# Patient Record
Sex: MALE | Race: WHITE | Employment: OTHER | URBAN - METROPOLITAN AREA
[De-identification: names, ages, dates, MRNs, and addresses within clinical notes are randomized per-mention and may not be internally consistent; named-entity substitution may affect disease eponyms.]

---

## 2017-01-16 ENCOUNTER — TELEPHONE (OUTPATIENT)
Dept: AUDIOLOGY | Facility: CLINIC | Age: 62
End: 2017-01-16

## 2017-01-16 NOTE — TELEPHONE ENCOUNTER
Reason for Call:  Other     Detailed comments: Per Srinivasan she is to collect some hearing aid batteries today from the clinic to please call to advice.    Phone Number Patient can be reached at: Home number on file [phone] (home)    Best Time: anytime    Can we leave a detailed message on this number? YES    Call taken on 1/16/2017 at 9:43 AM by Karely Michael

## 2017-01-18 DIAGNOSIS — H90.3 SENSORINEURAL HEARING LOSS, ASYMMETRICAL: Primary | ICD-10-CM

## 2017-01-18 PROCEDURE — 99207 ZZC NO CHARGE LOS: CPT | Performed by: AUDIOLOGIST

## 2017-01-18 PROCEDURE — V5266 BATTERY FOR HEARING DEVICE: HCPCS | Performed by: AUDIOLOGIST

## 2017-01-18 NOTE — PROGRESS NOTES
Patient calls to request his 3 month supply of batteries be mailed to his home address. Mailed 24 size 13 batteries to the address on file.     CHARGES:    x24 Batteries ($1.25). Total: $30 Bill to patient's insurance.    Tomasz Marin CCC-A  Licensed Audiologist #[id number]  1/18/2017

## 2017-04-13 ENCOUNTER — OFFICE VISIT (OUTPATIENT)
Dept: AUDIOLOGY | Facility: CLINIC | Age: 62
End: 2017-04-13
Payer: COMMERCIAL

## 2017-04-13 DIAGNOSIS — H90.3 SENSORINEURAL HEARING LOSS, ASYMMETRICAL: Primary | ICD-10-CM

## 2017-04-13 PROCEDURE — V5299 HEARING SERVICE: HCPCS | Performed by: AUDIOLOGIST

## 2017-04-13 NOTE — MR AVS SNAPSHOT
"              After Visit Summary   2017    Paulette Dorsey    MRN: [id number]           Patient Information     Date Of Birth          1955        Visit Information        Provider Department      2017 9:30 AM Tomasz Ahmadi [facility]D [facility]        Today's Diagnoses     Sensorineural hearing loss, asymmetrical    -  1       Follow-ups after your visit        Who to contact     If you have questions or need follow up information about today's clinic visit or your schedule please contact [facility] directly at [phone].  Normal or non-critical lab and imaging results will be communicated to you by AdMobi[employer]art, letter or phone within 4 business days after the clinic has received the results. If you do not hear from us within 7 days, please contact the clinic through [employer]Mobiushart or phone. If you have a critical or abnormal lab result, we will notify you by phone as soon as possible.  Submit refill requests through Fairlay or call your pharmacy and they will forward the refill request to us. Please allow 3 business days for your refill to be completed.          Additional Information About Your Visit        MyChart Information     Fairlay lets you send messages to your doctor, view your test results, renew your prescriptions, schedule appointments and more. To sign up, go to [email or web address] . Click on \"Log in\" on the left side of the screen, which will take you to the Welcome page. Then click on \"Sign up Now\" on the right side of the page.     You will be asked to enter the access code listed below, as well as some personal information. Please follow the directions to create your username and password.     Your access code is: [email or web address]  Expires: 2017  9:50 AM     Your access code will  in 90 days. If you need help or a new code, please call your [facility] or [phone].        Care EveryWhere ID     This is your Care EveryWhere ID. This could be " used by other organizations to access your [place] medical records  [id number]         Blood Pressure from Last 3 Encounters:   04/24/14 129/87   01/09/14 125/77   11/28/12 116/65    Weight from Last 3 Encounters:   06/30/16 220 lb (99.8 kg)   04/24/14 224 lb 4.8 oz (101.7 kg)   01/09/14 225 lb 6.4 oz (102.2 kg)              We Performed the Following     HEARING SERVICES, [facility]        Primary Care Provider Office Phone # Fax #    [facility] [phone] [address] [facility] [address]        Thank you!     Thank you for choosing [facility]  for your care. Our goal is always to provide you with excellent care. Hearing back from our patients is one way we can continue to improve our services. Please take a few minutes to complete the written survey that you may receive in the mail after your visit with us. Thank you!             Your Updated Medication List - Protect others around you: Learn how to safely use, store and throw away your medicines at www.disposemymeds.org.          This list is accurate as of: 4/13/17  9:50 AM.  Always use your most recent med list.                   Brand Name Dispense Instructions for use    aspirin 81 MG tablet      1 TABLET DAILY       CYCLOBENZAPRINE HCL PO          fish oil-omega-3 fatty acids 1000 MG capsule      2 capsules daily       OMEPRAZOLE PO      Take  by mouth.       simvastatin 80 MG tablet    ZOCOR     1 TABLET EVERY EVENING

## 2017-04-18 ENCOUNTER — ALLIED HEALTH/NURSE VISIT (OUTPATIENT)
Dept: NURSING | Facility: CLINIC | Age: 62
End: 2017-04-18

## 2017-04-18 DIAGNOSIS — Z53.9 ERRONEOUS ENCOUNTER--DISREGARD: Primary | ICD-10-CM

## 2017-04-18 NOTE — MR AVS SNAPSHOT
"              After Visit Summary   4/18/2017    Paulette Dorsey    MRN: [id number]           Patient Information     Date Of Birth          1955        Visit Information        Provider Department      4/18/2017 1:30 PM [facility]        Today's Diagnoses     ERRONEOUS ENCOUNTER--DISREGARD    -  1       Follow-ups after your visit        Your next 10 appointments already scheduled     Apr 19, 2017  9:00 AM CDT   [facility]    [address] [place] [id number]   [phone]            Apr 25, 2017 10:15 AM CDT   [facility]    [address] [place] [address]   [phone]              Who to contact     If you have questions or need follow up information about today's clinic visit or your schedule please contact [facility] directly at [phone].  Normal or non-critical lab and imaging results will be communicated to you by Pant[employer] Grouphart, letter or phone within 4 business days after the clinic has received the results. If you do not hear from us within 7 days, please contact the clinic through [employer]ou[employer] or phone. If you have a critical or abnormal lab result, we will notify you by phone as soon as possible.  Submit refill requests through Zendrive or call your pharmacy and they will forward the refill request to us. Please allow 3 business days for your refill to be completed.          Additional Information About Your Visit        P[employer]her [employer] Grouphart Information     Zendrive lets you send messages to your doctor, view your test results, renew your prescriptions, schedule appointments and more. To sign up, go to [email or web address] . Click on \"Log in\" on the left side of the screen, which will take you to the Welcome page. Then click on \"Sign up Now\" on the right side of the page.     You " will be asked to enter the access code listed below, as well as some personal information. Please follow the directions to create your username and password.     Your access code is: [email or web address]  Expires: 2017  9:50 AM     Your access code will  in 90 days. If you need help or a new code, please call your [place] clinic or [phone].        Care EveryWhere ID     This is your Care EveryWhere ID. This could be used by other organizations to access your [place] medical records  [id number]         Blood Pressure from Last 3 Encounters:   14 129/87   14 125/77   12 116/65    Weight from Last 3 Encounters:   16 220 lb (99.8 kg)   14 224 lb 4.8 oz (101.7 kg)   14 225 lb 6.4 oz (102.2 kg)              Today, you had the following     No orders found for display       Primary Care Provider Office Phone # Fax #    [facility] [phone] [address] [facility] [address]        Thank you!     Thank you for choosing [facility]  for your care. Our goal is always to provide you with excellent care. Hearing back from our patients is one way we can continue to improve our services. Please take a few minutes to complete the written survey that you may receive in the mail after your visit with us. Thank you!             Your Updated Medication List - Protect others around you: Learn how to safely use, store and throw away your medicines at www.disposemymeds.org.          This list is accurate as of: 17  3:51 PM.  Always use your most recent med list.                   Brand Name Dispense Instructions for use    aspirin 81 MG tablet      1 TABLET DAILY       CYCLOBENZAPRINE HCL PO          fish oil-omega-3 fatty acids 1000 MG capsule      2 capsules daily       OMEPRAZOLE PO      Take  by mouth.       simvastatin 80 MG tablet    ZOCOR     1 TABLET EVERY EVENING

## 2017-04-19 ENCOUNTER — OFFICE VISIT (OUTPATIENT)
Dept: AUDIOLOGY | Facility: CLINIC | Age: 62
End: 2017-04-19
Payer: COMMERCIAL

## 2017-04-19 DIAGNOSIS — H90.3 SENSORINEURAL HEARING LOSS, ASYMMETRICAL: Primary | ICD-10-CM

## 2017-04-19 PROCEDURE — V5299 HEARING SERVICE: HCPCS | Performed by: AUDIOLOGIST

## 2017-04-19 NOTE — MR AVS SNAPSHOT
"              After Visit Summary   4/19/2017    Paulette Dorsey    MRN: [id number]           Patient Information     Date Of Birth          1955        Visit Information        Provider Department      4/19/2017 9:00 AM Tomasz Ahmadi AuD [facility]        Today's Diagnoses     Sensorineural hearing loss, asymmetrical    -  1       Follow-ups after your visit        Your next 10 appointments already scheduled     Apr 25, 2017 10:15 AM CDT   Return Visit with Charlie Lilly MD   [facility]    [address] [place] [address]   [phone]              Who to contact     If you have questions or need follow up information about today's clinic visit or your schedule please contact [facility] directly at [phone].  Normal or non-critical lab and imaging results will be communicated to you by MyChart, letter or phone within 4 business days after the clinic has received the results. If you do not hear from us within 7 days, please contact the clinic through MyChart or phone. If you have a critical or abnormal lab result, we will notify you by phone as soon as possible.  Submit refill requests through [employer] Systems or call your pharmacy and they will forward the refill request to us. Please allow 3 business days for your refill to be completed.          Additional Information About Your Visit        MyChart Information     E[employer] Systems lets you send messages to your doctor, view your test results, renew your prescriptions, schedule appointments and more. To sign up, go to [email or web address] . Click on \"Log in\" on the left side of the screen, which will take you to the Welcome page. Then click on \"Sign up Now\" on the right side of the page.     You will be asked to enter the access code listed below, as well as some personal information. Please follow the directions to create your username and password.     Your access code " is: [email or web address]  Expires: 2017  9:50 AM     Your access code will  in 90 days. If you need help or a new code, please call your [place] clinic or [phone].        Care EveryWhere ID     This is your Care EveryWhere ID. This could be used by other organizations to access your [place] medical records  [id number]         Blood Pressure from Last 3 Encounters:   14 129/87   14 125/77   12 116/65    Weight from Last 3 Encounters:   16 220 lb (99.8 kg)   14 224 lb 4.8 oz (101.7 kg)   14 225 lb 6.4 oz (102.2 kg)              We Performed the Following     HEARING AID CHECK/NO CHARGE        Primary Care Provider Office Phone # Fax #    [facility] [phone] [address] [facility] [address]        Thank you!     Thank you for choosing [facility]  for your care. Our goal is always to provide you with excellent care. Hearing back from our patients is one way we can continue to improve our services. Please take a few minutes to complete the written survey that you may receive in the mail after your visit with us. Thank you!             Your Updated Medication List - Protect others around you: Learn how to safely use, store and throw away your medicines at www.disposemymeds.org.          This list is accurate as of: 17  9:45 AM.  Always use your most recent med list.                   Brand Name Dispense Instructions for use    aspirin 81 MG tablet      1 TABLET DAILY       CYCLOBENZAPRINE HCL PO          fish oil-omega-3 fatty acids 1000 MG capsule      2 capsules daily       OMEPRAZOLE PO      Take  by mouth.       simvastatin 80 MG tablet    ZOCOR     1 TABLET EVERY EVENING

## 2017-04-19 NOTE — PROGRESS NOTES
HEARING AID RECHECK    Patient Name:  Paulette Dorsey    Patient Age:   61 year old    :  1955    Background:   Patient reports the internal feedback (waterfall noise) for the right ear hearing aid has returned and would like to send the hearing aid to the  for out of warranty repair with new 12 month warranty option. Patient was accompanied to today's appointment by his wife.     Procedures:   Programed a [employer] V50-13 (BTE) for patient to use while his right ear Widex BTE hearing aid is in for repair.      Plan:   Patient will be called to schedule a re-pairing appointment once the Widex hearing aid is back from repair.     NO CHARGE VISIT    Tomasz Marin CCC-A  Licensed Audiologist  2017

## 2017-04-25 ENCOUNTER — OFFICE VISIT (OUTPATIENT)
Dept: OTOLARYNGOLOGY | Facility: CLINIC | Age: 62
End: 2017-04-25
Payer: COMMERCIAL

## 2017-04-25 VITALS — BODY MASS INDEX: 30.8 KG/M2 | HEIGHT: 72 IN | RESPIRATION RATE: 16 BRPM | WEIGHT: 227.4 LBS

## 2017-04-25 DIAGNOSIS — H61.23 BILATERAL IMPACTED CERUMEN: Primary | ICD-10-CM

## 2017-04-25 PROCEDURE — 69210 REMOVE IMPACTED EAR WAX UNI: CPT | Performed by: OTOLARYNGOLOGY

## 2017-04-25 PROCEDURE — 99207 ZZC NO CHARGE LOS: CPT | Performed by: OTOLARYNGOLOGY

## 2017-04-25 ASSESSMENT — PAIN SCALES - GENERAL: PAINLEVEL: NO PAIN (0)

## 2017-04-25 NOTE — NURSING NOTE
Chief Complaint   Patient presents with     Cerumen Impaction     Ear Cleaning       Initial Resp 16  Ht 1.829 m (6')  Wt 103.1 kg (227 lb 6.4 oz)  BMI 30.84 kg/m2 Estimated body mass index is 30.84 kg/(m^2) as calculated from the following:    Height as of this encounter: 1.829 m (6').    Weight as of this encounter: 103.1 kg (227 lb 6.4 oz).  Medication Reconciliation: complete     Lilly Lopez MA

## 2017-04-25 NOTE — PROGRESS NOTES
Chief Complaint - ear wax removal    History of Present Illness - Paulette Dorsey is a 61 year old male who presents to me today for ear wax removal in both ears. They have noted symptoms for approximately days.  The patient is concerned about possible ear wax causing a plugged ear and impacting his hearing aid use. They have had their ears cleaned out before most recently by Dr. Roe 6/2016. The patient denies otorrhea, otalgia.   No history of ear surgery or chronic ear disease.     Past Medical History -   Patient Active Problem List   Diagnosis     CARDIOVASCULAR SCREENING; LDL GOAL LESS THAN 100     Choroidal nevus of left eye     SNHL (sensory-neural hearing loss), asymmetrical     Bilateral popliteal artery aneurysm (H)       Current Medications -   Current Outpatient Prescriptions:      CYCLOBENZAPRINE HCL PO, , Disp: , Rfl:      SIMVASTATIN 80 MG OR TABS, 1 TABLET EVERY EVENING, Disp: , Rfl:      OMEPRAZOLE PO, Take  by mouth., Disp: , Rfl:      ASPIRIN 81 MG OR TABS, 1 TABLET DAILY, Disp: , Rfl:      FISH OIL 1000 MG OR CAPS, 2 capsules daily, Disp: , Rfl:     Allergies -   Allergies   Allergen Reactions     Oxycodone      Tylenol/Acetaminophen is OK.  Sweating, pins and needles.     Percocet [Oxycodone-Acetaminophen]        Social History -   Social History     Social History     Marital status: Single     Spouse name: N/A     Number of children: N/A     Years of education: N/A     Social History Main Topics     Smoking status: Former Smoker     Types: Cigars     Quit date: 1/1/2005     Smokeless tobacco: Never Used      Comment: occasional cigar     Alcohol use 0.0 oz/week     0 Standard drinks or equivalent per week      Comment: little bit on weekends     Drug use: No     Sexual activity: Not Asked     Other Topics Concern     Parent/Sibling W/ Cabg, Mi Or Angioplasty Before 65f 55m? No     Social History Narrative       Family History -   Family History   Problem Relation Age of Onset     Breast  Cancer Mother      Respiratory Mother      emphesema     GASTROINTESTINAL DISEASE Mother      diverticulitis     Alcohol/Drug Mother      Arthritis Mother      OSTEOPOROSIS Mother      Gynecology Mother      Hypertension Father      Alcohol/Drug Father      Arthritis Father      Lipids Father      Cardiovascular Father      AAA     CANCER Brother      sarcoidosis     GASTROINTESTINAL DISEASE Brother      diverticulitis     Thyroid Disease Sister      C.A.D. No family hx of      DIABETES No family hx of      CEREBROVASCULAR DISEASE No family hx of      Prostate Cancer No family hx of      Allergies No family hx of      Alzheimer Disease No family hx of      Anesthesia Reaction No family hx of      Obesity No family hx of      Neurologic Disorder No family hx of      Congenital Anomalies No family hx of      Connective Tissue Disorder No family hx of      Blood Disease No family hx of      Asthma Other      M. Aunt (Eneida)     Cancer - colorectal Other      Cousin  from colon ca     Thyroid Disease Other      M. cousin had thyroid issues     Psychotic Disorder Other      M. Cousin bipolar and suicide     Depression Other      M. cousin       Review of Systems - As per HPI and PMHx, otherwise 7 system review of the head and neck negative.    Physical Exam  Resp 16  Ht 1.829 m (6')  Wt 103.1 kg (227 lb 6.4 oz)  BMI 30.84 kg/m2  General - The patient is in no distress.  Alert and oriented to person and place, answers questions and cooperates with examination appropriately.   Voice and Breathing - The patient was breathing comfortably without the use of accessory muscles. There was no wheezing, stridor, or stertor.  The patients voice was clear and strong.  Ears - The auricles are normal in appearance, no erythema. Both ear canals were impacted with cerumen. See below for the procedure. Once the cerumen was removed the tympanic membranes are normal in appearance, bony landmarks are intact.  No retraction,  perforation, or masses.  No fluid or purulence was seen in the external canal or the middle ear.   Eyes - Extraocular movements intact. Sclera were not icteric or injected.  Neurological - Cranial nerves 2 through 12 were grossly intact. House-Brackmann grade 1 out of 6 bilaterally.     Cerumen Removal    Physical Exam and Procedure  Ears - On examination of the ears, I found that both ears were impacted with cerumen.  Therefore, I positioned the patient in the examination chair in a semi-supine position. I used the binocular surgical microscope to perform cerumen removal.  On the right side, I began by using a cerumen loop to gently lift the edges of the cerumen mass away from the walls of the external canal.  Once I did this, I was able to pull away fragments of wax and debris.  I removed all the wax and debri. The tympanic membrane was intact, no sign of perforation or middle ear effusion.    I turned my attention to the left side once again using the binocular surgical microscope to perform cerumen removal.  I began by using a cerumen loop to gently lift the edges of the cerumen mass away from the walls of the external canal.  Once I did this, I was able to pull away fragments of wax and debris. I removed all wax and debri. The tympanic membrane was intact, no sign of perforation or middle ear effusion.      Assessment and Plan - Paulette Dorsey is a 61 year old male who presents to me today with cerumen impaction, and this was removed. We spent the remainder of today's visit on education including not putting any instrument in the ear. The patient can use over-the-counter items such as Debrox or Ceruminex.     The patient will follow up in 9-12 months or as needed.    Charlie Lilly MD  Otolaryngology  [facility]

## 2017-04-25 NOTE — PATIENT INSTRUCTIONS
General Scheduling Information  To schedule your CT/MRI scan, please contact [place] [facility] at [phone]   [address] [place] [address]    To schedule your Surgery, please contact our Specialty Schedulers at [phone]    ENT Clinic Locations Clinic Hours Telephone Number     [facility]  [address] [place] [address]   Tuesday:       8:00am -- 4:00pm    Wednesday:  8:00am - 4:00pm   To schedule an appointment with   Dr. Lilly,   please contact our   Specialty Scheduling Department at:     [phone]       [facility]  [address] [place] [address]   Friday:          8:00am - 4:00pm         Urgent Care Locations Clinic Hours Telephone Numbers     [facility]  [address] [place] [address]     Monday-Friday:     11:00pm - 9:00pm    Saturday-Sunday:  9:00am - 5:00pm   [phone]     [facility]  [address] [place] [address]     Monday-Friday:      5:00pm - 9:00pm     Saturday-Sunday:  9:00am - 5:00pm   [phone]

## 2017-04-25 NOTE — MR AVS SNAPSHOT
After Visit Summary   4/25/2017    Paulette Dorsey    MRN: [id number]           Patient Information     Date Of Birth          1955        Visit Information        Provider Department      4/25/2017 10:15 AM Charlie Lilly MD [facility]        Today's Diagnoses     Bilateral impacted cerumen    -  1      Care Instructions    General Scheduling Information  To schedule your CT/MRI scan, please contact [place] [facility] at [phone] [address] [place] [address]    To schedule your Surgery, please contact our Specialty Schedulers at [phone]    ENT Clinic Locations Clinic Hours Telephone Number     [facility]  [address]  [place] [address]   Tuesday:       8:00am -- 4:00pm    Wednesday:  8:00am - 4:00pm   To schedule an appointment with   Dr. Lilly,   please contact our   Specialty Scheduling Department at:     [phone]       [facility]  [address] [place] [address]   Friday:          8:00am - 4:00pm         Urgent Care Locations Clinic Hours Telephone Numbers     [facility]  [address]  [place] [address]     Monday-Friday:     11:00pm - 9:00pm    Saturday-Sunday:  9:00am - 5:00pm   [phone]     [facility]  [address] [place] [address]     Monday-Friday:      5:00pm - 9:00pm     Saturday-Sunday:  9:00am - 5:00pm   [phone]             Follow-ups after your visit        Who to contact     If you have questions or need follow up information about today's clinic visit or your schedule please contact [facility] directly at [phone].  Normal or non-critical lab and imaging results will be communicated to you by MyChart, letter or phone within 4 business days after the clinic has received the results. If you do not hear from us within 7 days, please contact the clinic through MyChart or phone. If you have a critical or abnormal lab result, we will notify you by  "phone as soon as possible.  Submit refill requests through [employer] or call your pharmacy and they will forward the refill request to us. Please allow 3 business days for your refill to be completed.          Additional Information About Your Visit        VectorLearningharPopulr Information     [employer] lets you send messages to your doctor, view your test results, renew your prescriptions, schedule appointments and more. To sign up, go to [email or web address] . Click on \"Log in\" on the left side of the screen, which will take you to the Welcome page. Then click on \"Sign up Now\" on the right side of the page.     You will be asked to enter the access code listed below, as well as some personal information. Please follow the directions to create your username and password.     Your access code is: [email or web address]  Expires: 2017  9:50 AM     Your access code will  in 90 days. If you need help or a new code, please call your [facility] clinic or [phone].        Care EveryWhere ID     This is your Care EveryWhere ID. This could be used by other organizations to access your [facility] medical records  [id number]        Your Vitals Were     Respirations Height BMI (Body Mass Index)             16 1.829 m (6') 30.84 kg/m2          Blood Pressure from Last 3 Encounters:   14 129/87   14 125/77   12 116/65    Weight from Last 3 Encounters:   17 103.1 kg (227 lb 6.4 oz)   16 99.8 kg (220 lb)   14 101.7 kg (224 lb 4.8 oz)              We Performed the Following     Remove Lisset        Primary Care Provider Office Phone # Fax #    [facility] [phone] [address] [facility] [address]        Thank you!     Thank you for choosing [facility]  for your care. Our goal is always to provide you with excellent care. Hearing back from our patients is one way we can continue to improve our services. Please take a few minutes to " complete the written survey that you may receive in the mail after your visit with us. Thank you!             Your Updated Medication List - Protect others around you: Learn how to safely use, store and throw away your medicines at www.disposemymeds.org.          This list is accurate as of: 4/25/17  1:01 PM.  Always use your most recent med list.                   Brand Name Dispense Instructions for use    aspirin 81 MG tablet      1 TABLET DAILY       CYCLOBENZAPRINE HCL PO          fish oil-omega-3 fatty acids 1000 MG capsule      2 capsules daily       OMEPRAZOLE PO      Take  by mouth.       simvastatin 80 MG tablet    ZOCOR     1 TABLET EVERY EVENING

## 2017-04-26 ENCOUNTER — TELEPHONE (OUTPATIENT)
Dept: AUDIOLOGY | Facility: CLINIC | Age: 62
End: 2017-04-26

## 2017-04-26 NOTE — TELEPHONE ENCOUNTER
Informed the patient that his hearing aid was back from repair and ready to be picked up at the 2nd floor .     -Tomasz GARG

## 2017-05-16 ENCOUNTER — TELEPHONE (OUTPATIENT)
Dept: AUDIOLOGY | Facility: CLINIC | Age: 62
End: 2017-05-16

## 2017-05-16 DIAGNOSIS — H90.3 SENSORINEURAL HEARING LOSS, ASYMMETRICAL: Primary | ICD-10-CM

## 2017-05-16 PROCEDURE — V5266 BATTERY FOR HEARING DEVICE: HCPCS | Performed by: AUDIOLOGIST

## 2017-05-16 NOTE — TELEPHONE ENCOUNTER
I informed Valentine that the requested batteries were ready to be picked up at the 2nd floor .     -Tomasz Marin CCC-A

## 2017-05-16 NOTE — PROGRESS NOTES
Patient's spouse calls to request his 3 month supply of batteries be set at the 2nd floor  for them to . Placed 24 size 13 batteries t the 2nd floor .     CHARGES:    x24 Batteries ($1.25). Total: $30 Bill to patient's insurance.    Tomasz Marin CCC-A  Licensed Audiologist #[id number]  5/16/2017

## 2017-05-16 NOTE — TELEPHONE ENCOUNTER
Reason for Call:  Other call back    Detailed comments: patient would like to request hearing aid batteries for both the right and left hearing aid, the patient's spouse would  Like to  the batteries today at the clinics , please call once ready    Phone Number Patient can be reached at: Home number on file [phone] (home)    Best Time: today    Can we leave a detailed message on this number? YES    Call taken on 5/16/2017 at 8:03 AM by Maria Del Carmen Jones

## 2017-06-15 ENCOUNTER — TELEPHONE (OUTPATIENT)
Dept: AUDIOLOGY | Facility: CLINIC | Age: 62
End: 2017-06-15

## 2017-06-15 NOTE — TELEPHONE ENCOUNTER
I informed them that we could have a loaner set up but it would not be until next Tuesday as I will be in alternat clinics tomorrow and Monday.     -Tomasz JARAA

## 2017-06-15 NOTE — TELEPHONE ENCOUNTER
Reason for Call:  Other     Detailed comments: Per Srinivasan patient's hearing aid is broken and she wants to know if patient can get a loaner when it's brought in for repair.    Phone Number Patient can be reached at: Home number on file [phone] (home)    Best Time: anytime    Can we leave a detailed message on this number? YES    Call taken on 6/15/2017 at 10:46 AM by Karely Michael

## 2017-06-20 ENCOUNTER — TELEPHONE (OUTPATIENT)
Dept: AUDIOLOGY | Facility: CLINIC | Age: 62
End: 2017-06-20

## 2017-06-20 NOTE — TELEPHONE ENCOUNTER
Reason for Call:  Other     Detailed comments: Valentine would to know if patient could  the loaner hearing aid today before 11:30A.  Please call to advise    Phone Number Patient can be reached at: Home number on file [phone] (home)    Best Time: anytime    Can we leave a detailed message on this number? YES    Call taken on 6/20/2017 at 9:38 AM by Karely Michael

## 2017-06-20 NOTE — TELEPHONE ENCOUNTER
I informed Valentine that the loaner hearing aid was ready to be swapped out with the hearing aid of Paulette's that needs to go in for repair.     -Tomasz Marin CCC-A

## 2017-06-27 ENCOUNTER — OFFICE VISIT (OUTPATIENT)
Dept: AUDIOLOGY | Facility: CLINIC | Age: 62
End: 2017-06-27
Payer: COMMERCIAL

## 2017-06-27 DIAGNOSIS — H90.3 SENSORINEURAL HEARING LOSS, ASYMMETRICAL: Primary | ICD-10-CM

## 2017-06-27 PROCEDURE — V5299 HEARING SERVICE: HCPCS | Performed by: AUDIOLOGIST

## 2017-06-27 NOTE — MR AVS SNAPSHOT
"              After Visit Summary   2017    Paulette Dorsey    MRN: [id number]           Patient Information     Date Of Birth          1955        Visit Information        Provider Department      2017 9:45 AM Tomasz Ahmadi [facility]D [facility]        Today's Diagnoses     Sensorineural hearing loss, asymmetrical    -  1       Follow-ups after your visit        Who to contact     If you have questions or need follow up information about today's clinic visit or your schedule please contact [facility] directly at [phone].  Normal or non-critical lab and imaging results will be communicated to you by GestureTekhart, letter or phone within 4 business days after the clinic has received the results. If you do not hear from us within 7 days, please contact the clinic through [employer] or phone. If you have a critical or abnormal lab result, we will notify you by phone as soon as possible.  Submit refill requests through [employer] or call your pharmacy and they will forward the refill request to us. Please allow 3 business days for your refill to be completed.          Additional Information About Your Visit        MyChart Information     [employer] lets you send messages to your doctor, view your test results, renew your prescriptions, schedule appointments and more. To sign up, go to [email or web address] . Click on \"Log in\" on the left side of the screen, which will take you to the Welcome page. Then click on \"Sign up Now\" on the right side of the page.     You will be asked to enter the access code listed below, as well as some personal information. Please follow the directions to create your username and password.     Your access code is: [email or web address]  Expires: 2017  9:50 AM     Your access code will  in 90 days. If you need help or a new code, please call your [facility] or [phone].        Care EveryWhere ID     This is your Care EveryWhere ID. This could be " used by other organizations to access your [facility] medical records  [id number]         Blood Pressure from Last 3 Encounters:   04/24/14 129/87   01/09/14 125/77   11/28/12 116/65    Weight from Last 3 Encounters:   04/25/17 227 lb 6.4 oz (103.1 kg)   06/30/16 220 lb (99.8 kg)   04/24/14 224 lb 4.8 oz (101.7 kg)              We Performed the Following     HEARING AID CHECK/NO CHARGE        Primary Care Provider Office Phone #    [facility] [phone]       No address on file        Equal Access to Services     DIPAK [facility] : Hadii smith Kaur, judie avina, sammie singleton, rudy osborne . So [facility] [phone].    ATENCIÓN: Si habla español, tiene a betancourt disposición servicios gratuitos de asistencia lingüística. Llame al [phone].    We comply with applicable federal civil rights laws and [place] laws. We do not discriminate on the basis of race, color, national origin, age, disability sex, sexual orientation or gender identity.            Thank you!     Thank you for choosing [facility]  for your care. Our goal is always to provide you with excellent care. Hearing back from our patients is one way we can continue to improve our services. Please take a few minutes to complete the written survey that you may receive in the mail after your visit with us. Thank you!             Your Updated Medication List - Protect others around you: Learn how to safely use, store and throw away your medicines at www.disposemymeds.org.          This list is accurate as of: 6/27/17 10:01 AM.  Always use your most recent med list.                   Brand Name Dispense Instructions for use Diagnosis    aspirin 81 MG tablet      1 TABLET DAILY        CYCLOBENZAPRINE HCL PO           fish oil-omega-3 fatty acids 1000 MG capsule      2 capsules daily        OMEPRAZOLE PO      Take  by mouth.        simvastatin 80 MG tablet    ZOCOR     1 TABLET EVERY  EVENING

## 2017-06-27 NOTE — PROGRESS NOTES
HEARING AID RECHECK    Patient Name:  Paulette Dorsey    Patient Age:   61 year old    :  1955    Background:   Paulette was here with his wife to have the loner left ear hearing aid increase din volume.     Procedures:   Today we ran the feedback manager for the left ear and increased the gain in the low frequencies 3 dB for all input levels and increased gain for the mid range and high frequencies 5 dB at all input levels. Patient reports this now sounds more balanced with his right ear hearing aid.      Plan:   Patient will be contacted once his right ear hearing aid is back from repair.     NO CHARGE VISIT    Tomasz Marin CCC-A  Licensed Audiologist  2017

## 2017-06-29 ENCOUNTER — TELEPHONE (OUTPATIENT)
Dept: AUDIOLOGY | Facility: CLINIC | Age: 62
End: 2017-06-29

## 2017-06-29 NOTE — TELEPHONE ENCOUNTER
Patient was contacted to inform him that his hearing aid is back from repair and ready to be picked up.     -Tomasz JARAA

## 2017-07-07 DIAGNOSIS — H90.3 SENSORINEURAL HEARING LOSS, ASYMMETRICAL: Primary | ICD-10-CM

## 2017-07-07 PROCEDURE — V5261 HEARING AID, DIGIT, BIN, BTE: HCPCS | Mod: RB | Performed by: AUDIOLOGIST

## 2017-07-07 NOTE — PROGRESS NOTES
Patient has been contacted to  his out of warranty repair on his Widex BTE hearing aid. Patient will swap out the loaner for the repaired hearing aid.    CHARGES:   .002 Factory repair with 12 month warranty. Bill to insurance.     Tomasz Marin CCC-A  Licensed Audiologist #[id number]  7/7/2017

## 2017-09-15 ENCOUNTER — TELEPHONE (OUTPATIENT)
Dept: AUDIOLOGY | Facility: CLINIC | Age: 62
End: 2017-09-15
Payer: COMMERCIAL

## 2017-09-15 DIAGNOSIS — H90.3 SENSORINEURAL HEARING LOSS, ASYMMETRICAL: Primary | ICD-10-CM

## 2017-09-15 PROCEDURE — V5266 BATTERY FOR HEARING DEVICE: HCPCS | Performed by: AUDIOLOGIST

## 2017-09-15 NOTE — TELEPHONE ENCOUNTER
Reason for Call:  Other call back    Detailed comments:  Srinivasan calling. He needs hearing aid batteries. Please call when ready.  Also, a cleaning tool.    Phone Number Patient can be reached at:  Call the number above.           Best Time:  Any     Can we leave a detailed message on this number? YES    Call taken on 9/15/2017 at 11:38 AM by Eugenia Yee

## 2018-01-04 ENCOUNTER — TELEPHONE (OUTPATIENT)
Dept: AUDIOLOGY | Facility: CLINIC | Age: 63
End: 2018-01-04
Payer: COMMERCIAL

## 2018-01-04 DIAGNOSIS — H90.3 SENSORINEURAL HEARING LOSS, ASYMMETRICAL: Primary | ICD-10-CM

## 2018-01-04 PROCEDURE — V5266 BATTERY FOR HEARING DEVICE: HCPCS | Performed by: AUDIOLOGIST

## 2018-01-04 NOTE — TELEPHONE ENCOUNTER
Reason for Call:  Other call back    Detailed comments:  Patient calling. He needs hearing aid batteries. Please call and let know when ready to .     Phone Number Patient can be reached at: Home number on file [phone] (home)    Best Time:  Any     Can we leave a detailed message on this number? YES    Call taken on 1/4/2018 at 8:04 AM by Eugenia Yee

## 2018-01-04 NOTE — TELEPHONE ENCOUNTER
Patient calls to request his 3 month supply of batteries be placed at the 2nd floor  for . Placed 24 size 13 batteries at the .     CHARGES:   .002 x24 Batteries ($1). Total: $24 Bill to patient's insurance.    Tomasz Marin CCC-A  Licensed Audiologist #[id number]  1/4/2018

## 2018-01-23 ENCOUNTER — OFFICE VISIT (OUTPATIENT)
Dept: AUDIOLOGY | Facility: CLINIC | Age: 63
End: 2018-01-23
Payer: COMMERCIAL

## 2018-01-23 DIAGNOSIS — H90.3 SENSORINEURAL HEARING LOSS, ASYMMETRICAL: Primary | ICD-10-CM

## 2018-01-23 PROCEDURE — V5299 HEARING SERVICE: HCPCS | Performed by: AUDIOLOGIST

## 2018-01-23 PROCEDURE — 92591 HC HEARING AID EXAM BINAURAL: CPT | Performed by: AUDIOLOGIST

## 2018-01-23 NOTE — MR AVS SNAPSHOT
After Visit Summary   1/23/2018    Paulette Dorsey    MRN: [id number]           Patient Information     Date Of Birth          1955        Visit Information        Provider Department      1/23/2018 2:00 PM Tomasz Ahmadi AuD [facility]        Today's Diagnoses     Sensorineural hearing loss, asymmetrical    -  1       Follow-ups after your visit        Your next 10 appointments already scheduled     Jan 24, 2018 10:30 AM CST   New Visit with Charlie Lilly MD   [facility]    [address] [place] [id number]   [phone]            Jan 24, 2018 11:00 AM CST   New Visit with Gary Bain   [facility]    [address] [place] [address]   [phone]            Jan 30, 2018  2:00 PM CST   New Visit with Osmin Alicia MD   [facility]    [address] [place] [address]   [phone]              Who to contact     If you have questions or need follow up information about today's clinic visit or your schedule please contact [facility] directly at [phone].  Normal or non-critical lab and imaging results will be communicated to you by MyChart, letter or phone within 4 business days after the clinic has received the results. If you do not hear from us within 7 days, please contact the clinic through MyChart or phone. If you have a critical or abnormal lab result, we will notify you by phone as soon as possible.  Submit refill requests through [employer] or call your pharmacy and they will forward the refill request to us. Please allow 3 business days for your refill to be completed.          Additional Information About Your Visit        [employer]igen Information     [employer] lets you send messages to your doctor, view your test results, renew your prescriptions, schedule appointments and more.  "To sign up, go to [email or web address] . Click on \"Log in\" on the left side of the screen, which will take you to the Welcome page. Then click on \"Sign up Now\" on the right side of the page.     You will be asked to enter the access code listed below, as well as some personal information. Please follow the directions to create your username and password.     Your access code is: [email or web address]  Expires: 2018  3:00 PM     Your access code will  in 90 days. If you need help or a new code, please call your [facility] clinic or [phone].        Care EveryWhere ID     This is your Care EveryWhere ID. This could be used by other organizations to access your [facility] medical records  [id number]         Blood Pressure from Last 3 Encounters:   14 129/87   14 125/77   12 116/65    Weight from Last 3 Encounters:   17 227 lb 6.4 oz (103.1 kg)   16 220 lb (99.8 kg)   14 224 lb 4.8 oz (101.7 kg)              We Performed the Following     HEARING AID EXAM BINAURAL     HEARING SERVICES, [facility]        Primary Care Provider Office Phone # Fax #    [facility] [phone] [address] [facility] [address]        Equal Access to Services     DIEGO CASILLAS : Hadii aad ku hadasho Soomaali, waaxda luqadaha, qaybta kaalmada adeegyada, waxay idiin hayaan debra osborne . So [facility] [phone].    ATENCIÓN: Si habla español, tiene a betancourt disposición servicios gratuitos de asistencia lingüística. Llame al [phone].    We comply with applicable federal civil rights laws and [place] laws. We do not discriminate on the basis of race, color, national origin, age, disability, sex, sexual orientation, or gender identity.            Thank you!     Thank you for choosing [facility]  for your care. Our goal is always to provide you with excellent care. Hearing back from our patients is one way we can continue to improve our services. " Please take a few minutes to complete the written survey that you may receive in the mail after your visit with us. Thank you!             Your Updated Medication List - Protect others around you: Learn how to safely use, store and throw away your medicines at www.disposemymeds.org.          This list is accurate as of: 1/23/18  3:00 PM.  Always use your most recent med list.                   Brand Name Dispense Instructions for use Diagnosis    aspirin 81 MG tablet      1 TABLET DAILY        CYCLOBENZAPRINE HCL PO           fish oil-omega-3 fatty acids 1000 MG capsule      2 capsules daily        OMEPRAZOLE PO      Take  by mouth.        simvastatin 80 MG tablet    ZOCOR     1 TABLET EVERY EVENING

## 2018-01-23 NOTE — PROGRESS NOTES
AUDIOLOGY REPORT    SUBJECTIVE: Paulette Dorsey is a 62 year old male was seen in the Audiology Clinic at  [facility] on 1/23/18 to discuss concerns with hearing and functional communication difficulties. The patient was accompanied by his wife. Paulette has been seen previously on 2/5/15, and results revealed an asymmetric sensorineural hearing loss. The patient was medically evaluated an determined to be cleared for binaural hearing aids by Dr. Hummel. Paulette notes difficulty with communication in a variety of listening situations and would like to explore the possibility of better hearing through more current technology.    OBJECTIVE:  Patient is a hearing aid candidate. Patient would like to move forward with a hearing aid evaluation today. Therefore, the patient was presented with different options for amplification to help aid in communication. Discussed styles, levels of technology and monaural vs. binaural fitting.     The hearing aid(s) mutually chosen were:  Binaural: Resound Linx2 7 ALLYSSA Model   COLOR: #16 (Gloss Anthracite)  BATTERY SIZE: 13  EARMOLD/TIPS: micromold with canal lock  CANAL/ LENGTH: 2 M (right) and 2 P (left)    Otoscopy revealed partial obstruction with cerumen bilaterally. Patient will be seen  By ENT tomorrow for cerumen removal and to receive updated medical clearance for hearing aid use.  At that time a hearing test will be conducted, impressions taken, and hearing aids will be ordered.  In addition, his current earmold tubes were changed bilaterally.    ASSESSMENT:   Asymmetric sensorineural hearing loss.    Reviewed purchase information and warranty information with patient. The 45 day trial period was explained to patient. The patient was given a copy of the [place] Department of Health consumer brochure on purchasing hearing instruments. Patient risk factors have been provided to the patient in writing prior to the sale of the hearing aid per FDA  regulation. The risk factors are also available in the User Instructional Booklet to be presented on the day of the hearing aid fitting. Hearing aid(s) ordered. Hearing aid evaluation completed.       PLAN: Paulette will be scheduled to return in 2-3 weeks for a hearing aid fitting and programming. Purchase agreement will be completed on that date. Please contact this clinic with any questions or concerns.    Wood Rodriguez MA, CCC-A  Licensed Audiologist #[id number]    Tomasz [facility]  Licensed Audiologist #[id number]  1/23/2018

## 2018-01-24 ENCOUNTER — OFFICE VISIT (OUTPATIENT)
Dept: AUDIOLOGY | Facility: CLINIC | Age: 63
End: 2018-01-24
Payer: COMMERCIAL

## 2018-01-24 ENCOUNTER — OFFICE VISIT (OUTPATIENT)
Dept: OTOLARYNGOLOGY | Facility: CLINIC | Age: 63
End: 2018-01-24
Payer: COMMERCIAL

## 2018-01-24 VITALS — BODY MASS INDEX: 30.94 KG/M2 | HEIGHT: 72 IN | WEIGHT: 228.4 LBS | TEMPERATURE: 97.5 F

## 2018-01-24 DIAGNOSIS — H61.23 BILATERAL IMPACTED CERUMEN: Primary | ICD-10-CM

## 2018-01-24 DIAGNOSIS — H90.3 SENSORINEURAL HEARING LOSS, ASYMMETRICAL: Primary | ICD-10-CM

## 2018-01-24 PROCEDURE — 92567 TYMPANOMETRY: CPT | Performed by: AUDIOLOGIST

## 2018-01-24 PROCEDURE — 99207 ZZC NO CHARGE LOS: CPT | Performed by: OTOLARYNGOLOGY

## 2018-01-24 PROCEDURE — 69210 REMOVE IMPACTED EAR WAX UNI: CPT | Performed by: OTOLARYNGOLOGY

## 2018-01-24 PROCEDURE — 92557 COMPREHENSIVE HEARING TEST: CPT | Performed by: AUDIOLOGIST

## 2018-01-24 PROCEDURE — V5275 EAR IMPRESSION: HCPCS | Performed by: AUDIOLOGIST

## 2018-01-24 NOTE — PROGRESS NOTES
AUDIOLOGY REPORT    SUBJECTIVE:  Paulette Dorsey is a 62 year old male who was seen in the Audiology Clinic at the [facility] following cerumen removal and referral by Dr. Lilly.  The patient has been seen previously in this clinic on 2/5/15 for assessment and results indicated bilateral asymmetric sensorineural hearing loss. The patient reports a suspected decline in hearing sensitivity and is not hearing as well from his hearing aids as he once did. The patient denies any new otologic symptoms.  The patient notes difficulty with communication in a variety of listening situations.  He was accompanied today by his wife.    OBJECTIVE:    Otoscopic exam indicates ears are clear of cerumen bilaterally     Pure Tone Thresholds assessed using conventional audiometry with good  reliability from 250-8000 Hz bilaterally using circumaural headphones     RIGHT:  mild and moderate-severe sensorineural hearing loss    LEFT:    mild and severe sensorineural hearing loss    Tympanogram:    RIGHT: normal eardrum mobility    LEFT:   normal eardrum mobility  Speech Reception Threshold:    RIGHT: 35 dB HL    LEFT:   45 dB HL  Word Recognition Score:     RIGHT: 88% at 75 dB HL using NU-6 recorded word list.    LEFT:   84% at 85 dB HL using NU-6 recorded word list.    Following the hearing evaluation, bilateral ear impressions were taken without incident.    ASSESSMENT:   Asymmetric sensorineural hearing loss.  Compared to patient's previous audiogram dated 2/5/15, hearing has declined slightly. Today s results were discussed with the patient in detail.     PLAN:  Patient is a good candidate for amplification at this time.  New Resound hearing aids will be ordered and fit following medical clearance from Dr. Lilly.   It is recommended that the patient set up anappointment for a hearing aid fitting in 2-3 weeks with Dr. Ahmadi.  Please call this clinic with questions regarding these results or recommendations.        Wood Rodriguez,  MA, [facility]  Licensed Audiologist, #[id number]    Gary Bain, CCC-A  Licensed Audiologist, #[id number]

## 2018-01-24 NOTE — MR AVS SNAPSHOT
"              After Visit Summary   1/24/2018    Paulette Dorsey    MRN: [id number]           Patient Information     Date Of Birth          1955        Visit Information        Provider Department      1/24/2018 11:00 AM Bee Scruggs AuD [facility]        Today's Diagnoses     Sensorineural hearing loss, asymmetrical    -  1       Follow-ups after your visit        Your next 10 appointments already scheduled     Jan 30, 2018  2:00 PM CST   New Visit with Osmin Alicia MD   [facility] ([facility])    [address] [address] [phone]            Feb 20, 2018  2:30 PM CST   Hearing Aid Fitting with Gary Tamez   [facility] ([facility])    [address] [id number] [phone]              Who to contact     If you have questions or need follow up information about today's clinic visit or your schedule please contact [facility] directly at [phone].  Normal or non-critical lab and imaging results will be communicated to you by Interactive Performance Solutionshart, letter or phone within 4 business days after the clinic has received the results. If you do not hear from us within 7 days, please contact the clinic through [employer]hart or phone. If you have a critical or abnormal lab result, we will notify you by phone as soon as possible.  Submit refill requests through Boostable or call your pharmacy and they will forward the refill request to us. Please allow 3 business days for your refill to be completed.          Additional Information About Your Visit        Boostable Information     Boostable lets you send messages to your doctor, view your test results, renew your prescriptions, schedule appointments and more. To sign up, go to [email or web address] . Click on \"Log in\" on the left side of the screen, which will take you to the Welcome page. Then click on \"Sign up Now\" on the right side of " the page.     You will be asked to enter the access code listed below, as well as some personal information. Please follow the directions to create your username and password.     Your access code is: [email or web address]  Expires: 2018  3:00 PM     Your access code will  in 90 days. If you need help or a new code, please call your [place] clinic or [phone].        Care EveryWhere ID     This is your Care EveryWhere ID. This could be used by other organizations to access your [place] medical records  [id number]         Blood Pressure from Last 3 Encounters:   14 129/87   14 125/77   12 116/65    Weight from Last 3 Encounters:   18 228 lb 6.4 oz (103.6 kg)   17 227 lb 6.4 oz (103.1 kg)   16 220 lb (99.8 kg)              We Performed the Following     AUDIOGRAM/TYMPANOGRAM - INTERFACE     COMPREHENSIVE HEARING TEST     EAR IMPRESSION, EACH     TYMPANOMETRY        Primary Care Provider Office Phone # Fax #    [facility] [phone]       [address] [address]        Equal Access to Services     DIEGO CASILLAS AH: Hadii aad ku hadasho Soomaali, waaxda luqadaha, qaybta kaalmada adeegyada, rudy tomlinsonn debra carrillo. So [facility] [phone].    ATENCIÓN: Si habla español, tiene a betancourt disposición servicios gratuitos de asistencia lingüística. Llame al [phone].    We comply with applicable federal civil rights laws and [place] laws. We do not discriminate on the basis of race, color, national origin, age, disability, sex, sexual orientation, or gender identity.            Thank you!     Thank you for choosing [facility]  for your care. Our goal is always to provide you with excellent care. Hearing back from our patients is one way we can continue to improve our services. Please take a few minutes to complete the written survey that you may receive in the mail after your visit with us. Thank you!              Your Updated Medication List - Protect others around you: Learn how to safely use, store and throw away your medicines at www.disposemymeds.org.          This list is accurate as of 1/24/18 11:47 AM.  Always use your most recent med list.                   Brand Name Dispense Instructions for use Diagnosis    aspirin 81 MG tablet      1 TABLET DAILY        CYCLOBENZAPRINE HCL PO           fish oil-omega-3 fatty acids 1000 MG capsule      2 capsules daily        OMEPRAZOLE PO      Take  by mouth.        simvastatin 80 MG tablet    ZOCOR     1 TABLET EVERY EVENING        VITAMIN D (CHOLECALCIFEROL) PO      Take by mouth daily

## 2018-01-24 NOTE — PROGRESS NOTES
Cerumen Removal - audiology noted bilateral cerumen in this patient with hearing aids.    Physical Exam and Procedure  Ears - On examination of the ears, I found that both ears were impacted with cerumen.  Therefore, I positioned the patient in the examination chair in a semi-supine position. I used the binocular surgical microscope to perform cerumen removal.  On the right side, I began by using a cerumen loop to gently lift the edges of the cerumen mass away from the walls of the external canal.  Once I did this, I was able to pull away fragments of wax and debris. I removed all the wax and debri.  The tympanic membrane was intact, no sign of perforation or middle ear effusion.    I turned my attention to the left side once again using the binocular surgical microscope to perform cerumen removal.  I began by using a cerumen loop to gently lift the edges of the cerumen mass away from the walls of the external canal.  Once I did this, I was able to  pull away fragments of wax and debris. I removed all the wax and debri. The tympanic membrane was intact, no sign of perforation or middle ear effusion.    A/P - bilateral cerumen impaction. Ears cleaned. F/u with audiology to discuss hearing aids.

## 2018-01-24 NOTE — MR AVS SNAPSHOT
"              After Visit Summary   1/24/2018    Paulette Dorsey    MRN: [id number]           Patient Information     Date Of Birth          1955        Visit Information        Provider Department      1/24/2018 10:30 AM Charlie Lilly MD [facility]        Today's Diagnoses     Bilateral impacted cerumen    -  1       Follow-ups after your visit        Your next 10 appointments already scheduled     Jan 30, 2018  2:00 PM CST   New Visit with Osmin Alicia MD   [facility] ([facility]    [address] [address] [phone]              Who to contact     If you have questions or need follow up information about today's clinic visit or your schedule please contact [facility] directly at [phone].  Normal or non-critical lab and imaging results will be communicated to you by MyChart, letter or phone within 4 business days after the clinic has received the results. If you do not hear from us within 7 days, please contact the clinic through MyChart or phone. If you have a critical or abnormal lab result, we will notify you by phone as soon as possible.  Submit refill requests through Tendril or call your pharmacy and they will forward the refill request to us. Please allow 3 business days for your refill to be completed.          Additional Information About Your Visit        MyChart Information     Tendril lets you send messages to your doctor, view your test results, renew your prescriptions, schedule appointments and more. To sign up, go to [email or web address] . Click on \"Log in\" on the left side of the screen, which will take you to the Welcome page. Then click on \"Sign up Now\" on the right side of the page.     You will be asked to enter the access code listed below, as well as some personal information. Please follow the directions to create your username and password.     Your access code is: [email or web address]  Expires: " 2018  3:00 PM     Your access code will  in 90 days. If you need help or a new code, please call your [facility] or [phone].        Care EveryWhere ID     This is your Care EveryWhere ID. This could be used by other organizations to access your [place] medical records  [id number]        Your Vitals Were     Temperature Height BMI (Body Mass Index)             97.5  F (36.4  C) (Tympanic) 1.829 m (6') 30.98 kg/m2          Blood Pressure from Last 3 Encounters:   14 129/87   14 125/77   12 116/65    Weight from Last 3 Encounters:   18 103.6 kg (228 lb 6.4 oz)   17 103.1 kg (227 lb 6.4 oz)   16 99.8 kg (220 lb)              We Performed the Following     Remove Lisset        Primary Care Provider Office Phone # Fax #    [facility] [phone] [address] [address]        Equal Access to Services     [facility] : Hadii smith corrales hadyvette Soaye, waaxda luqadaha, qaybta kaalmada areli, rudy osborne . So [facility] [phone].    ATENCIÓN: Si habla español, tiene a betancourt disposición servicios gratuitos de asistencia lingüística. [facility] [phone].    We comply with applicable federal civil rights laws and [place] laws. We do not discriminate on the basis of race, color, national origin, age, disability, sex, sexual orientation, or gender identity.            Thank you!     Thank you for choosing [facility]  for your care. Our goal is always to provide you with excellent care. Hearing back from our patients is one way we can continue to improve our services. Please take a few minutes to complete the written survey that you may receive in the mail after your visit with us. Thank you!             Your Updated Medication List - Protect others around you: Learn how to safely use, store and throw away your medicines at www.disposemymeds.org.          This list is accurate  as of 1/24/18 11:09 AM.  Always use your most recent med list.                   Brand Name Dispense Instructions for use Diagnosis    aspirin 81 MG tablet      1 TABLET DAILY        CYCLOBENZAPRINE HCL PO           fish oil-omega-3 fatty acids 1000 MG capsule      2 capsules daily        OMEPRAZOLE PO      Take  by mouth.        simvastatin 80 MG tablet    ZOCOR     1 TABLET EVERY EVENING        VITAMIN D (CHOLECALCIFEROL) PO      Take by mouth daily

## 2018-01-24 NOTE — LETTER
1/24/2018         RE: Paulette Dorsey  [address]   [place] [facility] [address]        Dear Colleague,    Thank you for referring your patient, Paulette Dorsey, to the [facility]. Please see a copy of my visit note below.    Cerumen Removal - audiology noted bilateral cerumen in this patient with hearing aids.    Physical Exam and Procedure  Ears - On examination of the ears, I found that both ears were impacted with cerumen.  Therefore, I positioned the patient in the examination chair in a semi-supine position. I used the binocular surgical microscope to perform cerumen removal.  On the right side, I began by using a cerumen loop to gently lift the edges of the cerumen mass away from the walls of the external canal.  Once I did this, I was able to pull away fragments of wax and debris. I removed all the wax and debri.  The tympanic membrane was intact, no sign of perforation or middle ear effusion.    I turned my attention to the left side once again using the binocular surgical microscope to perform cerumen removal.  I began by using a cerumen loop to gently lift the edges of the cerumen mass away from the walls of the external canal.  Once I did this, I was able to  pull away fragments of wax and debris. I removed all the wax and debri. The tympanic membrane was intact, no sign of perforation or middle ear effusion.    A/P - bilateral cerumen impaction. Ears cleaned. F/u with audiology to discuss hearing aids.         Again, thank you for allowing me to participate in the care of your patient.        Sincerely,        Charlie Lilly MD

## 2018-02-19 ENCOUNTER — OFFICE VISIT (OUTPATIENT)
Dept: OPHTHALMOLOGY | Facility: CLINIC | Age: 63
End: 2018-02-19
Payer: COMMERCIAL

## 2018-02-19 ENCOUNTER — APPOINTMENT (OUTPATIENT)
Dept: OPTOMETRY | Facility: CLINIC | Age: 63
End: 2018-02-19
Payer: COMMERCIAL

## 2018-02-19 DIAGNOSIS — Z01.01 ENCOUNTER FOR EXAMINATION OF EYES AND VISION WITH ABNORMAL FINDINGS: Primary | ICD-10-CM

## 2018-02-19 DIAGNOSIS — H52.4 PRESBYOPIA: ICD-10-CM

## 2018-02-19 DIAGNOSIS — D31.32 CHOROIDAL NEVUS OF LEFT EYE: ICD-10-CM

## 2018-02-19 PROCEDURE — 92342 FIT SPECTACLES MULTIFOCAL: CPT | Performed by: OPHTHALMOLOGY

## 2018-02-19 PROCEDURE — 92014 COMPRE OPH EXAM EST PT 1/>: CPT | Performed by: OPHTHALMOLOGY

## 2018-02-19 PROCEDURE — 92015 DETERMINE REFRACTIVE STATE: CPT | Performed by: OPHTHALMOLOGY

## 2018-02-19 ASSESSMENT — REFRACTION_MANIFEST
OD_ADD: +2.50
OD_AXIS: 090
OS_ADD: +2.50
OS_CYLINDER: SPHERE
OD_SPHERE: -3.00
OS_SPHERE: -2.75
OD_CYLINDER: +0.25

## 2018-02-19 ASSESSMENT — REFRACTION_WEARINGRX
OD_CYLINDER: +1.00
OD_ADD: +2.50
SPECS_TYPE: PAL
OS_ADD: +2.50
OD_SPHERE: -4.25
OS_CYLINDER: +0.50
OS_AXIS: 064
OD_AXIS: 090
OS_SPHERE: -3.50

## 2018-02-19 ASSESSMENT — VISUAL ACUITY
CORRECTION_TYPE: GLASSES
OD_CC: 20/30
METHOD: SNELLEN - LINEAR
OD_CC+: -2
OS_CC: 20/25

## 2018-02-19 ASSESSMENT — SLIT LAMP EXAM - LIDS
COMMENTS: 1+ DERMATOCHALASIS - UPPER LID
COMMENTS: 1+ DERMATOCHALASIS - UPPER LID

## 2018-02-19 ASSESSMENT — EXTERNAL EXAM - RIGHT EYE: OD_EXAM: NORMAL

## 2018-02-19 ASSESSMENT — TONOMETRY
IOP_METHOD: APPLANATION
OD_IOP_MMHG: 20
OS_IOP_MMHG: 19

## 2018-02-19 ASSESSMENT — CONF VISUAL FIELD
METHOD: COUNTING FINGERS
OS_NORMAL: 1
OD_NORMAL: 1

## 2018-02-19 ASSESSMENT — CUP TO DISC RATIO
OS_RATIO: 0.3
OD_RATIO: 0.3

## 2018-02-19 ASSESSMENT — EXTERNAL EXAM - LEFT EYE: OS_EXAM: NORMAL

## 2018-02-19 NOTE — PROGRESS NOTES
Current Eye Medications:  no     Subjective: Complete eye exam. Vision is little blurred in distance for last 2 years. Starts seeing double in left eye when on computer for about 1 hour. Patient having to tip head back and look through progressive to make things clear in distance with old glasses. Never was able to see out of new glasses that had lined bifocal. Vertigo for last 1.5 years, was seen in ER last Thursday for it. Zero pain, or discomfort both eyes.     Objective:  See Ophthalmology Exam.       Assessment:  Stable eye exam.      ICD-10-CM    1. Encounter for examination of eyes and vision with abnormal findings Z01.01 REFRACTIVE STATUS   2. Presbyopia H52.4 REFRACTIVE STATUS   3. 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   Call in October 2019 for an appointment in February 2020 for Complete Exam    Dr. Alicia [phone]

## 2018-02-19 NOTE — LETTER
2/19/2018         RE: Paulette Dorsey  [address]   [place] [facility] [place] [facility] [address]        Dear Colleague,    Thank you for referring your patient, Paulette Doresy, to the [facility]. Please see a copy of my visit note below.     Current Eye Medications:  no     Subjective: Complete eye exam. Vision is little blurred in distance for last 2 years. Starts seeing double in left eye when on computer for about 1 hour. Patient having to tip head back and look through progressive to make things clear in distance with old glasses. Never was able to see out of new glasses that had lined bifocal. Vertigo for last 1.5 years, was seen in ER last Thursday for it. Zero pain, or discomfort both eyes.     Objective:  See Ophthalmology Exam.       Assessment:  Stable eye exam.      ICD-10-CM    1. Encounter for examination of eyes and vision with abnormal findings Z01.01 REFRACTIVE STATUS   2. Presbyopia H52.4 REFRACTIVE STATUS   3. 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   Call in October 2019 for an appointment in February 2020 for Complete Exam    Dr. Alicia [phone]           Again, thank you for allowing me to participate in the care of your patient.        Sincerely,        Osmin Alicia MD

## 2018-02-19 NOTE — PATIENT INSTRUCTIONS
Glasses Rx given - optional   Possible posterior vitreous detachment (sudden onset large floater and/or flashing lights) discussed.   Call in October 2019 for an appointment in February 2020 for Complete Exam    Dr. Alicia [phone]

## 2018-02-19 NOTE — MR AVS SNAPSHOT
After Visit Summary   2/19/2018    Paulette Dorsey    MRN: [id number]           Patient Information     Date Of Birth          1955        Visit Information        Provider Department      2/19/2018 10:00 AM Osmin Alicia MD [facility]        Today's Diagnoses     Choroidal nevus of left eye    -  1    Encounter for examination of eyes and vision with abnormal findings        Presbyopia          Care Instructions    Glasses Rx given - optional   Possible posterior vitreous detachment (sudden onset large floater and/or flashing lights) discussed.   Call in October 2018 for an appointment in February 2019 for Complete Exam    Dr. Alicia [phone]          Follow-ups after your visit        Your next 10 appointments already scheduled     Feb 20, 2018  2:30 PM CST   Hearing Aid Fitting with Gary Tamez   [facility] ([facility])    [address] [place] [address] [phone]              Who to contact     If you have questions or need follow up information about today's clinic visit or your schedule please contact [facility] directly at [phone].  Normal or non-critical lab and imaging results will be communicated to you by MyChart, letter or phone within 4 business days after the clinic has received the results. If you do not hear from us within 7 days, please contact the clinic through F[employer]Author[employer]hart or phone. If you have a critical or abnormal lab result, we will notify you by phone as soon as possible.  Submit refill requests through [email or web address] or call your pharmacy and they will forward the refill request to us. Please allow 3 business days for your refill to be completed.          Additional Information About Your Visit        MyChart Information     G[employer] lets you send messages to your doctor, view your test results, renew your prescriptions, schedule appointments and more. To sign up, go to  [email or web address] . Click on \"Log in\" on the left side of the screen, which will take you to the Welcome page. Then click on \"Sign up Now\" on the right side of the page.     You will be asked to enter the access code listed below, as well as some personal information. Please follow the directions to create your username and password.     Your access code is: [email or web address]  Expires: 2018  3:00 PM     Your access code will  in 90 days. If you need help or a new code, please call your [place] clinic or [phone].        Care EveryWhere ID     This is your Care EveryWhere ID. This could be used by other organizations to access your [place] medical records  [id number]         Blood Pressure from Last 3 Encounters:   14 129/87   14 125/77   12 116/65    Weight from Last 3 Encounters:   18 103.6 kg (228 lb 6.4 oz)   17 103.1 kg (227 lb 6.4 oz)   16 99.8 kg (220 lb)              Today, you had the following     No orders found for display       Primary Care Provider Office Phone # Fax #    [facility] [phone] [address] [address]        Equal Access to Services     DIEGO CASILLAS AH: Hadii smith roseo Soaye, waaxda luqadaha, qaybta kaalmada areli, rudy osborne . So [facility] [place] [facility] [phone].    ATENCIÓN: Si habla español, tiene a betancourt disposición servicios gratuitos de asistencia lingüística. Alexi al [phone].    We comply with applicable federal civil rights laws and [place] laws. We do not discriminate on the basis of race, color, national origin, age, disability, sex, sexual orientation, or gender identity.            Thank you!     Thank you for choosing [facility]  for your care. Our goal is always to provide you with excellent care. Hearing back from our patients is one way we can continue to improve our services. Please take a few minutes to complete the " written survey that you may receive in the mail after your visit with us. Thank you!             Your Updated Medication List - Protect others around you: Learn how to safely use, store and throw away your medicines at www.disposemymeds.org.          This list is accurate as of 2/19/18 10:45 AM.  Always use your most recent med list.                   Brand Name Dispense Instructions for use Diagnosis    aspirin 81 MG tablet      1 TABLET DAILY        CYCLOBENZAPRINE HCL PO           fish oil-omega-3 fatty acids 1000 MG capsule      2 capsules daily        OMEPRAZOLE PO      Take  by mouth.        simvastatin 80 MG tablet    ZOCOR     1 TABLET EVERY EVENING        VITAMIN D (CHOLECALCIFEROL) PO      Take by mouth daily

## 2018-02-20 ENCOUNTER — OFFICE VISIT (OUTPATIENT)
Dept: AUDIOLOGY | Facility: CLINIC | Age: 63
End: 2018-02-20
Payer: COMMERCIAL

## 2018-02-20 DIAGNOSIS — H90.3 SENSORINEURAL HEARING LOSS, ASYMMETRICAL: Primary | ICD-10-CM

## 2018-02-20 PROCEDURE — V5020 CONFORMITY EVALUATION: HCPCS | Performed by: AUDIOLOGIST

## 2018-02-20 PROCEDURE — V5160 DISPENSING FEE BINAURAL: HCPCS | Performed by: AUDIOLOGIST

## 2018-02-20 PROCEDURE — V5264 EAR MOLD/INSERT: HCPCS | Performed by: AUDIOLOGIST

## 2018-02-20 PROCEDURE — 92593 HC HEARING AID CHECK, BINAURAL: CPT | Performed by: AUDIOLOGIST

## 2018-02-20 PROCEDURE — V5261 HEARING AID, DIGIT, BIN, BTE: HCPCS | Performed by: AUDIOLOGIST

## 2018-02-20 PROCEDURE — V5011 HEARING AID FITTING/CHECKING: HCPCS | Performed by: AUDIOLOGIST

## 2018-02-20 NOTE — MR AVS SNAPSHOT
"              After Visit Summary   2/20/2018    Paulette Dorsey    MRN: [id number]           Patient Information     Date Of Birth          1955        Visit Information        Provider Department      2/20/2018 2:30 PM Tomasz Ahmadi AuD [facility]        Today's Diagnoses     Sensorineural hearing loss, asymmetrical    -  1       Follow-ups after your visit        Your next 10 appointments already scheduled     Mar 09, 2018 11:00 AM CST   Hearing Aid Fitting with Gary Tamez   [facility] ([facility])    [address] [place] [id number]   [phone]              Who to contact     If you have questions or need follow up information about today's clinic visit or your schedule please contact [facility] directly at [phone].  Normal or non-critical lab and imaging results will be communicated to you by MyChart, letter or phone within 4 business days after the clinic has received the results. If you do not hear from us within 7 days, please contact the clinic through MyChart or phone. If you have a critical or abnormal lab result, we will notify you by phone as soon as possible.  Submit refill requests through [employer] or call your pharmacy and they will forward the refill request to us. Please allow 3 business days for your refill to be completed.          Additional Information About Your Visit        MyChart Information     Doorman lets you send messages to your doctor, view your test results, renew your prescriptions, schedule appointments and more. To sign up, go to [email or web address] . Click on \"Log in\" on the left side of the screen, which will take you to the Welcome page. Then click on \"Sign up Now\" on the right side of the page.     You will be asked to enter the access code listed below, as well as some personal information. Please follow the directions to create your username and password.     Your " access code is: [email or web address]  Expires: 2018  3:00 PM     Your access code will  in 90 days. If you need help or a new code, please call your [place] clinic or [phone].        Care EveryWhere ID     This is your Care EveryWhere ID. This could be used by other organizations to access your [place] medical records  [id number]         Blood Pressure from Last 3 Encounters:   14 129/87   14 125/77   12 116/65    Weight from Last 3 Encounters:   18 228 lb 6.4 oz (103.6 kg)   17 227 lb 6.4 oz (103.1 kg)   16 220 lb (99.8 kg)              We Performed the Following     DISPENSING FEE, BINAURAL HEARING AID     EAR MOLD/INSERT, NONDISPOSABLE, ANY TYPE     HEARING AID BTE DIGITAL, BINAURAL     HEARING AID CHECK, BINAURAL     HEARING AID CONFORMITY EVALUATION     HEARING AID FIT/ORIENTATION/CHECK        Primary Care Provider Office Phone # Fax #    [facility] [phone] [address] [facility] [address]        Equal Access to Services     [facility]: Hadii aad ku hadasho Soomaali, waaxda luqadaha, qaybta kaalmada adeegyada, waxay maudein hayfayn debra mcmullen lajorge luisn ah. So [facility] [phone].    ATENCIÓN: Si habla español, tiene a betancourt disposición servicios gratuitos de asistencia lingüística. [facility] [phone].    We comply with applicable federal civil rights laws and [place] laws. We do not discriminate on the basis of race, color, national origin, age, disability, sex, sexual orientation, or gender identity.            Thank you!     Thank you for choosing [facility]  for your care. Our goal is always to provide you with excellent care. Hearing back from our patients is one way we can continue to improve our services. Please take a few minutes to complete the written survey that you may receive in the mail after your visit with us. Thank you!             Your Updated Medication List - Protect others around you:  Learn how to safely use, store and throw away your medicines at www.disposemymeds.org.          This list is accurate as of 2/20/18  3:39 PM.  Always use your most recent med list.                   Brand Name Dispense Instructions for use Diagnosis    aspirin 81 MG tablet      1 TABLET DAILY        CYCLOBENZAPRINE HCL PO           fish oil-omega-3 fatty acids 1000 MG capsule      2 capsules daily        OMEPRAZOLE PO      Take  by mouth.        simvastatin 80 MG tablet    ZOCOR     1 TABLET EVERY EVENING        VITAMIN D (CHOLECALCIFEROL) PO      Take by mouth daily

## 2018-02-20 NOTE — PROGRESS NOTES
AUDIOLOGY REPORT    SUBJECTIVE: Paulette Dorsey is a 62 year old male who was seen in the Audiology Clinic at the [facility] for a fitting of binaural hearing aids. Previous results have revealed a bilateral asymmetric sensorineural hearing loss sensorineural hearing loss. The patient was given medical clearance to pursue amplification by  Filemon Hummel MD. Patient was accompanied to today's appointment by his wife.     OBJECTIVE: Prior to fitting, a hearing aid check was performed to ensure device functionality.The hearing aid conformity evaluation was completed.The hearing aids were placed and they provided a good fit. Real-ear-probe-microphone measurements were completed on the Vpon system and were an acceptable match to NAL-NL2 target with soft sounds audible, moderate sounds comfortable, and loud sounds below discomfort. UCLs are verified through maximum power output measures and demonstrate appropriate limiting of loud inputs. Paulette was oriented to proper hearing aid use, care, cleaning (no water, dry brush), batteries (size 13, insertion/removal, toxicity, low-battery signal), aid insertion/removal, user booklet, warranty information, storage cases, and other hearing aid details. The patient confirmed understanding of hearing aid use and care, and showed proper insertion of hearing aid and batteries while in the office today.Paulette reported good volume and sound quality today.   Hearing aids were programmed as follows:  Program 1: All-Around  Program 2: Restaurant   Program 3: Music  Program 4: Party    ASSESSMENT: Binaural hearing aid(s) were fit today. Verification measures were performed.   EAR(S) FIT: Binaural  MA HEARING AID MODEL NAME:  ReSound Linx 2 7  MA HEARING AID MODEL NUMBER: [id number]  HEARING AID STYLE: RITE  EARMOLDS/TIP/ LINK: Micromolds with canal locks. Size 3 MP  for the right ear and size 3 HP  for the left ear.   SERIAL NUMBERS: Right:  [id number]; Left: [id number]  WARRANTY END DATE: 3/1/2020  LOSS& DAMAGE END DATE: 3/1/2019  Paulette signed the Hearing Aid Purchase Agreement and was given a copy, as well as details on his hearing aids. Patient is unsure he will like these ReSound hearing aids as they are so different from his Widex hearing aids. He would like the Widex Unique hearing aids ordered as well to have on hand at the next appointment to try if he does not like the ReSound hearing aids.     PLAN:Paulette will return for follow-up in 2-3 weeks for a hearing aid review and possible refitting appointment. Please call this clinic with questions regarding today s appointment.    CHARGES:    Hearing Aid Check: Binaural, 02106, $81.00  Dispensing Fee: Binaural, , $500.00  Fit/Orientation: Binaural, , $322.00  Hearing Aid Conformity Evaluation: , Qty:2 $174  Hearing Aid Digital: Binaural, BTE,  $4523  Earmolds: Binaural  $160  Total: $5760 Bill to patient's insurance.     Tomasz Marin CCC-A  Licensed Audiologist #[id number]  2/20/2018

## 2018-03-09 ENCOUNTER — OFFICE VISIT (OUTPATIENT)
Dept: AUDIOLOGY | Facility: CLINIC | Age: 63
End: 2018-03-09
Payer: COMMERCIAL

## 2018-03-09 DIAGNOSIS — H90.3 SENSORINEURAL HEARING LOSS, ASYMMETRICAL: Primary | ICD-10-CM

## 2018-03-09 PROCEDURE — 99207 ZZC NO CHARGE LOS: CPT | Performed by: AUDIOLOGIST

## 2018-03-09 PROCEDURE — V5020 CONFORMITY EVALUATION: HCPCS | Mod: LT | Performed by: AUDIOLOGIST

## 2018-03-09 PROCEDURE — V5011 HEARING AID FITTING/CHECKING: HCPCS | Mod: RT | Performed by: AUDIOLOGIST

## 2018-03-09 PROCEDURE — V5020 CONFORMITY EVALUATION: HCPCS | Mod: RT | Performed by: AUDIOLOGIST

## 2018-03-09 PROCEDURE — V5261 HEARING AID, DIGIT, BIN, BTE: HCPCS | Mod: NU | Performed by: AUDIOLOGIST

## 2018-03-09 PROCEDURE — V5264 EAR MOLD/INSERT: HCPCS | Mod: RT | Performed by: AUDIOLOGIST

## 2018-03-09 PROCEDURE — 92593 HC HEARING AID CHECK, BINAURAL: CPT | Performed by: AUDIOLOGIST

## 2018-03-09 PROCEDURE — V5160 DISPENSING FEE BINAURAL: HCPCS | Performed by: AUDIOLOGIST

## 2018-03-09 PROCEDURE — V5011 HEARING AID FITTING/CHECKING: HCPCS | Mod: LT | Performed by: AUDIOLOGIST

## 2018-03-09 NOTE — PROGRESS NOTES
AUDIOLOGY REPORT    SUBJECTIVE: Paulette Dorsey is a 62 year old male who was seen in the Audiology Clinic at the [facility] for a fitting of Widex hearing aids. Previous results have revealed a bilateral Asymmetric sensorineural hearing loss. The patient did not like the ReSound Linx 2 7 hearing aids at all. Today we are fitting the Widex Unique 220 RICs. Patient was accompanied to today's appointment by his wife.       OBJECTIVE: Prior to fitting, a hearing aid check was performed to ensure device functionality.The hearing aid conformity evaluation was completed.The hearing aids were placed and they provided a good fit. Real-ear-probe-microphone measurements were completed on the [employer] system and were an acceptable match to NAL-NL2 target with soft sounds audible, moderate sounds comfortable, and loud sounds below discomfort. UCLs are verified through maximum power output measures and demonstrate appropriate limiting of loud inputs. Paulette was oriented to proper hearing aid use, care, cleaning (no water, dry brush), batteries (size 312, insertion/removal, toxicity, low-battery signal), aid insertion/removal, user booklet, warranty information, storage cases, and other hearing aid details. The patient confirmed understanding of hearing aid use and care, and showed proper insertion of hearing aid and batteries while in the office today.Paulette reported good volume and sound quality today.   Hearing aids were programmed as follows:  Program 1: Old Settings   Program 2: Universal (Widex)  Program 3: 3 Hybrid     ASSESSMENT: Binaural hearing aid(s) were fit today. Verification measures were performed.   EAR(S) FIT: Binaural  MA HEARING AID MODEL NAME:  Widex Unique 220  MA HEARING AID MODEL NUMBER: [id number]  HEARING AID STYLE: RITE  EARMOLDS/TIP/ LINK: [employer] earmold with size 3 HP receivers.   SERIAL NUMBERS: Right: [id number]; Left: [id number]  WARRANTY END DATE: 4/2/2020  LOSS & DAMAGE END DATE:  4/20/2019  Paulette signed the Hearing Aid Purchase Agreement and was given a copy, as well as details on his hearing aids.    PLAN:Paulette will return for follow-up in 2-3 weeks for a hearing aid review appointment. Please call this clinic with questions regarding today s appointment.    CHARGES:    Hearing Aid Check: Binaural, 63059, $81.00  Dispensing Fee: Binaural, , $500.00  Fit/Orientation: Binaural, , $322.00  Hearing Aid Conformity Evaluation: , Qty:2 $174  Hearing Aid Digital: Binaural, BTE,  $2523  Earmolds: Qty: 2  $160   Total: $3760 Bill to patient's insurance.     Tomasz Marin CCC-A  Licensed Audiologist #[id number]  3/9/2018

## 2018-03-09 NOTE — MR AVS SNAPSHOT
"              After Visit Summary   3/9/2018    Paulette Dorsey    MRN: [id number]           Patient Information     Date Of Birth          1955        Visit Information        Provider Department      3/9/2018 11:00 AM Tomasz Ahmadi AuD [facility]        Today's Diagnoses     Sensorineural hearing loss, asymmetrical    -  1       Follow-ups after your visit        Your next 10 appointments already scheduled     Mar 23, 2018 10:30 AM CDT   Return Visit with Gary Tamez   [facility] ([facility])    [address] [place] [address]   [phone]              Who to contact     If you have questions or need follow up information about today's clinic visit or your schedule please contact [facility] directly at [phone].  Normal or non-critical lab and imaging results will be communicated to you by MyChart, letter or phone within 4 business days after the clinic has received the results. If you do not hear from us within 7 days, please contact the clinic through MyChart or phone. If you have a critical or abnormal lab result, we will notify you by phone as soon as possible.  Submit refill requests through [employer] or call your pharmacy and they will forward the refill request to us. Please allow 3 business days for your refill to be completed.          Additional Information About Your Visit        MyChart Information     [employer] lets you send messages to your doctor, view your test results, renew your prescriptions, schedule appointments and more. To sign up, go to [email or web address] . Click on \"Log in\" on the left side of the screen, which will take you to the Welcome page. Then click on \"Sign up Now\" on the right side of the page.     You will be asked to enter the access code listed below, as well as some personal information. Please follow the directions to create your username and password.     Your access code " is: [email or web address]  Expires: 2018  3:00 PM     Your access code will  in 90 days. If you need help or a new code, please call your [place] clinic or [phone].        Care EveryWhere ID     This is your Care EveryWhere ID. This could be used by other organizations to access your [place] medical records  [id number]         Blood Pressure from Last 3 Encounters:   14 129/87   14 125/77   12 116/65    Weight from Last 3 Encounters:   18 228 lb 6.4 oz (103.6 kg)   17 227 lb 6.4 oz (103.1 kg)   16 220 lb (99.8 kg)              We Performed the Following     DISPENSING FEE, BINAURAL HEARING AID     EAR MOLD/INSERT, NONDISPOSABLE, ANY TYPE     HEARING AID BTE DIGITAL, BINAURAL     HEARING AID CHECK, BINAURAL     HEARING AID CONFORMITY EVALUATION     HEARING AID FIT/ORIENTATION/CHECK        Primary Care Provider Office Phone # Fax #    [facility] [phone]       [address] [facility] [address]        Equal Access to Services     [facility] AH: Hadii aad ku hadasho Soomaali, waaxda luqadaha, qaybta kaalmada adeegyada, waxay idiin hayaan adeeg benedict laItzaan ah. So [facility] [phone].    ATENCIÓN: Si habla español, tiene a betancourt disposición servicios gratuitos de asistencia lingüística. Alexi al [phone].    We comply with applicable federal civil rights laws and [place] laws. We do not discriminate on the basis of race, color, national origin, age, disability, sex, sexual orientation, or gender identity.            Thank you!     Thank you for choosing [facility]  for your care. Our goal is always to provide you with excellent care. Hearing back from our patients is one way we can continue to improve our services. Please take a few minutes to complete the written survey that you may receive in the mail after your visit with us. Thank you!             Your Updated Medication List - Protect others around you: Learn how to  safely use, store and throw away your medicines at www.disposemymeds.org.          This list is accurate as of 3/9/18 11:42 AM.  Always use your most recent med list.                   Brand Name Dispense Instructions for use Diagnosis    aspirin 81 MG tablet      1 TABLET DAILY        CYCLOBENZAPRINE HCL PO           fish oil-omega-3 fatty acids 1000 MG capsule      2 capsules daily        OMEPRAZOLE PO      Take  by mouth.        simvastatin 80 MG tablet    ZOCOR     1 TABLET EVERY EVENING        VITAMIN D (CHOLECALCIFEROL) PO      Take by mouth daily

## 2018-03-23 ENCOUNTER — OFFICE VISIT (OUTPATIENT)
Dept: AUDIOLOGY | Facility: CLINIC | Age: 63
End: 2018-03-23
Payer: COMMERCIAL

## 2018-03-23 DIAGNOSIS — H90.3 SENSORINEURAL HEARING LOSS, ASYMMETRICAL: Primary | ICD-10-CM

## 2018-03-23 PROCEDURE — V5299 HEARING SERVICE: HCPCS | Performed by: AUDIOLOGIST

## 2018-03-23 NOTE — PROGRESS NOTES
"AUDIOLOGY REPORT    SUBJECTIVE:Paulette Dorsey is a 62 year old male who was seen in the Audiology Clinic at the [facility] on 3/23/2018  for a follow-up check regarding the fitting of new hearing aids. Previous results have revealed asymmetric sensorineural hearing loss.  The patient has been seen previously in this clinic and was fit with binaural hearing aids on 3/9/2018.  Paulette reports increased wear time with the heaing aids, other sounds too boomy (when walking or chewing) and the right ear cuts out during his vocalizations. Patient feels the left ear needs to be a little louder.     OBJECTIVE:     Based on patient report, the following changes were made;We are sending the hearing aids back to have repairs to the receivers made as there are no vent holes and this may be contributing to the issues the patient is reporting.     Reviewed 45 day trial period, care, cleaning (no water, dry brush), batteries (size 312) insertion/removal, toxicity, low-battery signal), aid insertion/removal, volume adjustment (if applicable), user booklet, warranty information, storage cases, and other hearing aid details.        ASSESSMENT: A follow-up appointment for hearing aid fitting was completed today. Changes to hearing aid was completed as outlined above.     PLAN:Paulette will be called once the receivers are back from repair for an \"add on\" appointment to refit the hearing aids. Please call this clinic with any questions regarding today s appointment.    Tomasz Marin CCC-A  Licensed Audiologist #[id number]  3/23/2018      "

## 2018-03-23 NOTE — MR AVS SNAPSHOT
"              After Visit Summary   3/23/2018    Paulette Dorsey    MRN: [id number]           Patient Information     Date Of Birth          1955        Visit Information        Provider Department      3/23/2018 10:30 AM Tomasz Ahmadi [facility]D [facility]        Today's Diagnoses     Sensorineural hearing loss, asymmetrical    -  1       Follow-ups after your visit        Who to contact     If you have questions or need follow up information about today's clinic visit or your schedule please contact [facility] directly at [phone].  Normal or non-critical lab and imaging results will be communicated to you by Collectahart, letter or phone within 4 business days after the clinic has received the results. If you do not hear from us within 7 days, please contact the clinic through Collectahart or phone. If you have a critical or abnormal lab result, we will notify you by phone as soon as possible.  Submit refill requests through [employer] or call your pharmacy and they will forward the refill request to us. Please allow 3 business days for your refill to be completed.          Additional Information About Your Visit        MyChart Information     O[employer] lets you send messages to your doctor, view your test results, renew your prescriptions, schedule appointments and more. To sign up, go to [email or web address] . Click on \"Log in\" on the left side of the screen, which will take you to the Welcome page. Then click on \"Sign up Now\" on the right side of the page.     You will be asked to enter the access code listed below, as well as some personal information. Please follow the directions to create your username and password.     Your access code is: [email or web address]  Expires: 2018  4:00 PM     Your access code will  in 90 days. If you need help or a new code, please call your [facility] or [phone].        Care EveryWhere ID     This is your Care EveryWhere ID. This could be " used by other organizations to access your [place] medical records  [id number]         Blood Pressure from Last 3 Encounters:   04/24/14 129/87   01/09/14 125/77   11/28/12 116/65    Weight from Last 3 Encounters:   01/24/18 228 lb 6.4 oz (103.6 kg)   04/25/17 227 lb 6.4 oz (103.1 kg)   06/30/16 220 lb (99.8 kg)              We Performed the Following     HEARING AID CHECK/NO CHARGE        Primary Care Provider Office Phone # Fax #    [facility] [phone] [address] [facility] [address]        Equal Access to Services     DIEGO CASILLAS : Hadii smith roseo Soaye, waaxda luqadaha, qaybta kaalmada adedenishayada, rudy carrillo. So [facility] [place] [facility] [phone].    ATENCIÓN: Si habla español, tiene a betancourt disposición servicios gratuitos de asistencia lingüística. Llame al [phone].    We comply with applicable federal civil rights laws and [place] laws. We do not discriminate on the basis of race, color, national origin, age, disability, sex, sexual orientation, or gender identity.            Thank you!     Thank you for choosing [facility]  for your care. Our goal is always to provide you with excellent care. Hearing back from our patients is one way we can continue to improve our services. Please take a few minutes to complete the written survey that you may receive in the mail after your visit with us. Thank you!             Your Updated Medication List - Protect others around you: Learn how to safely use, store and throw away your medicines at www.disposemymeds.org.          This list is accurate as of 3/23/18 10:56 AM.  Always use your most recent med list.                   Brand Name Dispense Instructions for use Diagnosis    aspirin 81 MG tablet      1 TABLET DAILY        CYCLOBENZAPRINE HCL PO           fish oil-omega-3 fatty acids 1000 MG capsule      2 capsules daily        OMEPRAZOLE PO      Take  by mouth.        simvastatin 80  MG tablet    ZOCOR     1 TABLET EVERY EVENING        VITAMIN D (CHOLECALCIFEROL) PO      Take by mouth daily

## 2018-04-09 ENCOUNTER — OFFICE VISIT (OUTPATIENT)
Dept: AUDIOLOGY | Facility: CLINIC | Age: 63
End: 2018-04-09
Payer: COMMERCIAL

## 2018-04-09 DIAGNOSIS — H90.3 SENSORINEURAL HEARING LOSS, ASYMMETRICAL: Primary | ICD-10-CM

## 2018-04-09 PROCEDURE — V5299 HEARING SERVICE: HCPCS | Performed by: AUDIOLOGIST

## 2018-04-09 PROCEDURE — 99207 ZZC NO CHARGE LOS: CPT | Performed by: AUDIOLOGIST

## 2018-04-09 NOTE — PROGRESS NOTES
"AUDIOLOGY REPORT    SUBJECTIVE:Paulette Dorsey is a 62 year old male who was seen in the Audiology Clinic at the [facility] on 4/9/2018  for a follow-up check regarding the fitting of new hearing aids. Previous results have revealed asymmetric sensorineural hearing loss.  The patient has been seen previously in this clinic and was fit with binaural Widex hearing aids on 3/9/2018. Paulette is here to  his remade earmolds with new venting and shorter canal length. Patient was accompanied to today's appointment by his wife.     OBJECTIVE:     Based on patient report, the following changes were made; We moved the program 3 [Hybrid program from  recommendations and his old hearing aid settings] to be the start up program. Patient reports initial improvement in own voice and the \"booming\" sound/sensation is gone, patient is very pleased.     Reviewed 45 day trial period, care, cleaning (no water, dry brush), batteries (size 312) insertion/removal, toxicity, low-battery signal), aid insertion/removal, volume adjustment (if applicable), user booklet, warranty information, storage cases, and other hearing aid details.      ASSESSMENT: A follow-up appointment for hearing aid fitting was completed today. Changes to hearing aid was completed as outlined above.     PLAN:Paulette will return for follow-up as needed, in 2-3 weeks for another appointemnt to check on progress.. Please call this clinic with any questions regarding today s appointment.    Tomasz Marin CCC-A  Licensed Audiologist #[id number]  4/9/2018      "

## 2018-04-09 NOTE — MR AVS SNAPSHOT
"              After Visit Summary   2018    Paulette Dorsey    MRN: [id number]           Patient Information     Date Of Birth          1955        Visit Information        Provider Department      2018 5:00 PM Tomasz Ahmadi AuD [facility]        Today's Diagnoses     Sensorineural hearing loss, asymmetrical    -  1       Follow-ups after your visit        Who to contact     If you have questions or need follow up information about today's clinic visit or your schedule please contact [facility] directly at [phone].  Normal or non-critical lab and imaging results will be communicated to you by 2heuresavanthart, letter or phone within 4 business days after the clinic has received the results. If you do not hear from us within 7 days, please contact the clinic through 2heuresavanthart or phone. If you have a critical or abnormal lab result, we will notify you by phone as soon as possible.  Submit refill requests through [employer] or call your pharmacy and they will forward the refill request to us. Please allow 3 business days for your refill to be completed.          Additional Information About Your Visit        MyChart Information     [employer] lets you send messages to your doctor, view your test results, renew your prescriptions, schedule appointments and more. To sign up, go to [email or web address] . Click on \"Log in\" on the left side of the screen, which will take you to the Welcome page. Then click on \"Sign up Now\" on the right side of the page.     You will be asked to enter the access code listed below, as well as some personal information. Please follow the directions to create your username and password.     Your access code is: [email or web address]  Expires: 2018  4:00 PM     Your access code will  in 90 days. If you need help or a new code, please call your [facility] or [phone].        Care EveryWhere ID     This is your Care EveryWhere ID. This could be used " by other organizations to access your [place] medical records  [id number]         Blood Pressure from Last 3 Encounters:   04/24/14 129/87   01/09/14 125/77   11/28/12 116/65    Weight from Last 3 Encounters:   01/24/18 228 lb 6.4 oz (103.6 kg)   04/25/17 227 lb 6.4 oz (103.1 kg)   06/30/16 220 lb (99.8 kg)              We Performed the Following     HEARING AID CHECK/NO CHARGE        Primary Care Provider Office Phone # Fax #    [facility] [phone] [address] [facility] [address]        Equal Access to Services     DIEGO CASILLAS : Hadii smith roseo Soaye, waaxda luqadaha, qaybta kaalmada adedenishayada, rudy carrillo. So [facility] [phone].    ATENCIÓN: Si habla español, tiene a betancourt disposición servicios gratuitos de asistencia lingüística. Llame al [phone].    We comply with applicable federal civil rights laws and [place] laws. We do not discriminate on the basis of race, color, national origin, age, disability, sex, sexual orientation, or gender identity.            Thank you!     Thank you for choosing [facility]  for your care. Our goal is always to provide you with excellent care. Hearing back from our patients is one way we can continue to improve our services. Please take a few minutes to complete the written survey that you may receive in the mail after your visit with us. Thank you!             Your Updated Medication List - Protect others around you: Learn how to safely use, store and throw away your medicines at www.disposemymeds.org.          This list is accurate as of 4/9/18  5:14 PM.  Always use your most recent med list.                   Brand Name Dispense Instructions for use Diagnosis    aspirin 81 MG tablet      1 TABLET DAILY        CYCLOBENZAPRINE HCL PO           fish oil-omega-3 fatty acids 1000 MG capsule      2 capsules daily        OMEPRAZOLE PO      Take  by mouth.        simvastatin 80 MG  tablet    ZOCOR     1 TABLET EVERY EVENING        VITAMIN D (CHOLECALCIFEROL) PO      Take by mouth daily

## 2018-04-30 ENCOUNTER — TELEPHONE (OUTPATIENT)
Dept: AUDIOLOGY | Facility: CLINIC | Age: 63
End: 2018-04-30
Payer: COMMERCIAL

## 2018-04-30 DIAGNOSIS — H90.3 SENSORINEURAL HEARING LOSS, ASYMMETRICAL: Primary | ICD-10-CM

## 2018-04-30 PROCEDURE — V5266 BATTERY FOR HEARING DEVICE: HCPCS | Performed by: AUDIOLOGIST

## 2018-04-30 NOTE — TELEPHONE ENCOUNTER
Wife states patient is needing hearing aid batteries. Please call number above when ready for .    Thank you.

## 2018-04-30 NOTE — TELEPHONE ENCOUNTER
Patient's wife calls to request hs 3 month supply of batteries be placed at the 2nd floor  for . Placed 24 size 312 batteries at the 2nd floor .     CHARGES:   .002 x24 Batteries ($1). Total: $24 Bill to patient's insurance.    Tomasz Marin CCC-A  Licensed Audiologist #[id number]  4/30/2018

## 2018-05-07 ENCOUNTER — OFFICE VISIT (OUTPATIENT)
Dept: AUDIOLOGY | Facility: CLINIC | Age: 63
End: 2018-05-07
Payer: COMMERCIAL

## 2018-05-07 DIAGNOSIS — H90.3 SENSORINEURAL HEARING LOSS, ASYMMETRICAL: Primary | ICD-10-CM

## 2018-05-07 PROCEDURE — V5299 HEARING SERVICE: HCPCS | Performed by: AUDIOLOGIST

## 2018-05-07 NOTE — PROGRESS NOTES
"HEARING AID RECHECK    Patient Name:  Paulette Dorsey    Patient Age:   62 year old    :  1955    Background:   Patient is here today with the complaint of hearing aids \"clamping down\" when working with music, \"cutting out\" when vocalizing, and patient feels the right ear hearing aids needs a bit of a gain boost. Patient was accompanied to today's appointment by his wife and grandson.     Procedures:   Today we moved the settings from Program 3 [hybrid program] to program 1 [universial] and removed the \"old settings\" program. We replaced the removed program with a Music program and moved that to program spot 2. So the new program list is:   1: Universal [New Hybrid]   2: Music   3: Concert [Old Hybrid]      Today we also increased gain for moderate inputs for the right ear from 2847-6228 Hz 2 dB. We are also requesting a remake of the current earmolds to make the canal portion shorter.    Plan:   Patient will be called to set up an appointment when the new earmolds are in.       NO CHARGE VISIT    Tomasz Marin CCC-A  Licensed Audiologist  2018      "

## 2018-05-07 NOTE — MR AVS SNAPSHOT
"              After Visit Summary   2018    Paulette Dorsey    MRN: [id number]           Patient Information     Date Of Birth          1955        Visit Information        Provider Department      2018 2:30 PM Tomasz Ahmadi AuD [facility]        Today's Diagnoses     Sensorineural hearing loss, asymmetrical    -  1       Follow-ups after your visit        Who to contact     If you have questions or need follow up information about today's clinic visit or your schedule please contact [facility] directly at [phone].  Normal or non-critical lab and imaging results will be communicated to you by IntellectSpacehart, letter or phone within 4 business days after the clinic has received the results. If you do not hear from us within 7 days, please contact the clinic through [employer]lect[employer] or phone. If you have a critical or abnormal lab result, we will notify you by phone as soon as possible.  Submit refill requests through [employer] or call your pharmacy and they will forward the refill request to us. Please allow 3 business days for your refill to be completed.          Additional Information About Your Visit        MyChart Information     [employer] lets you send messages to your doctor, view your test results, renew your prescriptions, schedule appointments and more. To sign up, go to [email or web address] . Click on \"Log in\" on the left side of the screen, which will take you to the Welcome page. Then click on \"Sign up Now\" on the right side of the page.     You will be asked to enter the access code listed below, as well as some personal information. Please follow the directions to create your username and password.     Your access code is: [email or web address]  Expires: 2018  2:58 PM     Your access code will  in 90 days. If you need help or a new code, please call your [facility] or [phone].        Care EveryWhere ID     This is your Care EveryWhere ID. This could be used " by other organizations to access your [place] medical records  [id number]         Blood Pressure from Last 3 Encounters:   04/24/14 129/87   01/09/14 125/77   11/28/12 116/65    Weight from Last 3 Encounters:   01/24/18 228 lb 6.4 oz (103.6 kg)   04/25/17 227 lb 6.4 oz (103.1 kg)   06/30/16 220 lb (99.8 kg)              We Performed the Following     HEARING AID CHECK/NO CHARGE        Primary Care Provider Office Phone # Fax #    [facility] [phone] [address] [address]        Equal Access to Services     DIEGO CASILLAS : Hadii smith roseo Soaye, waaxda luqadaha, qaybta kaalmada adedenishayada, rudy carrillo. So [facility] [phone].    ATENCIÓN: Si habla español, tiene a betancourt disposición servicios gratuitos de asistencia lingüística. Llame al [phone].    We comply with applicable federal civil rights laws and [place] laws. We do not discriminate on the basis of race, color, national origin, age, disability, sex, sexual orientation, or gender identity.            Thank you!     Thank you for choosing [facility]  for your care. Our goal is always to provide you with excellent care. Hearing back from our patients is one way we can continue to improve our services. Please take a few minutes to complete the written survey that you may receive in the mail after your visit with us. Thank you!             Your Updated Medication List - Protect others around you: Learn how to safely use, store and throw away your medicines at www.disposemymeds.org.          This list is accurate as of 5/7/18  2:58 PM.  Always use your most recent med list.                   Brand Name Dispense Instructions for use Diagnosis    aspirin 81 MG tablet      1 TABLET DAILY        CYCLOBENZAPRINE HCL PO           fish oil-omega-3 fatty acids 1000 MG capsule      2 capsules daily        OMEPRAZOLE PO      Take  by mouth.        simvastatin 80 MG  tablet    ZOCOR     1 TABLET EVERY EVENING        VITAMIN D (CHOLECALCIFEROL) PO      Take by mouth daily

## 2018-05-22 ENCOUNTER — OFFICE VISIT (OUTPATIENT)
Dept: AUDIOLOGY | Facility: CLINIC | Age: 63
End: 2018-05-22
Payer: COMMERCIAL

## 2018-05-22 DIAGNOSIS — H90.3 SENSORINEURAL HEARING LOSS, ASYMMETRICAL: Primary | ICD-10-CM

## 2018-05-22 PROCEDURE — V5299 HEARING SERVICE: HCPCS | Performed by: AUDIOLOGIST

## 2018-05-22 PROCEDURE — 99207 ZZC NO CHARGE LOS: CPT | Performed by: AUDIOLOGIST

## 2018-05-22 NOTE — PROGRESS NOTES
"AUDIOLOGY REPORT: HEARING AID RECHECK    SUBJECTIVE: Paulette Dorsey is a 62 year old male, :  1955, was seen in the Audiology Clinic at  [facility] on 18 for a return check of their hearing aids. The patient was accompanied by their wife.      Background:   Patient is here today to be refit with new earmolds with the canal portion shortened. As well we will make some adjustments to the hearing aids to provide better comfort.     Procedures:   We are unable to \"swap\" the  into the new earmolds, Bella failed to inform us this needs to be done at the factory. We will send the earmolds back and have the HP receivers added then swap the whole /earmold combo at a later visit.    Per patient request we reduced gain for moderate and soft inputs from 250-5000 Hz 2 dB in the right ear and 3 dB in the left. Program 3 is a copy of program 1 with another reduction of 3 dB for all input levels at all frequencies bilaterally. The program change volume is reduced 12 dB bilaterally. The new list of program names is as follows:     Program 1: NORMAL (Universal)   Program 2: MUSIC   Program 3: CONCERT (Universal -3)     Plan:   Patient will be contacted when the re-replacement earmolds and receivers are here to set up a follow up appointment.  Patient will return as needed for hearing aid concerns.     NO CHARGE VISIT    Tomasz Marin CCC-A  Licensed Audiologist  2018      "

## 2018-05-22 NOTE — MR AVS SNAPSHOT
"              After Visit Summary   2018    Paulette Dorsey    MRN: [id number]           Patient Information     Date Of Birth          1955        Visit Information        Provider Department      2018 12:30 PM [facility]        Today's Diagnoses     Sensorineural hearing loss, asymmetrical    -  1       Follow-ups after your visit        Who to contact     If you have questions or need follow up information about today's clinic visit or your schedule please contact [facility] directly at [phone].  Normal or non-critical lab and imaging results will be communicated to you by [employer] Mobilehart, letter or phone within 4 business days after the clinic has received the results. If you do not hear from us within 7 days, please contact the clinic through [employer] or phone. If you have a critical or abnormal lab result, we will notify you by phone as soon as possible.  Submit refill requests through [employer] or call your pharmacy and they will forward the refill request to us. Please allow 3 business days for your refill to be completed.          Additional Information About Your Visit        MyChart Information     [employer] lets you send messages to your doctor, view your test results, renew your prescriptions, schedule appointments and more. To sign up, go to [email or web address] . Click on \"Log in\" on the left side of the screen, which will take you to the Welcome page. Then click on \"Sign up Now\" on the right side of the page.     You will be asked to enter the access code listed below, as well as some personal information. Please follow the directions to create your username and password.     Your access code is: [email or web address]  Expires: 2018  2:58 PM     Your access code will  in 90 days. If you need help or a new code, please call your [facility] or [phone].        Care EveryWhere ID     This is your Care EveryWhere ID. This could be " used by other organizations to access your [place] medical records  [id number]         Blood Pressure from Last 3 Encounters:   04/24/14 129/87   01/09/14 125/77   11/28/12 116/65    Weight from Last 3 Encounters:   01/24/18 228 lb 6.4 oz (103.6 kg)   04/25/17 227 lb 6.4 oz (103.1 kg)   06/30/16 220 lb (99.8 kg)              We Performed the Following     HEARING AID CHECK/NO CHARGE        Primary Care Provider Office Phone # Fax #    [facility] [phone] [address] [address]        Equal Access to Services     DIEGO CASILLAS : Hadii smith roseo Soaye, waaxda luqadaha, qaybta kaalmada adedenishayada, rudy carrillo. So [facility] [phone].    ATENCIÓN: Si habla español, tiene a betancourt disposición servicios gratuitos de asistencia lingüística. Llame al [phone].    We comply with applicable federal civil rights laws and [place] laws. We do not discriminate on the basis of race, color, national origin, age, disability, sex, sexual orientation, or gender identity.            Thank you!     Thank you for choosing [facility]  for your care. Our goal is always to provide you with excellent care. Hearing back from our patients is one way we can continue to improve our services. Please take a few minutes to complete the written survey that you may receive in the mail after your visit with us. Thank you!             Your Updated Medication List - Protect others around you: Learn how to safely use, store and throw away your medicines at www.disposemymeds.org.          This list is accurate as of 5/22/18  1:09 PM.  Always use your most recent med list.                   Brand Name Dispense Instructions for use Diagnosis    aspirin 81 MG tablet      1 TABLET DAILY        CYCLOBENZAPRINE HCL PO           fish oil-omega-3 fatty acids 1000 MG capsule      2 capsules daily        OMEPRAZOLE PO      Take  by mouth.        simvastatin 80  MG tablet    ZOCOR     1 TABLET EVERY EVENING        VITAMIN D (CHOLECALCIFEROL) PO      Take by mouth daily

## 2018-06-04 ENCOUNTER — OFFICE VISIT (OUTPATIENT)
Dept: AUDIOLOGY | Facility: CLINIC | Age: 63
End: 2018-06-04
Payer: COMMERCIAL

## 2018-06-04 DIAGNOSIS — H90.3 SENSORINEURAL HEARING LOSS, ASYMMETRICAL: Primary | ICD-10-CM

## 2018-06-04 PROCEDURE — 99207 ZZC NO CHARGE LOS: CPT | Performed by: AUDIOLOGIST

## 2018-06-04 PROCEDURE — V5299 HEARING SERVICE: HCPCS | Performed by: AUDIOLOGIST

## 2018-06-04 NOTE — PROGRESS NOTES
AUDIOLOGY REPORT    SUBJECTIVE: Paulette Dorsey is a 62 year old male was seen in the Audiology Clinic at  [facility] on 6/04/18 to be fit with new earmold/s. The patient was accompanied by their wife. Paulette has been seen previously on 5/22/2018, and results revealed an asymmetric sensorineural hearing loss.      OBJECTIVE:    Otoscopy revealed ears are clear of cerumen bilaterally.     New earmold/s were fit bilaterally. The earmold/s fit comfortably and securely. Patient reports good physical fit and comfort with the new earmold/s. Patient demonstrated ease of insertion and removal of the new earmold/s. Patient does report the right ear seems louder with the new earmold. The earmolds are 1-2 mm shorter then the previous pair.      ASSESSMENT:   Asymmetric sensorineural hearing loss      Reviewed warranty information with patient. The 90 day remake period was explained to patient.    PLAN: Paulette will evaluate the new earmold/s and return if further adjustments are needed. Please contact this clinic with any questions or concerns.    NO CHARGE VISIT    Tomasz Marin CCC-A  Licensed Audiologist #[id number]  6/4/2018

## 2018-06-04 NOTE — MR AVS SNAPSHOT
"              After Visit Summary   2018    Paulette Dorsey    MRN: [id number]           Patient Information     Date Of Birth          1955        Visit Information        Provider Department      2018 2:30 PM [facility]        Today's Diagnoses     Sensorineural hearing loss, asymmetrical    -  1       Follow-ups after your visit        Who to contact     If you have questions or need follow up information about today's clinic visit or your schedule please contact [facility] directly at [phone].  Normal or non-critical lab and imaging results will be communicated to you by [employer] Fishinghart, letter or phone within 4 business days after the clinic has received the results. If you do not hear from us within 7 days, please contact the clinic through [employer] or phone. If you have a critical or abnormal lab result, we will notify you by phone as soon as possible.  Submit refill requests through [employer] or call your pharmacy and they will forward the refill request to us. Please allow 3 business days for your refill to be completed.          Additional Information About Your Visit        MyChart Information     [employer] lets you send messages to your doctor, view your test results, renew your prescriptions, schedule appointments and more. To sign up, go to [email or web address] . Click on \"Log in\" on the left side of the screen, which will take you to the Welcome page. Then click on \"Sign up Now\" on the right side of the page.     You will be asked to enter the access code listed below, as well as some personal information. Please follow the directions to create your username and password.     Your access code is: [email or web address]  Expires: 2018  2:58 PM     Your access code will  in 90 days. If you need help or a new code, please call your [facility] or [phone].        Care EveryWhere ID     This is your Care EveryWhere ID. This could be used " by other organizations to access your [place] medical records  [id number]         Blood Pressure from Last 3 Encounters:   04/24/14 129/87   01/09/14 125/77   11/28/12 116/65    Weight from Last 3 Encounters:   01/24/18 228 lb 6.4 oz (103.6 kg)   04/25/17 227 lb 6.4 oz (103.1 kg)   06/30/16 220 lb (99.8 kg)              We Performed the Following     HEARING AID CHECK/NO CHARGE        Primary Care Provider Office Phone # Fax #    [facility] [phone] [address] [facility] [address] [facility] [address] [facility] [address]        Equal Access to Services     DIEGO CASILLAS : Hadii smith roseo Soaye, waaxda luqadaha, qaybta kaalmada adedenishayada, rudy carrillo. So [facility] [phone].    ATENCIÓN: Si habla español, tiene a betancourt disposición servicios gratuitos de asistencia lingüística. Llame al [phone].    We comply with applicable federal civil rights laws and [place] laws. We do not discriminate on the basis of race, color, national origin, age, disability, sex, sexual orientation, or gender identity.            Thank you!     Thank you for choosing [facility]  for your care. Our goal is always to provide you with excellent care. Hearing back from our patients is one way we can continue to improve our services. Please take a few minutes to complete the written survey that you may receive in the mail after your visit with us. Thank you!             Your Updated Medication List - Protect others around you: Learn how to safely use, store and throw away your medicines at www.disposemymeds.org.          This list is accurate as of 6/4/18  2:41 PM.  Always use your most recent med list.                   Brand Name Dispense Instructions for use Diagnosis    aspirin 81 MG tablet      1 TABLET DAILY        CYCLOBENZAPRINE HCL PO           fish oil-omega-3 fatty acids 1000 MG capsule      2 capsules daily        OMEPRAZOLE PO      Take  by mouth.        simvastatin 80 MG  tablet    ZOCOR     1 TABLET EVERY EVENING        VITAMIN D (CHOLECALCIFEROL) PO      Take by mouth daily

## 2018-07-13 ENCOUNTER — TELEPHONE (OUTPATIENT)
Dept: AUDIOLOGY | Facility: CLINIC | Age: 63
End: 2018-07-13

## 2018-07-13 NOTE — TELEPHONE ENCOUNTER
Reason for Call:  Other call back    Detailed comments:  Patient calling to get hearing aid batteries. Please call when ready to .     Phone Number Patient can be reached at: Home number on file [phone] (home)    Best Time:  any    Can we leave a detailed message on this number? YES    Call taken on 7/13/2018 at 8:24 AM by Eugenia Yee

## 2018-07-16 NOTE — TELEPHONE ENCOUNTER
Patient has been scheduled for an appointment tomorrow at 3pm. He will pick his batteries up then.     -Tomasz Marin CCC-A

## 2018-07-17 ENCOUNTER — OFFICE VISIT (OUTPATIENT)
Dept: AUDIOLOGY | Facility: CLINIC | Age: 63
End: 2018-07-17
Payer: COMMERCIAL

## 2018-07-17 DIAGNOSIS — H90.3 SENSORINEURAL HEARING LOSS, BILATERAL: Primary | ICD-10-CM

## 2018-07-17 PROCEDURE — V5299 HEARING SERVICE: HCPCS | Performed by: AUDIOLOGIST

## 2018-07-17 PROCEDURE — 99207 ZZC NO CHARGE LOS: CPT | Performed by: AUDIOLOGIST

## 2018-07-17 NOTE — PROGRESS NOTES
AUDIOLOGY REPORT: HEARING AID RECHECK    SUBJECTIVE: Paulette Dorsey is a 63 year old male, :  1955, was seen in the Audiology Clinic at  [facility] on 18 for a return check of their hearing aids. The patient was accompanied by their wife.      Background:   Patient is here today to  his replaced right earmold. Patient would also like some further adjustments to the hearing aids.     Procedures:   Today we connected the hearing aids to the computer and began reprogramming by decreasing the gain for all frequencies at all input levels 2 dB in the right ear in program 1. In program 3 we reduced gain for all frequencies at all input levels 3 dB bilaterally.     Plan:   Patient will evaluate the current hearing aid programing and return if further adjustments are needed.  Patient will return as needed for hearing aid concerns.     NO CHARGE VISIT    Tomasz Marin CCC-A  Licensed Audiologist  2018

## 2018-07-17 NOTE — MR AVS SNAPSHOT
After Visit Summary   7/17/2018    Paulette Dorsey    MRN: [id number]           Patient Information     Date Of Birth          1955        Visit Information        Provider Department      7/17/2018 3:00 PM Tomasz Ahmadi AuD [facility]        Today's Diagnoses     Sensorineural hearing loss, bilateral    -  1       Follow-ups after your visit        Who to contact     If you have questions or need follow up information about today's clinic visit or your schedule please contact [facility] directly at [phone].  Normal or non-critical lab and imaging results will be communicated to you by MyChart, letter or phone within 4 business days after the clinic has received the results. If you do not hear from us within 7 days, please contact the clinic through MyChart or phone. If you have a critical or abnormal lab result, we will notify you by phone as soon as possible.  Submit refill requests through [employer] or call your pharmacy and they will forward the refill request to us. Please allow 3 business days for your refill to be completed.          Additional Information About Your Visit        Care EveryWhere ID     This is your Care EveryWhere ID. This could be used by other organizations to access your [facility] medical records  [id number]         Blood Pressure from Last 3 Encounters:   04/24/14 129/87   01/09/14 125/77   11/28/12 116/65    Weight from Last 3 Encounters:   01/24/18 228 lb 6.4 oz (103.6 kg)   04/25/17 227 lb 6.4 oz (103.1 kg)   06/30/16 220 lb (99.8 kg)              We Performed the Following     HEARING AID CHECK/NO CHARGE        Primary Care Provider Office Phone # Fax #    [facility] [phone]       [address] [address]        Equal Access to Services     DIEGO CASILLAS AH: Suzie roseo Soaye, waaxda luqadaha, qaybta kaalmada adeegyada, rudy carrillo. So [facility]  [phone].    ATENCIÓN: Si pollo mohr, tiene a betancourt disposición servicios gratuitos de asistencia lingüística. Alexi rob [phone].    We comply with applicable federal civil rights laws and [place] laws. We do not discriminate on the basis of race, color, national origin, age, disability, sex, sexual orientation, or gender identity.            Thank you!     Thank you for choosing [facility]  for your care. Our goal is always to provide you with excellent care. Hearing back from our patients is one way we can continue to improve our services. Please take a few minutes to complete the written survey that you may receive in the mail after your visit with us. Thank you!             Your Updated Medication List - Protect others around you: Learn how to safely use, store and throw away your medicines at www.disposemymeds.org.          This list is accurate as of 7/17/18  3:24 PM.  Always use your most recent med list.                   Brand Name Dispense Instructions for use Diagnosis    aspirin 81 MG tablet      1 TABLET DAILY        CYCLOBENZAPRINE HCL PO           fish oil-omega-3 fatty acids 1000 MG capsule      2 capsules daily        OMEPRAZOLE PO      Take  by mouth.        simvastatin 80 MG tablet    ZOCOR     1 TABLET EVERY EVENING        VITAMIN D (CHOLECALCIFEROL) PO      Take by mouth daily

## 2018-07-31 DIAGNOSIS — H90.3 SENSORINEURAL HEARING LOSS, BILATERAL: Primary | ICD-10-CM

## 2018-07-31 PROCEDURE — V5266 BATTERY FOR HEARING DEVICE: HCPCS | Performed by: AUDIOLOGIST

## 2018-08-01 ENCOUNTER — TELEPHONE (OUTPATIENT)
Dept: AUDIOLOGY | Facility: CLINIC | Age: 63
End: 2018-08-01

## 2018-08-01 ENCOUNTER — ALLIED HEALTH/NURSE VISIT (OUTPATIENT)
Dept: AUDIOLOGY | Facility: CLINIC | Age: 63
End: 2018-08-01
Payer: COMMERCIAL

## 2018-08-01 DIAGNOSIS — H90.3 SENSORINEURAL HEARING LOSS, BILATERAL: Primary | ICD-10-CM

## 2018-08-01 PROCEDURE — 99207 ZZC NO CHARGE LOS: CPT | Performed by: AUDIOLOGIST

## 2018-08-01 PROCEDURE — V5299 HEARING SERVICE: HCPCS | Performed by: AUDIOLOGIST

## 2018-08-01 NOTE — MR AVS SNAPSHOT
After Visit Summary   8/1/2018    Paulette Dorsey    MRN: [id number]           Patient Information     Date Of Birth          1955        Visit Information        Provider Department      8/1/2018 2:00 PM Bee Scruggs AuD [facility]        Today's Diagnoses     Sensorineural hearing loss, bilateral    -  1       Follow-ups after your visit        Who to contact     If you have questions or need follow up information about today's clinic visit or your schedule please contact [facility] directly at [phone].  Normal or non-critical lab and imaging results will be communicated to you by MyChart, letter or phone within 4 business days after the clinic has received the results. If you do not hear from us within 7 days, please contact the clinic through MyChart or phone. If you have a critical or abnormal lab result, we will notify you by phone as soon as possible.  Submit refill requests through [employer] or call your pharmacy and they will forward the refill request to us. Please allow 3 business days for your refill to be completed.          Additional Information About Your Visit        Care EveryWhere ID     This is your Care EveryWhere ID. This could be used by other organizations to access your [facility] medical records  [id number]         Blood Pressure from Last 3 Encounters:   04/24/14 129/87   01/09/14 125/77   11/28/12 116/65    Weight from Last 3 Encounters:   01/24/18 228 lb 6.4 oz (103.6 kg)   04/25/17 227 lb 6.4 oz (103.1 kg)   06/30/16 220 lb (99.8 kg)              We Performed the Following     HEARING AID CHECK/NO CHARGE        Primary Care Provider Office Phone # Fax #    [facility] [phone]       [address] [facility] [address]        Equal Access to Services     DIEGO CASILLAS : Suzie Kaur, waclemenciada luqadaha, qaybta kaalmada areli, rudy carrillo. So [facility]  [phone].    ATENCIÓN: Si pollo mohr, tiene a betancourt disposición servicios gratuitos de asistencia lingüística. Alexi rob [phone].    We comply with applicable federal civil rights laws and [place] laws. We do not discriminate on the basis of race, color, national origin, age, disability, sex, sexual orientation, or gender identity.            Thank you!     Thank you for choosing [facility]  for your care. Our goal is always to provide you with excellent care. Hearing back from our patients is one way we can continue to improve our services. Please take a few minutes to complete the written survey that you may receive in the mail after your visit with us. Thank you!             Your Updated Medication List - Protect others around you: Learn how to safely use, store and throw away your medicines at www.disposemymeds.org.          This list is accurate as of 8/1/18  4:22 PM.  Always use your most recent med list.                   Brand Name Dispense Instructions for use Diagnosis    aspirin 81 MG tablet      1 TABLET DAILY        CYCLOBENZAPRINE HCL PO           fish oil-omega-3 fatty acids 1000 MG capsule      2 capsules daily        OMEPRAZOLE PO      Take  by mouth.        simvastatin 80 MG tablet    ZOCOR     1 TABLET EVERY EVENING        VITAMIN D (CHOLECALCIFEROL) PO      Take by mouth daily

## 2018-08-01 NOTE — PROGRESS NOTES
HEARING AID RECHECK    Patient Name:  Paulette Dorsey    Patient Age:   63 year old    :  1955    Background:   Patient dropped off his left Widex Unique 220 Fusion ALLYSSA due to a broken . He reports that he accidentally cut the  wire while shaving.    Procedures:   Broken  wire will require  repair as patient has embedded receivers. Earmold and  sent for repair. Set up patient's hearing aid with a size 3P  and double dome for him to use in the interim. Spoke with patient's wife and informed her that patient could come  his hearing aid.    Plan:   Patient will be contacted when his earmold and  wire are back from repair. Will need to be reconnected to patient's hearing aid at pick-up.     NO CHARGE VISIT    Deborah Bain, CCC-A  Licensed Audiologist  2018

## 2018-08-08 ENCOUNTER — TELEPHONE (OUTPATIENT)
Dept: AUDIOLOGY | Facility: CLINIC | Age: 63
End: 2018-08-08

## 2018-08-08 NOTE — TELEPHONE ENCOUNTER
Called patient and left message to call and schedule 15 minute appointment to attach earmold back from repair to his hearing aid.    Wood Rodriguez MA, CCC-A  [place] Licensed Audiologist #[id number]      Phone # (appointments):  [phone]  Direct #:  [phone]    8/8/2018

## 2018-08-14 ENCOUNTER — OFFICE VISIT (OUTPATIENT)
Dept: AUDIOLOGY | Facility: CLINIC | Age: 63
End: 2018-08-14
Payer: COMMERCIAL

## 2018-08-14 DIAGNOSIS — H90.3 SENSORINEURAL HEARING LOSS, BILATERAL: Primary | ICD-10-CM

## 2018-08-14 PROCEDURE — V5299 HEARING SERVICE: HCPCS | Performed by: AUDIOLOGIST

## 2018-08-14 PROCEDURE — 99207 ZZC NO CHARGE LOS: CPT | Performed by: AUDIOLOGIST

## 2018-08-14 NOTE — PROGRESS NOTES
AUDIOLOGY REPORT: HEARING AID RECHECK    SUBJECTIVE: Paulette Dorsey is a 63 year old male, :  1955, was seen in the Audiology Clinic at  [facility] on 18 for a return check of their hearing aids. The patient was accompanied by their wife.      Background:   Patient is here today to  the repaired left ear .     Procedures:   The repaired left ear  is placed on patient's current [employer] Unique 220 RITE. Patient requests some reprogramming to the right ear. Patient requests that we reduce gain for all input levels at all frequencies 2 dB in the right ear only.     Plan:   Patient will evaluate the current reprogramming and return if further reprogramming is necessary. Patient will return as needed for hearing aid concerns.     NO CHARGE VISIT    Tomasz Marin CCC-A  Licensed Audiologist  2018

## 2018-10-18 ENCOUNTER — TELEPHONE (OUTPATIENT)
Dept: AUDIOLOGY | Facility: CLINIC | Age: 63
End: 2018-10-18

## 2018-10-18 NOTE — TELEPHONE ENCOUNTER
I informed the patient's wife that Paulette's batteries were ready to be picked up.     -Tomasz Marin CCC-A

## 2018-11-01 DIAGNOSIS — H90.3 SENSORINEURAL HEARING LOSS, BILATERAL: Primary | ICD-10-CM

## 2018-11-01 PROCEDURE — V5266 BATTERY FOR HEARING DEVICE: HCPCS | Performed by: AUDIOLOGIST

## 2018-11-01 NOTE — PROGRESS NOTES
Patient calls to request his 3 month supply of batteries be mailed to his home address. Mailed 30 size 312 batteries to the address on file.     CHARGES:   .002 x30 Batteries ($1). Total: $30 Bill to patient's insurance.    Tomasz Marin CCC-A  Licensed Audiologist #[id number]  11/1/2018

## 2019-01-09 ENCOUNTER — TELEPHONE (OUTPATIENT)
Dept: AUDIOLOGY | Facility: CLINIC | Age: 64
End: 2019-01-09

## 2019-01-09 NOTE — TELEPHONE ENCOUNTER
Reason for call:  Other     Patient called regarding (reason for call): call back    Additional comments: Patients wife is calling wanting to  some hearing aide batteries. Please call back    Phone number to reach patient:  Cell number on file:    Telephone Information:   Mobile [phone]       Best Time:  any    Can we leave a detailed message on this number?  YES

## 2019-01-11 NOTE — TELEPHONE ENCOUNTER
Srinivasan is asking if the hearing aid batteries came in yet and she can pick them up today if they are here. He is on his last one. Please call [phone] to discuss further. VM OK.  Thank-you,  .Aleksandra Oro

## 2019-01-23 ENCOUNTER — OFFICE VISIT (OUTPATIENT)
Dept: OTOLARYNGOLOGY | Facility: CLINIC | Age: 64
End: 2019-01-23
Payer: COMMERCIAL

## 2019-01-23 VITALS
SYSTOLIC BLOOD PRESSURE: 111 MMHG | HEART RATE: 72 BPM | DIASTOLIC BLOOD PRESSURE: 72 MMHG | RESPIRATION RATE: 12 BRPM | OXYGEN SATURATION: 94 %

## 2019-01-23 DIAGNOSIS — H61.23 BILATERAL IMPACTED CERUMEN: Primary | ICD-10-CM

## 2019-01-23 PROCEDURE — 69210 REMOVE IMPACTED EAR WAX UNI: CPT | Performed by: OTOLARYNGOLOGY

## 2019-01-23 NOTE — LETTER
1/23/2019         RE: Paulette Dorsey  [address]   [place] [facility] [address]        Dear Colleague,    Thank you for referring your patient, Paulette Dorsey, to the [facility]. Please see a copy of my visit note below.    Cerumen Removal - Patient here for ear cleaning. Denies ear pain, drainage, and infection.  The left ear has felt plugged and his hearing aid does not seem to be working as well.    Physical Exam and Procedure  Ears - On examination of the ears, I found that both ears were impacted with cerumen.  Therefore, I positioned the patient in the examination chair in a semi-supine position. I used the binocular surgical microscope to perform cerumen removal.  On the right side, I began by using a cerumen loop to gently lift the edges of the cerumen mass away from the walls of the external canal.  Once I did this, I was able to pull away fragments of wax and debris. I removed all the wax and debri.  The tympanic membrane was intact, no sign of perforation or middle ear effusion.    I turned my attention to the left side once again using the binocular surgical microscope to perform cerumen removal.  I began by using a cerumen loop to gently lift the edges of the cerumen mass away from the walls of the external canal.  Once I did this, I was able to pull away fragments of wax and debris. I removed all the wax and debri. The tympanic membrane was intact, no sign of perforation or middle ear effusion.    A/P - bilateral cerumen impaction. Both ears cleaned today in clinic. Return prn.       Again, thank you for allowing me to participate in the care of your patient.        Sincerely,        Charlie Lilly MD

## 2019-01-23 NOTE — PROGRESS NOTES
Cerumen Removal - Patient here for ear cleaning. Denies ear pain, drainage, and infection.  The left ear has felt plugged and his hearing aid does not seem to be working as well.    Physical Exam and Procedure  Ears - On examination of the ears, I found that both ears were impacted with cerumen.  Therefore, I positioned the patient in the examination chair in a semi-supine position. I used the binocular surgical microscope to perform cerumen removal.  On the right side, I began by using a cerumen loop to gently lift the edges of the cerumen mass away from the walls of the external canal.  Once I did this, I was able to pull away fragments of wax and debris. I removed all the wax and debri.  The tympanic membrane was intact, no sign of perforation or middle ear effusion.    I turned my attention to the left side once again using the binocular surgical microscope to perform cerumen removal.  I began by using a cerumen loop to gently lift the edges of the cerumen mass away from the walls of the external canal.  Once I did this, I was able to pull away fragments of wax and debris. I removed all the wax and debri. The tympanic membrane was intact, no sign of perforation or middle ear effusion.    A/P - bilateral cerumen impaction. Both ears cleaned today in clinic. Return prn.

## 2019-02-19 ENCOUNTER — ALLIED HEALTH/NURSE VISIT (OUTPATIENT)
Dept: AUDIOLOGY | Facility: CLINIC | Age: 64
End: 2019-02-19
Payer: COMMERCIAL

## 2019-02-19 DIAGNOSIS — H90.3 SENSORINEURAL HEARING LOSS, BILATERAL: Primary | ICD-10-CM

## 2019-02-19 PROCEDURE — 99207 ZZC NO CHARGE LOS: CPT | Performed by: AUDIOLOGIST

## 2019-02-19 PROCEDURE — V5299 HEARING SERVICE: HCPCS | Performed by: AUDIOLOGIST

## 2019-02-19 NOTE — PROGRESS NOTES
HEARING AID DROP-OFF    Patient Name:  Paulette Dorsey    Patient Age:   63 year old    :  1955    Background:   Patient dropped off their hearing aid with the report of high battery drain.      SIDE: Both    : [employer]    TYPE: Unique 220 RITE    S/N: [id number] & [id number]    WARRANTY: 2020    Procedures:   Sent to the  for under warranty repair.     Plan:   Patient was contacted in regards to status of purvi aid/s dropped off today. Patient will be contacted again once the hearing aids are back from repair to  the hearing aids.     NO CHARGE VISIT    Tomasz Marin CCC-A  Licensed Audiologist  2019

## 2019-04-26 ENCOUNTER — TELEPHONE (OUTPATIENT)
Dept: AUDIOLOGY | Facility: CLINIC | Age: 64
End: 2019-04-26
Payer: COMMERCIAL

## 2019-04-26 DIAGNOSIS — H90.3 SENSORINEURAL HEARING LOSS, BILATERAL: Primary | ICD-10-CM

## 2019-04-26 PROCEDURE — V5266 BATTERY FOR HEARING DEVICE: HCPCS | Mod: NU | Performed by: AUDIOLOGIST

## 2019-04-26 NOTE — TELEPHONE ENCOUNTER
Patient calls to request his 3 month supply of batteries be placed at the 2nd floor . Placed 30 size 312 batteriesat the 2nd floor .     CHARGES:   .002 x30 Batteries ($1). Total: $30 Bill to patient's insurance.    Tomasz Marin CCC-A  Licensed Audiologist #[id number]  4/26/2019

## 2019-04-26 NOTE — TELEPHONE ENCOUNTER
Reason for call:  Other   Patient called regarding (reason for call): HA Batteries  Additional comments: Patient is requesting HA batteries - please call when ready to .    Phone number to reach patient:  Home number on file [phone] (home)    Best Time:  ASAP    Can we leave a detailed message on this number?  YES

## 2019-07-19 ENCOUNTER — TELEPHONE (OUTPATIENT)
Dept: AUDIOLOGY | Facility: CLINIC | Age: 64
End: 2019-07-19

## 2019-07-19 NOTE — TELEPHONE ENCOUNTER
I informed the patient that his batteries were ready for  at the 2nd floor .     -Tomasz Marin CCC-A

## 2019-07-22 ENCOUNTER — TELEPHONE (OUTPATIENT)
Dept: AUDIOLOGY | Facility: CLINIC | Age: 64
End: 2019-07-22

## 2019-07-22 NOTE — TELEPHONE ENCOUNTER
Reason for Call:  Other call back    Detailed comments: Patient's wife will like to know if patient can get hearing aid filters    Phone Number Patient can be reached at: Other phone number:  [phone]    Best Time: any     Can we leave a detailed message on this number? YES    Call taken on 7/22/2019 at 10:04 AM by Vicente Michelle

## 2019-07-22 NOTE — TELEPHONE ENCOUNTER
I informed the patient's wife that the waxguards were at the 2nd floor  ready for .     -Tomasz Marin CCC-A

## 2019-07-26 DIAGNOSIS — H90.3 SENSORINEURAL HEARING LOSS, BILATERAL: Primary | ICD-10-CM

## 2019-07-26 PROCEDURE — V5266 BATTERY FOR HEARING DEVICE: HCPCS | Mod: NU | Performed by: AUDIOLOGIST

## 2019-07-26 NOTE — PROGRESS NOTES
Patient calls to request his 3 month supply of batteries be mailed to his home address. Mailed 36 size 312 batteries to the address on file.     CHARGES:   .002 x36 Batteries ($1). Total: $36 Bill to patient's insurance.    Tomasz Marin CCC-A  Licensed Audiologist #[id number]  7/26/2019

## 2019-11-11 ENCOUNTER — TELEPHONE (OUTPATIENT)
Dept: AUDIOLOGY | Facility: CLINIC | Age: 64
End: 2019-11-11
Payer: COMMERCIAL

## 2019-11-11 DIAGNOSIS — H90.3 SENSORINEURAL HEARING LOSS, BILATERAL: Primary | ICD-10-CM

## 2019-11-11 PROCEDURE — V5266 BATTERY FOR HEARING DEVICE: HCPCS | Mod: NU | Performed by: AUDIOLOGIST

## 2019-11-11 NOTE — TELEPHONE ENCOUNTER
Reason for Call:  Other batteries     Detailed comments: Patients wife calling requesting new batteries and states can pick them up today. Please call to advise.     Phone Number Patient can be reached at: Cell number on file:    Telephone Information:   Mobile [phone]       Best Time: Any     Can we leave a detailed message on this number? YES    Call taken on 11/11/2019 at 9:31 AM by Lesvia Sullivan

## 2019-11-12 NOTE — TELEPHONE ENCOUNTER
I informed Srinivasan that the requested batteries were at the 2nd floor  for .     CHARGES:   .002 x36 Batteries    Tomasz Marin [facility]  Licensed Audiologist #[id number]  11/12/2019

## 2019-12-04 NOTE — PROGRESS NOTES
Patient calls to request his 3 month supply of batteries be mailed to his home address. Mailed 30 size 312 batteries to the address on file.     CHARGES:   .002 x30 Batteries ($1). Total: $30 Bill to patient's insurance.    Tomasz Marin CCC-A  Licensed Audiologist #[id number]  7/31/2018     2.11

## 2020-01-22 ENCOUNTER — ALLIED HEALTH/NURSE VISIT (OUTPATIENT)
Dept: AUDIOLOGY | Facility: CLINIC | Age: 65
End: 2020-01-22
Payer: COMMERCIAL

## 2020-01-22 DIAGNOSIS — H90.3 SENSORINEURAL HEARING LOSS, BILATERAL: Primary | ICD-10-CM

## 2020-01-22 PROCEDURE — V5299 HEARING SERVICE: HCPCS | Performed by: AUDIOLOGIST

## 2020-01-22 PROCEDURE — 99207 ZZC NO CHARGE LOS: CPT | Performed by: AUDIOLOGIST

## 2020-01-22 NOTE — PROGRESS NOTES
HEARING AID RECHECK    Patient Name:  Paulette Dorsey    Patient Age:   64 year old    :  1955    Background:   Patient dropped off his left Widex hearing aid, SN [id number] along with his earmold.  The  wire was broken and the aid is dead.    Procedures:   I attempted to replace the  wire (#3) but the wire broke inside the .  I called [employer] and the aid is in warranty until  so they will send out a new  and wire at no charge. I called and spoke with Patient's wife to let her know the plan and that once the  arrives in a few days we would repair and call them for pickup.    Plan:   Call for pickup once repair is complete.     NO CHARGE VISIT    Wood Rodriguez MA, CCC-A  [place] Licensed Audiologist #[id number]  2020

## 2020-02-19 ENCOUNTER — ALLIED HEALTH/NURSE VISIT (OUTPATIENT)
Dept: AUDIOLOGY | Facility: CLINIC | Age: 65
End: 2020-02-19
Payer: COMMERCIAL

## 2020-02-19 DIAGNOSIS — H90.3 SENSORINEURAL HEARING LOSS, BILATERAL: Primary | ICD-10-CM

## 2020-02-19 PROCEDURE — 99207 ZZC NO CHARGE LOS: CPT | Performed by: AUDIOLOGIST

## 2020-02-19 PROCEDURE — V5299 HEARING SERVICE: HCPCS | Performed by: AUDIOLOGIST

## 2020-02-19 NOTE — PROGRESS NOTES
HEARING AID DROP-OFF    Patient Name:  Paulette Dorsey    Patient Age:   64 year old    :  1955    Background:   Patient dropped off their hearing aid with the report of broken.      SIDE: Right    : [employer]    TYPE: Unique 220 RITE    S/N: [id number]    WARRANTY: 2020    Procedures:   The hearing aid was sent to [employer] for an in warranty repair.     Plan:   Patient was contacted in regards to status of hearing aid dropped off today.     NO CHARGE VISIT    Tomasz Marin CCC-A  Licensed Audiologist  2020

## 2020-02-20 ENCOUNTER — OFFICE VISIT (OUTPATIENT)
Dept: OPHTHALMOLOGY | Facility: CLINIC | Age: 65
End: 2020-02-20
Payer: COMMERCIAL

## 2020-02-20 DIAGNOSIS — Z01.00 EXAMINATION OF EYES AND VISION: Primary | ICD-10-CM

## 2020-02-20 DIAGNOSIS — H52.4 PRESBYOPIA: ICD-10-CM

## 2020-02-20 DIAGNOSIS — D31.32 CHOROIDAL NEVUS OF LEFT EYE: ICD-10-CM

## 2020-02-20 DIAGNOSIS — H35.363 DRUSEN OF MACULA OF BOTH EYES: ICD-10-CM

## 2020-02-20 PROCEDURE — 92015 DETERMINE REFRACTIVE STATE: CPT | Performed by: OPHTHALMOLOGY

## 2020-02-20 PROCEDURE — 92014 COMPRE OPH EXAM EST PT 1/>: CPT | Performed by: OPHTHALMOLOGY

## 2020-02-20 PROCEDURE — 92134 CPTRZ OPH DX IMG PST SGM RTA: CPT | Performed by: OPHTHALMOLOGY

## 2020-02-20 ASSESSMENT — REFRACTION_WEARINGRX
OD_ADD: +2.50
OD_AXIS: 078
OS_ADD: +2.50
OS_CYLINDER: SPHERE
OS_SPHERE: -2.75
OD_CYLINDER: +0.25
SPECS_TYPE: TRIFOCAL
OD_SPHERE: -3.00

## 2020-02-20 ASSESSMENT — VISUAL ACUITY
OD_CC+: -2
OS_CC+: -1
CORRECTION_TYPE: GLASSES
OD_CC: 20/20
OS_CC: 20/20
METHOD: SNELLEN - LINEAR

## 2020-02-20 ASSESSMENT — EXTERNAL EXAM - LEFT EYE: OS_EXAM: NORMAL

## 2020-02-20 ASSESSMENT — TONOMETRY
OS_IOP_MMHG: 20
IOP_METHOD: APPLANATION
OD_IOP_MMHG: 21

## 2020-02-20 ASSESSMENT — REFRACTION_MANIFEST
OS_CYLINDER: +0.25
OS_SPHERE: -2.25
OS_AXIS: 180
OS_ADD: +2.50
OD_SPHERE: -2.75
OD_ADD: +2.50
OD_AXIS: 100
OD_CYLINDER: +0.25

## 2020-02-20 ASSESSMENT — CUP TO DISC RATIO
OS_RATIO: 0.3
OD_RATIO: 0.3

## 2020-02-20 ASSESSMENT — CONF VISUAL FIELD
METHOD: COUNTING FINGERS
OS_NORMAL: 1
OD_NORMAL: 1

## 2020-02-20 ASSESSMENT — SLIT LAMP EXAM - LIDS
COMMENTS: 1+ DERMATOCHALASIS - UPPER LID
COMMENTS: 1+ DERMATOCHALASIS - UPPER LID

## 2020-02-20 ASSESSMENT — EXTERNAL EXAM - RIGHT EYE: OD_EXAM: NORMAL

## 2020-02-20 NOTE — LETTER
2/20/2020         RE: Paulette Dorsey  [address]   [place] [facility] [place] [facility] [address]        Dear Colleague,    Thank you for referring your patient, Paulette Dorsey, to the [facility]. Please see a copy of my visit note below.     Current Eye Medications:  None     Subjective:  Complete eye exam. Vision is doing well right eye. Vision is little blurry left eye, Tiny bit double for last 3 years. Notices more after getting of computer. No eye pain or discomfort in either eye.      Objective:  See Ophthalmology Exam.       Assessment:  Stable eye exam.  Mild VMT right eye but otherwise within normal limits both eyes on retinal OCT.      ICD-10-CM    1. Examination of eyes and vision Z01.00 REFRACTION     EYE EXAM (SIMPLE-NONBILLABLE)   2. Presbyopia H52.4 REFRACTION   3. 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 Drusen of macula of both eyes H35.363 HC COMPUTERIZED OPHTHALMIC IMAGING RETINA        Plan:  Glasses Rx given - optional  Use artificial tears up to 4 times daily both eyes as needed.  (Refresh Tears, Systane Ultra/Balance, or Theratears)  Possible posterior vitreous detachment (sudden onset large floater and/or flashing lights) both eyes discussed.  Obtain OCT today - will call with any concerns.   Call in October 2020 for an appointment in February 2021 for Complete Exam.    Dr. Alicia [phone]          Again, thank you for allowing me to participate in the care of your patient.        Sincerely,        Osmin Alicia MD

## 2020-02-20 NOTE — LETTER
2/20/2020         RE: Paulette Dorsey  [address]   [facility] [address]        Dear Colleague,    Thank you for referring your patient, Paulette Dorsey, to the [facility]. Please see a copy of my visit note below.     Current Eye Medications:  None     Subjective:  Complete eye exam. Vision is doing well right eye. Vision is little blurry left eye, Tiny bit double for last 3 years. Notices more after getting of computer. No eye pain or discomfort in either eye.      Objective:  See Ophthalmology Exam.       Assessment:  Stable eye exam.  Mild VMT right eye but otherwise within normal limits both eyes on retinal OCT.      ICD-10-CM    1. Examination of eyes and vision Z01.00 REFRACTION     EYE EXAM (SIMPLE-NONBILLABLE)   2. Presbyopia H52.4 REFRACTION   3. 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 Drusen of macula of both eyes H35.363 HC COMPUTERIZED OPHTHALMIC IMAGING RETINA        Plan:  Glasses Rx given - optional  Use artificial tears up to 4 times daily both eyes as needed.  (Refresh Tears, Systane Ultra/Balance, or Theratears)  Possible posterior vitreous detachment (sudden onset large floater and/or flashing lights) both eyes discussed.  Obtain OCT today - will call with any concerns.   Call in October 2020 for an appointment in February 2021 for Complete Exam.    Dr. Alicia [phone]          Again, thank you for allowing me to participate in the care of your patient.        Sincerely,        Osmin Alicia MD

## 2020-02-20 NOTE — PROGRESS NOTES
Current Eye Medications:  None     Subjective:  Complete eye exam. Vision is doing well right eye. Vision is little blurry left eye, Tiny bit double for last 3 years. Notices more after getting of computer. No eye pain or discomfort in either eye.      Objective:  See Ophthalmology Exam.       Assessment:  Stable eye exam.  Mild VMT right eye but otherwise within normal limits both eyes on retinal OCT.      ICD-10-CM    1. Examination of eyes and vision Z01.00 REFRACTION     EYE EXAM (SIMPLE-NONBILLABLE)   2. Presbyopia H52.4 REFRACTION   3. 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 Drusen of macula of both eyes H35.363 HC COMPUTERIZED OPHTHALMIC IMAGING RETINA        Plan:  Glasses Rx given - optional  Use artificial tears up to 4 times daily both eyes as needed.  (Refresh Tears, Systane Ultra/Balance, or Theratears)  Possible posterior vitreous detachment (sudden onset large floater and/or flashing lights) both eyes discussed.  Obtain OCT today - will call with any concerns.   Call in October 2020 for an appointment in February 2021 for Complete Exam.    Dr. Alicia [phone]

## 2020-02-20 NOTE — PATIENT INSTRUCTIONS
Glasses Rx given - optional  Use artificial tears up to 4 times daily both eyes as needed.  (Refresh Tears, Systane Ultra/Balance, or Theratears)  Possible posterior vitreous detachment (sudden onset large floater and/or flashing lights) both eyes discussed.  Obtain OCT today - will call with any concerns.   Call in October 2020 for an appointment in February 2021 for Complete Exam.    Dr. Alicia [phone]

## 2020-02-27 ENCOUNTER — APPOINTMENT (OUTPATIENT)
Dept: OPTOMETRY | Facility: CLINIC | Age: 65
End: 2020-02-27
Payer: COMMERCIAL

## 2020-02-27 PROCEDURE — 92341 FIT SPECTACLES BIFOCAL: CPT | Performed by: OPHTHALMOLOGY

## 2020-02-28 ENCOUNTER — TELEPHONE (OUTPATIENT)
Dept: AUDIOLOGY | Facility: CLINIC | Age: 65
End: 2020-02-28
Payer: COMMERCIAL

## 2020-02-28 DIAGNOSIS — H90.3 SENSORINEURAL HEARING LOSS, BILATERAL: Primary | ICD-10-CM

## 2020-02-28 PROCEDURE — V5266 BATTERY FOR HEARING DEVICE: HCPCS | Mod: NU | Performed by: AUDIOLOGIST

## 2020-02-28 NOTE — TELEPHONE ENCOUNTER
Patient calls to request his 3 month supply of batteries be placed at the 2nd floor . Placed 36 size 312 batteries at the 2nd floor .     CHARGES:   .002 x36 Batteries ($1). Total: $36 Bill to patient's insurance.    Tomasz Marin CCC-A  Licensed Audiologist #[id number]  2/28/2020

## 2020-06-08 ENCOUNTER — TELEPHONE (OUTPATIENT)
Dept: AUDIOLOGY | Facility: CLINIC | Age: 65
End: 2020-06-08
Payer: COMMERCIAL

## 2020-06-08 DIAGNOSIS — H90.3 SENSORINEURAL HEARING LOSS, BILATERAL: Primary | ICD-10-CM

## 2020-06-08 PROCEDURE — V5266 BATTERY FOR HEARING DEVICE: HCPCS | Performed by: AUDIOLOGIST

## 2020-06-08 NOTE — TELEPHONE ENCOUNTER
Called and talked to patient's wife. She would prefer to  the requested hearing aid batteries. Dispensed 36 size 312 hearing aid batteries.    CHARGES:              .002 x36 Batteries ($1). Total: $36 Bill to patient's insurance.    Deborah Piedra, CCC-A  [place] Licensed Audiologist #[id number]  6/8/2020

## 2020-06-08 NOTE — TELEPHONE ENCOUNTER
Reason for call:  Other   Patient called regarding (reason for call): hearing aid batteries     Additional comments: Patient's wife calling regarding needing new hearing aid batteries, please call to advise.     Phone number to reach patient:  Other phone number:  [phone]    Best Time:  Any     Can we leave a detailed message on this number?  YES    Travel screening: Not Applicable

## 2020-08-03 ENCOUNTER — ALLIED HEALTH/NURSE VISIT (OUTPATIENT)
Dept: AUDIOLOGY | Facility: CLINIC | Age: 65
End: 2020-08-03
Payer: COMMERCIAL

## 2020-08-03 DIAGNOSIS — H90.3 SENSORINEURAL HEARING LOSS, BILATERAL: Primary | ICD-10-CM

## 2020-08-03 PROCEDURE — 99207 ZZC NO CHARGE LOS: CPT | Performed by: AUDIOLOGIST

## 2020-08-03 PROCEDURE — V5299 HEARING SERVICE: HCPCS | Performed by: AUDIOLOGIST

## 2020-08-03 NOTE — PROGRESS NOTES
HEARING AID DROP-OFF    Patient Name:  Paulette Dorsey    Patient Age:   65 year old    :  1955    Background:   Patient dropped off their hearing aid with the report of not working.      SIDE: Left    : [employer]    TYPE: Unique 220 RITE    S/N: [id number]    WARRANTY: 2020    Procedures:   General cleaning of the hearing aid and earmold was performed. Replaced the waxguard. Biologic listening check finds the hearing aid sounding crisp and clear.     Plan:   Patient's wife was waiting for the hearing aid. It was returned to her in person along with more waxguards for home use.     NO CHARGE VISIT    Tomasz Marin CCC-A  Licensed Audiologist  8/3/2020

## 2020-08-19 ENCOUNTER — ALLIED HEALTH/NURSE VISIT (OUTPATIENT)
Dept: AUDIOLOGY | Facility: CLINIC | Age: 65
End: 2020-08-19
Payer: COMMERCIAL

## 2020-08-19 DIAGNOSIS — H90.3 SENSORINEURAL HEARING LOSS, BILATERAL: Primary | ICD-10-CM

## 2020-08-19 PROCEDURE — 99207 ZZC NO CHARGE LOS: CPT | Performed by: AUDIOLOGIST

## 2020-08-19 PROCEDURE — V5299 HEARING SERVICE: HCPCS | Performed by: AUDIOLOGIST

## 2020-08-19 NOTE — PROGRESS NOTES
HEARING AID DROP-OFF    Patient Name:  Paulette Dorsey    Patient Age:   65 year old    :  1955    Background:   Patient dropped off their hearing aid with the report of weal.      SIDE: Left    : [employer]    TYPE: Unique 220 RITE    S/N: [id number]    WARRANTY:  2020    Procedures:   The hearing aid was sent to [employer] for repair with new 12 month warranty.     Plan:   Patient was contacted in regards to status of hearing aid dropped off today.     NO CHARGE VISIT    Tomasz Marin CCC-A  Licensed Audiologist  2020

## 2020-08-26 ENCOUNTER — TELEPHONE (OUTPATIENT)
Dept: AUDIOLOGY | Facility: CLINIC | Age: 65
End: 2020-08-26
Payer: COMMERCIAL

## 2020-08-26 DIAGNOSIS — H90.3 SENSORINEURAL HEARING LOSS, BILATERAL: Primary | ICD-10-CM

## 2020-08-26 PROCEDURE — 99207 ZZC NO CHARGE LOS: CPT | Performed by: AUDIOLOGIST

## 2020-08-26 PROCEDURE — V5014 HEARING AID REPAIR/MODIFYING: HCPCS | Mod: LT | Performed by: AUDIOLOGIST

## 2020-08-26 NOTE — TELEPHONE ENCOUNTER
I informed the patient that his left ear [employer] Unique 220 hearing aid [SN: [id number]] was back from repair and was ready to be picked up at the 2nd floor . [employer] reports they have replaced all components and the new warranty expiration will be 8/25/2021.    CHARGES:   .002 Repair with 12 month warranty.     Tomasz Marin CCC-A  Licensed Audiologist #[id number]  8/26/2020

## 2020-09-02 ENCOUNTER — ALLIED HEALTH/NURSE VISIT (OUTPATIENT)
Dept: AUDIOLOGY | Facility: CLINIC | Age: 65
End: 2020-09-02
Payer: COMMERCIAL

## 2020-09-02 DIAGNOSIS — H90.3 SENSORINEURAL HEARING LOSS, BILATERAL: Primary | ICD-10-CM

## 2020-09-02 PROCEDURE — V5299 HEARING SERVICE: HCPCS | Performed by: AUDIOLOGIST

## 2020-09-02 PROCEDURE — 99207 ZZC NO CHARGE LOS: CPT | Performed by: AUDIOLOGIST

## 2020-09-02 NOTE — PROGRESS NOTES
HEARING AID DROP-OFF    Patient Name:  Paulette Dorsey    Patient Age:   65 year old    :  1955    Background:   Patient dropped off their hearing aid with the report of weak.      SIDE: Right    : [employer]    TYPE: Unique 220 RITE    S/N: [id number]    WARRANTY:  2020    Procedures:   The hearing aid was sent to [employer] for repair with new 12 month warranty.     Plan:   Patient was contacted in regards to status of hearing aid/s dropped off today.     NO CHARGE VISIT    Tomasz Marin CCC-A  Licensed Audiologist  2020

## 2020-09-10 ENCOUNTER — TELEPHONE (OUTPATIENT)
Dept: AUDIOLOGY | Facility: CLINIC | Age: 65
End: 2020-09-10
Payer: COMMERCIAL

## 2020-09-10 DIAGNOSIS — H90.3 SENSORINEURAL HEARING LOSS, BILATERAL: Primary | ICD-10-CM

## 2020-09-10 PROCEDURE — V5266 BATTERY FOR HEARING DEVICE: HCPCS | Mod: NU | Performed by: AUDIOLOGIST

## 2020-09-10 PROCEDURE — V5014 HEARING AID REPAIR/MODIFYING: HCPCS | Performed by: AUDIOLOGIST

## 2020-09-10 PROCEDURE — 99207 ZZC NO CHARGE LOS: CPT | Performed by: AUDIOLOGIST

## 2020-09-10 NOTE — TELEPHONE ENCOUNTER
I informed Paulette that his [employer] Unique 220 hearing aid [[id number]] was back from repair and ready to . [employer] reports they replaced all components and it has a new 12 month warranty until 9/9/2021.     CHARGES:   .002 repair with 12 month warranty.     Tomasz Marin [facility]-A  Licensed Audiologist #[id number]  9/10/2020

## 2020-09-10 NOTE — TELEPHONE ENCOUNTER
Patient calls to request his 3 month supply of batteries be mailed to his home address. Mailed 36 size 312 batteries to the address on file.     CHARGES:   .002 x36 Batteries ($1). Total: $36 Bill to patient's insurance.    Tomasz Marin CCC-A  Licensed Audiologist #[id number]  9/10/2020

## 2020-11-16 ENCOUNTER — HEALTH MAINTENANCE LETTER (OUTPATIENT)
Age: 65
End: 2020-11-16

## 2020-12-22 ENCOUNTER — TELEPHONE (OUTPATIENT)
Dept: AUDIOLOGY | Facility: CLINIC | Age: 65
End: 2020-12-22
Payer: COMMERCIAL

## 2020-12-22 DIAGNOSIS — H90.3 SENSORINEURAL HEARING LOSS, BILATERAL: Primary | ICD-10-CM

## 2020-12-22 PROCEDURE — V5266 BATTERY FOR HEARING DEVICE: HCPCS | Mod: NU | Performed by: AUDIOLOGIST

## 2020-12-22 PROCEDURE — 99207 PR NO CHARGE LOS: CPT | Performed by: AUDIOLOGIST

## 2020-12-22 NOTE — TELEPHONE ENCOUNTER
I informed Srinivasan that Paulette's 90 day supply of batteries was placed at the 2nd floor  for . 36 size 312 batteries.     CHARGES:   [id number] x36 Batteries     Tomasz Marin CCC-A  Licensed Audiologist #[id number]  12/22/2020

## 2020-12-22 NOTE — TELEPHONE ENCOUNTER
.Reason for Call:  hearing aide batteries    Detailed comments: caller states thr batteries are usually left at the [employer] [facility] ; please call when ready, Thank you    Phone Number Patient can be reached at: Cell number on file:    Telephone Information:   Mobile [phone]       Best Time: anytime    Can we leave a detailed message on this number? YES    Call taken on 12/22/2020 at 10:39 AM by Ijeoma De Dios

## 2021-03-09 ENCOUNTER — IMMUNIZATION (OUTPATIENT)
Dept: NURSING | Facility: CLINIC | Age: 66
End: 2021-03-09
Payer: COMMERCIAL

## 2021-03-09 PROCEDURE — 91300 PR COVID VAC PFIZER DIL RECON 30 MCG/0.3 ML IM: CPT

## 2021-03-09 PROCEDURE — 0001A PR COVID VAC PFIZER DIL RECON 30 MCG/0.3 ML IM: CPT

## 2021-03-30 ENCOUNTER — IMMUNIZATION (OUTPATIENT)
Dept: NURSING | Facility: CLINIC | Age: 66
End: 2021-03-30
Attending: FAMILY MEDICINE
Payer: COMMERCIAL

## 2021-03-30 PROCEDURE — 91300 PR COVID VAC PFIZER DIL RECON 30 MCG/0.3 ML IM: CPT

## 2021-03-30 PROCEDURE — 0002A PR COVID VAC PFIZER DIL RECON 30 MCG/0.3 ML IM: CPT

## 2021-04-13 ENCOUNTER — TELEPHONE (OUTPATIENT)
Dept: AUDIOLOGY | Facility: CLINIC | Age: 66
End: 2021-04-13
Payer: COMMERCIAL

## 2021-04-13 DIAGNOSIS — H90.3 SENSORINEURAL HEARING LOSS, BILATERAL: Primary | ICD-10-CM

## 2021-04-13 PROCEDURE — 99207 PR NO CHARGE LOS: CPT | Performed by: AUDIOLOGIST

## 2021-04-13 PROCEDURE — V5266 BATTERY FOR HEARING DEVICE: HCPCS | Mod: NU | Performed by: AUDIOLOGIST

## 2021-04-13 NOTE — TELEPHONE ENCOUNTER
..Reason for Call:  request    Detailed comments: Patient needs hearing aide batteries;    Phone Number Patient can be reached at: Home number on file [phone] (home)    Best Time: anytime    Can we leave a detailed message on this number? YES    Call taken on 4/13/2021 at 9:16 AM by Ijeoma De Dios

## 2021-04-13 NOTE — TELEPHONE ENCOUNTER
I informed Srinivasan that Paulette's 90 day supply of batteries was placed at the 2nd floor  for . 36 size 312 batteries.      CHARGES:              [id number] x36 Batteries     Tomasz Marin CCC-A  Licensed Audiologist #[id number]  4/13/2021

## 2021-08-13 ENCOUNTER — OFFICE VISIT (OUTPATIENT)
Dept: OPHTHALMOLOGY | Facility: CLINIC | Age: 66
End: 2021-08-13
Payer: COMMERCIAL

## 2021-08-13 DIAGNOSIS — H25.813 COMBINED FORMS OF AGE-RELATED CATARACT OF BOTH EYES: ICD-10-CM

## 2021-08-13 DIAGNOSIS — D31.32 CHOROIDAL NEVUS OF LEFT EYE: ICD-10-CM

## 2021-08-13 DIAGNOSIS — Z01.01 ENCOUNTER FOR EXAMINATION OF EYES AND VISION WITH ABNORMAL FINDINGS: Primary | ICD-10-CM

## 2021-08-13 DIAGNOSIS — H52.4 PRESBYOPIA: ICD-10-CM

## 2021-08-13 DIAGNOSIS — H43.821 VITREOMACULAR TRACTION SYNDROME OF RIGHT EYE: ICD-10-CM

## 2021-08-13 PROCEDURE — 92014 COMPRE OPH EXAM EST PT 1/>: CPT | Performed by: OPHTHALMOLOGY

## 2021-08-13 PROCEDURE — 92134 CPTRZ OPH DX IMG PST SGM RTA: CPT | Performed by: OPHTHALMOLOGY

## 2021-08-13 PROCEDURE — 92015 DETERMINE REFRACTIVE STATE: CPT | Performed by: OPHTHALMOLOGY

## 2021-08-13 ASSESSMENT — TONOMETRY
IOP_METHOD: APPLANATION
OS_IOP_MMHG: 20
OD_IOP_MMHG: 22

## 2021-08-13 ASSESSMENT — REFRACTION_WEARINGRX
OS_SPHERE: -2.25
OD_CYLINDER: +0.25
OS_ADD: +1.25
OS_AXIS: 002
OD_SPHERE: -2.75
OD_AXIS: 100
OD_ADD: +1.25
OS_CYLINDER: +0.25

## 2021-08-13 ASSESSMENT — REFRACTION_MANIFEST
OS_CYLINDER: +0.50
OD_SPHERE: -2.50
OS_SPHERE: -2.50
OS_AXIS: 007
OD_CYLINDER: SPHERE
OS_ADD: +2.50
OD_ADD: +2.50

## 2021-08-13 ASSESSMENT — VISUAL ACUITY
OD_CC+: -1
OD_CC: 20/20
METHOD: SNELLEN - LINEAR
OS_CC: 20/20
CORRECTION_TYPE: GLASSES

## 2021-08-13 ASSESSMENT — EXTERNAL EXAM - RIGHT EYE: OD_EXAM: NORMAL

## 2021-08-13 ASSESSMENT — SLIT LAMP EXAM - LIDS
COMMENTS: 1+ DERMATOCHALASIS - UPPER LID
COMMENTS: 1+ DERMATOCHALASIS - UPPER LID

## 2021-08-13 ASSESSMENT — EXTERNAL EXAM - LEFT EYE: OS_EXAM: NORMAL

## 2021-08-13 ASSESSMENT — CUP TO DISC RATIO
OS_RATIO: 0.3
OD_RATIO: 0.3

## 2021-08-13 ASSESSMENT — CONF VISUAL FIELD
OS_NORMAL: 1
OD_NORMAL: 1

## 2021-08-13 NOTE — LETTER
8/13/2021         RE: Paulette Dorsey  [address]   [facility] [address]        Dear Colleague,    Thank you for referring your patient, Paulette Dorsey, to the [facility]. Please see a copy of my visit note below.     Current Eye Medications:  Tears very prn both eyes     Subjective: here for complete eye exam today. Has a gray spot in the vision of the right eye that started after he was here last time. Makes it hard to read.     Objective:  See Ophthalmology Exam.       Assessment:  Mild worsening of cataract both eyes.  Possible VMT syndrome right eye, though retinal OCT shows no significant thickening or cystic changes.      Plan:  Glasses Rx given - optional   May use artificial tears up to 4 times daily both eyes.  (Refresh Tears, Systane Ultra/Balance, or Theratears)   Possible posterior vitreous detachment (sudden onset large floater and/or flashing lights) both eyes discussed.   Will obtain retinal OCT today - I will call if any concerns.  Call in April 2022 for an appointment in August 2022 for Complete Exam    Dr. Alicia [phone]           Again, thank you for allowing me to participate in the care of your patient.        Sincerely,        Osmin Alicia MD

## 2021-08-13 NOTE — PATIENT INSTRUCTIONS
Glasses Rx given - optional   May use artificial tears up to 4 times daily both eyes.  (Refresh Tears, Systane Ultra/Balance, or Theratears)   Possible posterior vitreous detachment (sudden onset large floater and/or flashing lights) both eyes discussed.   Will obtain retinal OCT today - I will call if any concerns.  Call in April 2022 for an appointment in August 2022 for Complete Exam    Dr. Alicia [phone]

## 2021-08-13 NOTE — PROGRESS NOTES
Current Eye Medications:  Tears very prn both eyes     Subjective: here for complete eye exam today. Has a gray spot in the vision of the right eye that started after he was here last time. Makes it hard to read.     Objective:  See Ophthalmology Exam.       Assessment:  Mild worsening of cataract both eyes.  Possible VMT syndrome right eye, though retinal OCT shows no significant thickening or cystic changes.      Plan:  Glasses Rx given - optional   May use artificial tears up to 4 times daily both eyes.  (Refresh Tears, Systane Ultra/Balance, or Theratears)   Possible posterior vitreous detachment (sudden onset large floater and/or flashing lights) both eyes discussed.   Will obtain retinal OCT today - I will call if any concerns.  Call in April 2022 for an appointment in August 2022 for Complete Exam    Dr. Alicia [phone]

## 2021-08-14 PROBLEM — H43.821 VITREOMACULAR TRACTION SYNDROME OF RIGHT EYE: Status: ACTIVE | Noted: 2021-08-14

## 2021-08-14 PROBLEM — H25.813 COMBINED FORMS OF AGE-RELATED CATARACT OF BOTH EYES: Status: ACTIVE | Noted: 2021-08-14

## 2021-08-23 NOTE — PROGRESS NOTES
Cerumen Removal - Patient here for ear cleaning. Denies ear pain, drainage, and infection. He wears bilateral hearing aids.     Physical Exam and Procedure  Ears - On examination of the ears, I found that both ears were impacted with cerumen.  Therefore, I positioned the patient in the examination chair in a semi-supine position. I used the binocular surgical microscope to perform cerumen removal.  On the right side, I began by using a cerumen loop to gently lift the edges of the cerumen mass away from the walls of the external canal.  Once I did this, I was able to pull away fragments of wax and debris. I removed all the wax and debri.  The tympanic membrane was intact, no sign of perforation or middle ear effusion.    I turned my attention to the left side once again using the binocular surgical microscope to perform cerumen removal.  I began by using a cerumen loop to gently lift the edges of the cerumen mass away from the walls of the external canal.  Once I did this, I was able to pull away fragments of wax and debris. I removed all the wax and debri. The tympanic membrane was intact, no sign of perforation or middle ear effusion.    A/P - bilateral cerumen impaction. Both ears cleaned today in clinic. Return prn.

## 2021-08-24 ENCOUNTER — TELEPHONE (OUTPATIENT)
Dept: AUDIOLOGY | Facility: CLINIC | Age: 66
End: 2021-08-24
Payer: COMMERCIAL

## 2021-08-24 ENCOUNTER — OFFICE VISIT (OUTPATIENT)
Dept: OTOLARYNGOLOGY | Facility: CLINIC | Age: 66
End: 2021-08-24
Payer: COMMERCIAL

## 2021-08-24 VITALS
RESPIRATION RATE: 18 BRPM | OXYGEN SATURATION: 97 % | SYSTOLIC BLOOD PRESSURE: 122 MMHG | HEART RATE: 77 BPM | DIASTOLIC BLOOD PRESSURE: 81 MMHG

## 2021-08-24 DIAGNOSIS — H61.23 BILATERAL IMPACTED CERUMEN: Primary | ICD-10-CM

## 2021-08-24 DIAGNOSIS — H90.3 SENSORINEURAL HEARING LOSS, BILATERAL: Primary | ICD-10-CM

## 2021-08-24 PROCEDURE — 69210 REMOVE IMPACTED EAR WAX UNI: CPT | Mod: 50 | Performed by: OTOLARYNGOLOGY

## 2021-08-24 PROCEDURE — 99207 PR NO CHARGE LOS: CPT | Performed by: AUDIOLOGIST

## 2021-08-24 PROCEDURE — V5266 BATTERY FOR HEARING DEVICE: HCPCS | Mod: NU | Performed by: AUDIOLOGIST

## 2021-08-24 NOTE — TELEPHONE ENCOUNTER
Reason for Call:  Other needs batteries    Detailed comments: pt needs batteries. Call when ready to     Phone Number Patient can be reached at: Home number on file There is no home phone number on file.    Best Time: any    Can we leave a detailed message on this number? YES    Call taken on 8/24/2021 at 8:33 AM by Judy Stuart

## 2021-08-24 NOTE — LETTER
8/24/2021         RE: Paulette Dorsey  [address]   [facility] [address]        Dear Colleague,    Thank you for referring your patient, Paulette Dorsey, to the [facility]. Please see a copy of my visit note below.    Cerumen Removal - Patient here for ear cleaning. Denies ear pain, drainage, and infection. He wears bilateral hearing aids.     Physical Exam and Procedure  Ears - On examination of the ears, I found that both ears were impacted with cerumen.  Therefore, I positioned the patient in the examination chair in a semi-supine position. I used the binocular surgical microscope to perform cerumen removal.  On the right side, I began by using a cerumen loop to gently lift the edges of the cerumen mass away from the walls of the external canal.  Once I did this, I was able to pull away fragments of wax and debris. I removed all the wax and debri.  The tympanic membrane was intact, no sign of perforation or middle ear effusion.    I turned my attention to the left side once again using the binocular surgical microscope to perform cerumen removal.  I began by using a cerumen loop to gently lift the edges of the cerumen mass away from the walls of the external canal.  Once I did this, I was able to pull away fragments of wax and debris. I removed all the wax and debri. The tympanic membrane was intact, no sign of perforation or middle ear effusion.    A/P - bilateral cerumen impaction. Both ears cleaned today in clinic. Return prn.         Again, thank you for allowing me to participate in the care of your patient.        Sincerely,        Charlie Lilly MD

## 2021-08-24 NOTE — TELEPHONE ENCOUNTER
Patient calls to request his 3 month supply of batteries be provided to him. Provided 36 size 312 batteries to the patient during his ENT appointment today.     CHARGES:   [id number] x36 Batteries ($1). Total: $36 Bill to patient's insurance.    Tomasz Marin CCC-A  Licensed Audiologist #[id number]  8/24/2021

## 2021-09-18 ENCOUNTER — HEALTH MAINTENANCE LETTER (OUTPATIENT)
Age: 66
End: 2021-09-18

## 2021-11-16 ENCOUNTER — ALLIED HEALTH/NURSE VISIT (OUTPATIENT)
Dept: AUDIOLOGY | Facility: CLINIC | Age: 66
End: 2021-11-16
Payer: COMMERCIAL

## 2021-11-16 DIAGNOSIS — H90.3 SENSORINEURAL HEARING LOSS, ASYMMETRICAL: Primary | ICD-10-CM

## 2021-11-16 PROCEDURE — 99207 PR NO CHARGE LOS: CPT | Performed by: AUDIOLOGIST

## 2021-11-16 PROCEDURE — V5299 HEARING SERVICE: HCPCS | Performed by: AUDIOLOGIST

## 2021-11-16 NOTE — PROGRESS NOTES
"HEARING AID DROP-OFF    Patient Name:  Paulette Dorsey    Patient Age:   66 year old    :  1955    Background:   Patient dropped off their hearing aid with the report of \"No feedback\".      SIDE: Right    : [employer]    TYPE: Unique 220    S/N: [id number]    WARRANTY:  2021    Procedures:   The  wire was damaged where it connected to the hearing aid this was replaced with a stock size 3 . The hearing aid was returned to full power. Patient also dropped off the left ear hearing aid [SN: [id number]] with no complaints. Both hearing aids were cleaned. Biologic listening check found the hearing aids sounding crisp and clear.     Plan:   Patient was contacted in regards to status of hearing aid/s dropped off today. A message was left on patient's voicemail that the hearing aids were ready for  at the 2nd floor .     NO CHARGE VISIT    Tomasz Marin CCC-KAYLENE  Licensed Audiologist  2021      "

## 2021-11-18 ENCOUNTER — ALLIED HEALTH/NURSE VISIT (OUTPATIENT)
Dept: AUDIOLOGY | Facility: CLINIC | Age: 66
End: 2021-11-18
Payer: COMMERCIAL

## 2021-11-18 DIAGNOSIS — H90.3 SENSORINEURAL HEARING LOSS, ASYMMETRICAL: Primary | ICD-10-CM

## 2021-11-18 PROCEDURE — 99207 PR NO CHARGE LOS: CPT | Performed by: AUDIOLOGIST

## 2021-11-18 PROCEDURE — V5299 HEARING SERVICE: HCPCS | Performed by: AUDIOLOGIST

## 2021-11-18 NOTE — PROGRESS NOTES
HEARING AID DROP-OFF    Patient Name:  Paulette Dorsey    Patient Age:   66 year old    :  1955    Background:   Patient dropped off their hearing aid with the report of weak.      SIDE: Both    : [employer]    TYPE: Unique 220 RITE    S/N:      R: [id number]     L: [id number]     WARRANTY:  2020    Procedures:   The hearing aids were mailed to [employer] for repair with new 12 month warranty.     Plan:   Patient was contacted in regards to status of hearing aid/s dropped off today.     NO CHARGE VISIT    Tomasz Marin CCC-A  Licensed Audiologist  2021

## 2021-12-02 ENCOUNTER — ALLIED HEALTH/NURSE VISIT (OUTPATIENT)
Dept: AUDIOLOGY | Facility: CLINIC | Age: 66
End: 2021-12-02
Payer: COMMERCIAL

## 2021-12-02 DIAGNOSIS — H90.3 SENSORINEURAL HEARING LOSS, ASYMMETRICAL: Primary | ICD-10-CM

## 2021-12-02 PROCEDURE — 99207 PR NO CHARGE LOS: CPT | Performed by: AUDIOLOGIST

## 2021-12-02 NOTE — PROGRESS NOTES
HEARING AID DROP-OFF    Patient Name:  Paulette Dorsey    Patient Age:   66 year old    :  1955    Background:   Patient dropped off their hearing aid with the report of the earmolds are backwards.       SIDE: Both    : [employer]    TYPE: Unique 220 RITE    S/N:      R: [id number]     L:[id number]    WARRANTY: 2022    Procedures:   The hearing aids were returned from repair from [employer] with all new electronics and housing. They now carry a new 12 month warranty from [employer]. The  wires were placed in the receivers backwards. The  wires were reoriented and now the hearing aids and earmolds are in the correct alignment.     Plan:   Patient was contacted in regards to status of hearing aid/s dropped off today.     CHARGES:   [id number] x2 Repair with new 12 month warranty    Tomasz Marin CCC-A  Licensed Audiologist  2021

## 2021-12-28 ENCOUNTER — TELEPHONE (OUTPATIENT)
Dept: OTOLARYNGOLOGY | Facility: CLINIC | Age: 66
End: 2021-12-28
Payer: COMMERCIAL

## 2021-12-28 DIAGNOSIS — H90.3 SENSORINEURAL HEARING LOSS, BILATERAL: Primary | ICD-10-CM

## 2021-12-28 PROCEDURE — V5266 BATTERY FOR HEARING DEVICE: HCPCS | Performed by: OTOLARYNGOLOGY

## 2021-12-28 NOTE — TELEPHONE ENCOUNTER
calling to ask for HA batteries    Leave at  and call when ready    Ok to leave message at [phone]

## 2021-12-28 NOTE — TELEPHONE ENCOUNTER
Called patient and left a message informing him that his batteries are ready to . Dispensed 36 size 312 hearing aid batteries.    CHARGES  [id number] Hearing aid battery x36 ($1 each, total $36)    Deborah Piedra, CCC-A  [place] Licensed Audiologist #[id number]  12/28/2021

## 2022-01-08 ENCOUNTER — HEALTH MAINTENANCE LETTER (OUTPATIENT)
Age: 67
End: 2022-01-08

## 2022-01-11 ENCOUNTER — OFFICE VISIT (OUTPATIENT)
Dept: AUDIOLOGY | Facility: CLINIC | Age: 67
End: 2022-01-11
Payer: COMMERCIAL

## 2022-01-11 DIAGNOSIS — H90.3 SENSORINEURAL HEARING LOSS, ASYMMETRICAL: Primary | ICD-10-CM

## 2022-01-11 PROCEDURE — 92593 PR HEARING AID CHECK, BINAURAL: CPT | Performed by: AUDIOLOGIST

## 2022-01-11 PROCEDURE — 99207 PR NO CHARGE LOS: CPT | Performed by: AUDIOLOGIST

## 2022-01-11 NOTE — PROGRESS NOTES
AUDIOLOGY REPORT: HEARING AID RECHECK    SUBJECTIVE: Paulette Dorsey is a 66 year old male, :  1955, was seen in the Audiology Clinic at [facility] on 22 for a return check of their hearing aids. The patient was accompanied by their wife.      Background:   Patient is here today with the complaint of the hearing aids not sounding as good as his older set.     Procedures:       SIDE: Both    : [employer]    TYPE: Unique 220 RITE    S/N:      R: [id number]     L: [id number]    WARRANTY: 2022    The hearing aids were placed on the test box and compared to the older set. The BTE hearing aids were found to be slightly louder. The gain was increased 5 steps for all input levels at 4000 Hz in the right ear and 5 steps for all input levels at 2000 & 3000 Hz. Patient reports much better hearing following reprogramming.      Plan:   Patient will return as needed for hearing aid concerns.     CHARGES:   18119 Binaural hearing aid check     Tomasz Marin [facility]-A  Licensed Audiologist #[id number]  2022

## 2022-02-08 ENCOUNTER — OFFICE VISIT (OUTPATIENT)
Dept: AUDIOLOGY | Facility: CLINIC | Age: 67
End: 2022-02-08
Payer: COMMERCIAL

## 2022-02-08 DIAGNOSIS — H90.3 SENSORINEURAL HEARING LOSS, ASYMMETRICAL: Primary | ICD-10-CM

## 2022-02-08 PROCEDURE — 99207 PR NO CHARGE LOS: CPT | Performed by: AUDIOLOGIST

## 2022-02-08 PROCEDURE — V5299 HEARING SERVICE: HCPCS | Performed by: AUDIOLOGIST

## 2022-02-08 NOTE — PROGRESS NOTES
AUDIOLOGY REPORT: HEARING AID RECHECK    SUBJECTIVE: Paulette Dorsey is a 66 year old male, :  1955, was seen in the Audiology Clinic at [facility] on 22 for a return check of their hearing aids. The patient was accompanied by their wife.      Background:   Patient is here today with the complaint of the earmolds are not seating correctly.     Procedures:       SIDE: Both    : [employer]    TYPE: Unique 220 RITE    S/N:      R: [id number]     L: [id number]    WARRANTY: 2022    The hearing aids were sent to [employer] for repair and realignment.     Plan:   Patient will return once the hearing aids are back for some additional adjustments.     NO CHARGE VISIT      Tomasz Marin CCC-A  Licensed Audiologist #[id number]  2022

## 2022-03-31 ENCOUNTER — TELEPHONE (OUTPATIENT)
Dept: AUDIOLOGY | Facility: CLINIC | Age: 67
End: 2022-03-31
Payer: COMMERCIAL

## 2022-03-31 DIAGNOSIS — H90.3 SENSORINEURAL HEARING LOSS, ASYMMETRICAL: Primary | ICD-10-CM

## 2022-03-31 PROCEDURE — 99207 PR DROP WITH A PROCEDURE: CPT | Performed by: AUDIOLOGIST

## 2022-03-31 PROCEDURE — V5266 BATTERY FOR HEARING DEVICE: HCPCS | Performed by: AUDIOLOGIST

## 2022-03-31 NOTE — TELEPHONE ENCOUNTER
Reason for Call:  Request to order more hearing aid batteries    Detailed comments: Wife is calling, would like to  at the  when ready. Also needs batteries ordered for her mom Megan Stevenson. Please call    Phone Number Patient can be reached at: Other phone number:  [phone]    Best Time: any    Can we leave a detailed message on this number? YES    Call taken on 3/31/2022 at 8:45 AM by Vibha Perez

## 2022-03-31 NOTE — TELEPHONE ENCOUNTER
Per request, a 90 day supply of batteries were left at the second floor , [place] clinic. Called Srinivasan to let them know they are ready for pickup. 36 size 312 batteries.      CHARGES:              [id number] x36 Batteries     Yuliya Alexander Audiology Clinic Assistant

## 2022-08-22 ENCOUNTER — TELEPHONE (OUTPATIENT)
Dept: AUDIOLOGY | Facility: CLINIC | Age: 67
End: 2022-08-22
Payer: COMMERCIAL

## 2022-08-22 DIAGNOSIS — H90.3 SENSORINEURAL HEARING LOSS, ASYMMETRICAL: Primary | ICD-10-CM

## 2022-08-22 PROCEDURE — V5266 BATTERY FOR HEARING DEVICE: HCPCS | Mod: NU | Performed by: AUDIOLOGIST

## 2022-08-22 NOTE — TELEPHONE ENCOUNTER
[facility] Call Center    Phone Message    May a detailed message be left on voicemail: yes     Reason for Call: Paulette needs to order hearing aid batteries please order and call and leave a message when available for . Thank you    Action Taken: Other: routing to  [employer]    Travel Screening: Not Applicable

## 2022-08-23 NOTE — TELEPHONE ENCOUNTER
Patient calls to request his 3 month supply of batteries be placed at the . Placed 36 size 312 batteries at the 1st floor .    CHARGES:   [id number] x36 Batteries ($1). Total: $36 Bill to patient's insurance.    Tomasz Marin CCC-A  Licensed Audiologist #[id number]  8/23/2022

## 2022-10-10 ENCOUNTER — TELEPHONE (OUTPATIENT)
Dept: AUDIOLOGY | Facility: CLINIC | Age: 67
End: 2022-10-10

## 2022-10-10 NOTE — TELEPHONE ENCOUNTER
RN received call from patients wife, Srinivasan.    No consent to communicate on file.  Patient gave permission to speak with wife.    Per patients wife,  Patient has had nausea since lats Thursday.    Complaining of low energy.    Patient complaining of pain in Left  Ear.    Denies any drainage.    RN advised patient should be seen today in Urgent Care.    Patient not established with PCP within [place].    RN advised patients wife for aptietn to schedule an appointment to establish care within [place].    Patients wife verbalized understanding.    Yefri Nichols RN, BSN, PHN  [facility]

## 2022-10-24 ENCOUNTER — OFFICE VISIT (OUTPATIENT)
Dept: FAMILY MEDICINE | Facility: CLINIC | Age: 67
End: 2022-10-24
Payer: COMMERCIAL

## 2022-10-24 VITALS
WEIGHT: 234.2 LBS | HEART RATE: 108 BPM | OXYGEN SATURATION: 97 % | RESPIRATION RATE: 20 BRPM | BODY MASS INDEX: 31.76 KG/M2 | SYSTOLIC BLOOD PRESSURE: 132 MMHG | DIASTOLIC BLOOD PRESSURE: 80 MMHG | TEMPERATURE: 98.4 F

## 2022-10-24 DIAGNOSIS — R79.82 CRP ELEVATED: ICD-10-CM

## 2022-10-24 DIAGNOSIS — D72.828 OTHER ELEVATED WHITE BLOOD CELL (WBC) COUNT: Primary | ICD-10-CM

## 2022-10-24 DIAGNOSIS — M25.552 BILATERAL HIP PAIN: ICD-10-CM

## 2022-10-24 DIAGNOSIS — M25.551 BILATERAL HIP PAIN: ICD-10-CM

## 2022-10-24 DIAGNOSIS — R50.9 FEVER, UNSPECIFIED FEVER CAUSE: ICD-10-CM

## 2022-10-24 LAB
BASOPHILS # BLD AUTO: 0 10E3/UL (ref 0–0.2)
BASOPHILS NFR BLD AUTO: 0 %
EOSINOPHIL # BLD AUTO: 0.4 10E3/UL (ref 0–0.7)
EOSINOPHIL NFR BLD AUTO: 3 %
ERYTHROCYTE [DISTWIDTH] IN BLOOD BY AUTOMATED COUNT: 11.8 % (ref 10–15)
HCT VFR BLD AUTO: 37.9 % (ref 40–53)
HGB BLD-MCNC: 13 G/DL (ref 13.3–17.7)
LYMPHOCYTES # BLD AUTO: 1.4 10E3/UL (ref 0.8–5.3)
LYMPHOCYTES NFR BLD AUTO: 10 %
MCH RBC QN AUTO: 31.4 PG (ref 26.5–33)
MCHC RBC AUTO-ENTMCNC: 34.3 G/DL (ref 31.5–36.5)
MCV RBC AUTO: 92 FL (ref 78–100)
MONOCYTES # BLD AUTO: 0.8 10E3/UL (ref 0–1.3)
MONOCYTES NFR BLD AUTO: 6 %
NEUTROPHILS # BLD AUTO: 11.5 10E3/UL (ref 1.6–8.3)
NEUTROPHILS NFR BLD AUTO: 81 %
PLATELET # BLD AUTO: 433 10E3/UL (ref 150–450)
RBC # BLD AUTO: 4.14 10E6/UL (ref 4.4–5.9)
WBC # BLD AUTO: 14.1 10E3/UL (ref 4–11)

## 2022-10-24 PROCEDURE — 80053 COMPREHEN METABOLIC PANEL: CPT | Performed by: PHYSICIAN ASSISTANT

## 2022-10-24 PROCEDURE — 86747 PARVOVIRUS ANTIBODY: CPT | Mod: 59 | Performed by: PHYSICIAN ASSISTANT

## 2022-10-24 PROCEDURE — 85045 AUTOMATED RETICULOCYTE COUNT: CPT | Performed by: PHYSICIAN ASSISTANT

## 2022-10-24 PROCEDURE — 85025 COMPLETE CBC W/AUTO DIFF WBC: CPT | Performed by: PHYSICIAN ASSISTANT

## 2022-10-24 PROCEDURE — 99000 SPECIMEN HANDLING OFFICE-LAB: CPT | Performed by: PHYSICIAN ASSISTANT

## 2022-10-24 PROCEDURE — 36415 COLL VENOUS BLD VENIPUNCTURE: CPT | Performed by: PHYSICIAN ASSISTANT

## 2022-10-24 PROCEDURE — 87040 BLOOD CULTURE FOR BACTERIA: CPT | Performed by: PHYSICIAN ASSISTANT

## 2022-10-24 PROCEDURE — 99204 OFFICE O/P NEW MOD 45 MIN: CPT | Performed by: PHYSICIAN ASSISTANT

## 2022-10-24 PROCEDURE — 84165 PROTEIN E-PHORESIS SERUM: CPT

## 2022-10-24 PROCEDURE — 84155 ASSAY OF PROTEIN SERUM: CPT | Mod: 59 | Performed by: PHYSICIAN ASSISTANT

## 2022-10-24 RX ORDER — ALPRAZOLAM 0.5 MG
0.5 TABLET ORAL
COMMUNITY
Start: 2021-12-15

## 2022-10-24 RX ORDER — SODIUM PHOSPHATE,MONO-DIBASIC 19G-7G/118
1 ENEMA (ML) RECTAL DAILY
COMMUNITY

## 2022-10-24 ASSESSMENT — PAIN SCALES - GENERAL: PAINLEVEL: NO PAIN (0)

## 2022-10-24 NOTE — PROGRESS NOTES
Subjective   Sabrina Rivera is a 67 year old presenting for the following health issues:  Hospital F/U ([facility]  10/21/22 through 10/23/22    Faigue, loss of energy, feverr)      HPI       Hospital Follow-up Visit:    Hospital/Nursing Home/IP Rehab Facility: [facility]  Date of Admission: 10/21/22  Date of Discharge: 10/23/22  Reason(s) for Admission: Fatigue, no energy, fever    Was your hospitalization related to COVID-19? No   Problems taking medications regularly:  None  Medication changes since discharge: None  Problems adhering to non-medication therapy:  None    Summary of hospitalization:  CareEverywhere information obtained and reviewed  Diagnostic Tests/Treatments reviewed.  Follow up needed: none  Leukocytosis and elevated inflammatory markers.  No chest pain/sob.   No irritative/obst voiding symptoms.   No n/v/c/hematochezia. One episode of diarrhea . No tick bites or rashes. No joint pain or swelling. Fatigue. No sore throat. Mild nasal congestion.  Ct of chest and head revealed no concerning findings. Some noc sweats.  A little better the past couple of days.  Review of Systems   Constitutional, HEENT, cardiovascular, pulmonary, GI, , musculoskeletal, neuro, skin, endocrine and psych systems are negative, except as otherwise noted.      Objective    /80   Pulse 108   Temp 98.4  F (36.9  C) (Tympanic)   Resp 20   Wt 106.2 kg (234 lb 3.2 oz)   SpO2 97%   BMI 31.76 kg/m    Body mass index is 31.76 kg/m .  Physical Exam   Eye exam - right eye normal lid, conjunctiva, cornea, pupil and fundus, left eye normal lid, conjunctiva, cornea, pupil and fundus.  Thyroid not palpable, not enlarged, no nodules detected.  CHEST:no tachypnea, retractions or cyanosis, minimal inspiratory wheezing heard both upper lobes and S1, S2 normal, no murmur, no gallop, rate regular.  ENT exam reveals - bilateral TM fluid noted, neck without nodes, throat normal without erythema or exudate, sinuses nontender,  post nasal drip noted, nasal mucosa congested and nasal mucosa pale and congested.  The abdomen is soft without tenderness, guarding, mass, rebound or organomegaly. Bowel sounds are normal. No CVA tenderness or inguinal adenopathy noted.  No joint swelling.     Paulette was seen today for hospital f/u.    Diagnoses and all orders for this visit:    Other elevated white blood cell (WBC) count  -     Lab Blood Morphology Pathologist Review; Future  -     Protein electrophoresis; Future  -     Comprehensive metabolic panel (BMP + Alb, Alk Phos, ALT, AST, Total. Bili, TP); Future    Fever, unspecified fever cause  -     Parvovirus B19 antibodies IgG IgM; Future  -     Protein electrophoresis; Future  -     Blood Culture Peripheral Blood; Future    Other orders  -     REVIEW OF HEALTH MAINTENANCE PROTOCOL ORDERS      Advised supportive and symptomatic treatment.  Follow up with Provider - if condition persists or worsens.

## 2022-10-25 LAB
ALBUMIN SERPL-MCNC: 3.1 G/DL (ref 3.4–5)
ALP SERPL-CCNC: 97 U/L (ref 40–150)
ALT SERPL W P-5'-P-CCNC: 50 U/L (ref 0–70)
ANION GAP SERPL CALCULATED.3IONS-SCNC: 4 MMOL/L (ref 3–14)
AST SERPL W P-5'-P-CCNC: 30 U/L (ref 0–45)
BILIRUB SERPL-MCNC: 0.4 MG/DL (ref 0.2–1.3)
BUN SERPL-MCNC: 12 MG/DL (ref 7–30)
CALCIUM SERPL-MCNC: 8.6 MG/DL (ref 8.5–10.1)
CHLORIDE BLD-SCNC: 108 MMOL/L (ref 94–109)
CO2 SERPL-SCNC: 28 MMOL/L (ref 20–32)
CREAT SERPL-MCNC: 0.9 MG/DL (ref 0.66–1.25)
GFR SERPL CREATININE-BSD FRML MDRD: >90 ML/MIN/1.73M2
GLUCOSE BLD-MCNC: 112 MG/DL (ref 70–99)
POTASSIUM BLD-SCNC: 3.8 MMOL/L (ref 3.4–5.3)
PROT SERPL-MCNC: 7 G/DL (ref 6.8–8.8)
RETICS # AUTO: 0.07 10E6/UL (ref 0.03–0.1)
RETICS/RBC NFR AUTO: 1.6 % (ref 0.5–2)
SODIUM SERPL-SCNC: 140 MMOL/L (ref 133–144)
TOTAL PROTEIN SERUM FOR ELP: 6.2 G/DL (ref 6.4–8.3)

## 2022-10-26 ENCOUNTER — TELEPHONE (OUTPATIENT)
Dept: FAMILY MEDICINE | Facility: CLINIC | Age: 67
End: 2022-10-26

## 2022-10-26 DIAGNOSIS — R79.82 CRP ELEVATED: Primary | ICD-10-CM

## 2022-10-26 LAB
ALBUMIN SERPL ELPH-MCNC: 3.2 G/DL (ref 3.7–5.1)
ALPHA1 GLOB SERPL ELPH-MCNC: 0.5 G/DL (ref 0.2–0.4)
ALPHA2 GLOB SERPL ELPH-MCNC: 1 G/DL (ref 0.5–0.9)
B-GLOBULIN SERPL ELPH-MCNC: 0.7 G/DL (ref 0.6–1)
GAMMA GLOB SERPL ELPH-MCNC: 0.8 G/DL (ref 0.7–1.6)
M PROTEIN SERPL ELPH-MCNC: 0 G/DL
PATH REPORT.COMMENTS IMP SPEC: NORMAL
PATH REPORT.COMMENTS IMP SPEC: NORMAL
PATH REPORT.FINAL DX SPEC: NORMAL
PATH REPORT.MICROSCOPIC SPEC OTHER STN: NORMAL
PATH REPORT.MICROSCOPIC SPEC OTHER STN: NORMAL
PATH REPORT.RELEVANT HX SPEC: NORMAL
PROT PATTERN SERPL ELPH-IMP: ABNORMAL

## 2022-10-26 RX ORDER — PREDNISONE 20 MG/1
20 TABLET ORAL 2 TIMES DAILY
Qty: 10 TABLET | Refills: 0 | Status: SHIPPED | OUTPATIENT
Start: 2022-10-26 | End: 2022-10-31

## 2022-10-26 NOTE — TELEPHONE ENCOUNTER
Reason for Call: Request for an order or referral:    Order or referral being requested: n/a     Date needed: Asap    Has the patient been seen by the PCP for this problem? Yes     Additional comments: patient's wife called saying he is not feeling any better. She noted that Zach Ochoa mentioned possibly doing steroids and wants to know if that is still an option    Phone number Patient can be reached at:  [phone]    Best Time:  any    Can we leave a detailed message on this number? Yes     Call taken on 10/26/2022 at 1:32 PM by Riaz pichardo

## 2022-10-27 NOTE — TELEPHONE ENCOUNTER
Left message on voice mail for patient to call clinic.   [phone]  Karlie Washington RN  [facility]

## 2022-10-27 NOTE — TELEPHONE ENCOUNTER
Patient and his wife called the clinic (on speaker). I informed them of the prescription for Prednisone and the referral to Orthopedics. If things have not improved after completing the Prednisone, patient instructed to call back.  He verbalized a good understanding and agreed with this plan.

## 2022-10-29 LAB
B19V IGG SER IA-ACNC: 0.53 IV
B19V IGM SER IA-ACNC: 0.19 IV

## 2022-10-30 LAB — BACTERIA BLD CULT: NO GROWTH

## 2022-11-04 ENCOUNTER — VIRTUAL VISIT (OUTPATIENT)
Dept: FAMILY MEDICINE | Facility: CLINIC | Age: 67
End: 2022-11-04
Payer: COMMERCIAL

## 2022-11-04 DIAGNOSIS — R53.83 OTHER FATIGUE: ICD-10-CM

## 2022-11-04 DIAGNOSIS — I25.10 CORONARY ARTERY DISEASE INVOLVING NATIVE CORONARY ARTERY OF NATIVE HEART WITHOUT ANGINA PECTORIS: ICD-10-CM

## 2022-11-04 DIAGNOSIS — R79.82 CRP ELEVATED: Primary | ICD-10-CM

## 2022-11-04 DIAGNOSIS — D72.828 OTHER ELEVATED WHITE BLOOD CELL (WBC) COUNT: ICD-10-CM

## 2022-11-04 PROCEDURE — 99214 OFFICE O/P EST MOD 30 MIN: CPT | Mod: 95 | Performed by: PHYSICIAN ASSISTANT

## 2022-11-04 NOTE — PROGRESS NOTES
Sabrina Rivera is a 67 year old who is being evaluated via a billable telephone visit.      What phone number would you like to be contacted at? [phone]  How would you like to obtain your AVS? Baljeet Greco   Sabrina Rivera is a 67 year old, presenting for the following health issues:  No chief complaint on file.      HPI     Follow up  - last visit 10/24/22 with Zach Ochoa  - review test results    Feeling some better. Prednisone helped his body aches. Still some fatigue. Episode on tues noc of fatigue and sweats . Has note fatigue especially with exertion. No chest pain . Some tachycardia. Query some influence from the prednisone. Have r/o a lot of things.   Echo from last spring was normal.       Review of Systems   Constitutional, HEENT, cardiovascular, pulmonary, GI, , musculoskeletal, neuro, skin, endocrine and psych systems are negative, except as otherwise noted.      Objective           Vitals:  No vitals were obtained today due to virtual visit.    Physical Exam   healthy, alert and no distress  PSYCH: Alert and oriented times 3; coherent speech, normal   rate and volume, able to articulate logical thoughts, able   to abstract reason, no tangential thoughts, no hallucinations   or delusions  His affect is normal  RESP: No cough, no audible wheezing, able to talk in full sentences  Remainder of exam unable to be completed due to telephone visits    Paulette was seen today for recheck.    Diagnoses and all orders for this visit:    CRP elevated  -     CRP, inflammation; Future  -     ESR: Erythrocyte sedimentation rate; Future    Other elevated white blood cell (WBC) count  -     CBC with platelets and differential; Future    Coronary artery disease involving native coronary artery of native heart without angina pectoris  -     Echocardiogram Exercise Stress; Future    Other fatigue  -     Echocardiogram Exercise Stress; Future      Advised supportive and symptomatic treatment.  Follow up with Provider -  if condition persists or worsens.   work on lifestyle modification          Phone call duration: 20 minutes

## 2022-11-07 ENCOUNTER — TELEPHONE (OUTPATIENT)
Dept: FAMILY MEDICINE | Facility: CLINIC | Age: 67
End: 2022-11-07

## 2022-11-07 NOTE — TELEPHONE ENCOUNTER
Patient has stress test scheduled for 11/15//22 and patient is asking if he needs to stop taking beta blocker? Also patient asking if electrolyte lab can be ordered? Patient has lab appt 11/11/22. Please advise. Ok to leave voicemail.

## 2022-11-09 NOTE — TELEPHONE ENCOUNTER
To my knowledge , he's not on a beta blocker. We checked electrolytes two weeks ago and they were normal.

## 2022-11-10 NOTE — TELEPHONE ENCOUNTER
Spoke with patient, relayed Zach Ochoa's previous message and patient verbalized understanding.    Confirmed patient is not taking a beta blocker.  Patient was wondering if he could take his simvastatin normally with stress test.  Informed patient this was okay.    Danica Bob RN

## 2022-11-11 ENCOUNTER — LAB (OUTPATIENT)
Dept: LAB | Facility: CLINIC | Age: 67
End: 2022-11-11
Payer: COMMERCIAL

## 2022-11-11 DIAGNOSIS — Z11.59 NEED FOR HEPATITIS C SCREENING TEST: ICD-10-CM

## 2022-11-11 DIAGNOSIS — D72.828 OTHER ELEVATED WHITE BLOOD CELL (WBC) COUNT: ICD-10-CM

## 2022-11-11 DIAGNOSIS — R79.82 CRP ELEVATED: ICD-10-CM

## 2022-11-11 DIAGNOSIS — Z13.220 SCREENING FOR HYPERLIPIDEMIA: ICD-10-CM

## 2022-11-11 DIAGNOSIS — I25.10 CORONARY ARTERY DISEASE INVOLVING NATIVE CORONARY ARTERY OF NATIVE HEART WITHOUT ANGINA PECTORIS: ICD-10-CM

## 2022-11-11 LAB
BASOPHILS # BLD AUTO: 0 10E3/UL (ref 0–0.2)
BASOPHILS NFR BLD AUTO: 0 %
CHOLEST SERPL-MCNC: 104 MG/DL
CRP SERPL-MCNC: 57.6 MG/L (ref 0–8)
EOSINOPHIL # BLD AUTO: 0.5 10E3/UL (ref 0–0.7)
EOSINOPHIL NFR BLD AUTO: 4 %
ERYTHROCYTE [DISTWIDTH] IN BLOOD BY AUTOMATED COUNT: 13 % (ref 10–15)
ERYTHROCYTE [SEDIMENTATION RATE] IN BLOOD BY WESTERGREN METHOD: 35 MM/HR (ref 0–20)
FASTING STATUS PATIENT QL REPORTED: ABNORMAL
HCT VFR BLD AUTO: 39.5 % (ref 40–53)
HDLC SERPL-MCNC: 30 MG/DL
HGB BLD-MCNC: 13.5 G/DL (ref 13.3–17.7)
LDLC SERPL CALC-MCNC: 45 MG/DL
LYMPHOCYTES # BLD AUTO: 1.6 10E3/UL (ref 0.8–5.3)
LYMPHOCYTES NFR BLD AUTO: 14 %
MCH RBC QN AUTO: 31.5 PG (ref 26.5–33)
MCHC RBC AUTO-ENTMCNC: 34.2 G/DL (ref 31.5–36.5)
MCV RBC AUTO: 92 FL (ref 78–100)
MONOCYTES # BLD AUTO: 0.8 10E3/UL (ref 0–1.3)
MONOCYTES NFR BLD AUTO: 7 %
NEUTROPHILS # BLD AUTO: 8.5 10E3/UL (ref 1.6–8.3)
NEUTROPHILS NFR BLD AUTO: 75 %
NONHDLC SERPL-MCNC: 74 MG/DL
PLATELET # BLD AUTO: 298 10E3/UL (ref 150–450)
RBC # BLD AUTO: 4.28 10E6/UL (ref 4.4–5.9)
TRIGL SERPL-MCNC: 146 MG/DL
WBC # BLD AUTO: 11.4 10E3/UL (ref 4–11)

## 2022-11-11 PROCEDURE — 85025 COMPLETE CBC W/AUTO DIFF WBC: CPT

## 2022-11-11 PROCEDURE — 86803 HEPATITIS C AB TEST: CPT

## 2022-11-11 PROCEDURE — 80061 LIPID PANEL: CPT

## 2022-11-11 PROCEDURE — 85652 RBC SED RATE AUTOMATED: CPT

## 2022-11-11 PROCEDURE — 86140 C-REACTIVE PROTEIN: CPT

## 2022-11-11 PROCEDURE — 36415 COLL VENOUS BLD VENIPUNCTURE: CPT

## 2022-11-12 LAB — HCV AB SERPL QL IA: NONREACTIVE

## 2022-11-16 NOTE — PROGRESS NOTES
Sabrina Rivera is a 67 year old who is being evaluated via a billable telephone visit.      What phone number would you like to be contacted at? [phone]  How would you like to obtain your AVS? Baljeet Greco   Sabrina Rivera is a 67 year old presenting for the following health issues:  RECHECK (F/U results)      HPI     Recheck   - review results  Sed rate and crp levels were elevated again. Cbc returning to near normal.   Feeling a lot better the past few days. Joint pain has improved a lot. Fatigue has improved a lot.  Noting more stamina. No fevers or hot flashes. No diarrhea or constipation. Appetite is normal. No chest pain/sob/palpitations/dizziness/ha's        Review of Systems   Constitutional, HEENT, cardiovascular, pulmonary, GI, , musculoskeletal, neuro, skin, endocrine and psych systems are negative, except as otherwise noted.      Objective           Vitals:  No vitals were obtained today due to virtual visit.    Physical Exam   healthy, alert and no distress  PSYCH: Alert and oriented times 3; coherent speech, normal   rate and volume, able to articulate logical thoughts, able   to abstract reason, no tangential thoughts, no hallucinations   or delusions  His affect is normal  RESP: No cough, no audible wheezing, able to talk in full sentences  Remainder of exam unable to be completed due to telephone visits    Paulette was seen today for recheck.    Diagnoses and all orders for this visit:    CRP elevated  -     CRP, inflammation; Future  -     ESR: Erythrocyte sedimentation rate; Future    Other elevated white blood cell (WBC) count  -     CBC with platelets and differential; Future      Fatigue and joint pain have improved markedly. Still chronic hip pain for which he sees ortho in a couple of weeks.    Advised supportive and symptomatic treatment.  Follow up with Provider - if condition persists or worsens.           Phone call duration: 13 minutes

## 2022-11-17 ENCOUNTER — VIRTUAL VISIT (OUTPATIENT)
Dept: FAMILY MEDICINE | Facility: CLINIC | Age: 67
End: 2022-11-17
Payer: COMMERCIAL

## 2022-11-17 DIAGNOSIS — D72.828 OTHER ELEVATED WHITE BLOOD CELL (WBC) COUNT: ICD-10-CM

## 2022-11-17 DIAGNOSIS — R79.82 CRP ELEVATED: Primary | ICD-10-CM

## 2022-11-17 PROCEDURE — 99442 PR PHYSICIAN TELEPHONE EVALUATION 11-20 MIN: CPT | Mod: 95 | Performed by: PHYSICIAN ASSISTANT

## 2022-12-01 ENCOUNTER — LAB (OUTPATIENT)
Dept: LAB | Facility: CLINIC | Age: 67
End: 2022-12-01
Payer: COMMERCIAL

## 2022-12-01 DIAGNOSIS — D72.828 OTHER ELEVATED WHITE BLOOD CELL (WBC) COUNT: ICD-10-CM

## 2022-12-01 DIAGNOSIS — R79.82 CRP ELEVATED: ICD-10-CM

## 2022-12-01 LAB
BASOPHILS # BLD AUTO: 0.1 10E3/UL (ref 0–0.2)
BASOPHILS NFR BLD AUTO: 1 %
CRP SERPL-MCNC: 5.9 MG/L (ref 0–8)
EOSINOPHIL # BLD AUTO: 0.4 10E3/UL (ref 0–0.7)
EOSINOPHIL NFR BLD AUTO: 5 %
ERYTHROCYTE [DISTWIDTH] IN BLOOD BY AUTOMATED COUNT: 14.3 % (ref 10–15)
ERYTHROCYTE [SEDIMENTATION RATE] IN BLOOD BY WESTERGREN METHOD: 7 MM/HR (ref 0–20)
HCT VFR BLD AUTO: 40.7 % (ref 40–53)
HGB BLD-MCNC: 13.8 G/DL (ref 13.3–17.7)
LYMPHOCYTES # BLD AUTO: 2.2 10E3/UL (ref 0.8–5.3)
LYMPHOCYTES NFR BLD AUTO: 25 %
MCH RBC QN AUTO: 31.9 PG (ref 26.5–33)
MCHC RBC AUTO-ENTMCNC: 33.9 G/DL (ref 31.5–36.5)
MCV RBC AUTO: 94 FL (ref 78–100)
MONOCYTES # BLD AUTO: 0.6 10E3/UL (ref 0–1.3)
MONOCYTES NFR BLD AUTO: 7 %
NEUTROPHILS # BLD AUTO: 5.7 10E3/UL (ref 1.6–8.3)
NEUTROPHILS NFR BLD AUTO: 63 %
PLATELET # BLD AUTO: 221 10E3/UL (ref 150–450)
RBC # BLD AUTO: 4.32 10E6/UL (ref 4.4–5.9)
WBC # BLD AUTO: 9 10E3/UL (ref 4–11)

## 2022-12-01 PROCEDURE — 36415 COLL VENOUS BLD VENIPUNCTURE: CPT

## 2022-12-01 PROCEDURE — 85025 COMPLETE CBC W/AUTO DIFF WBC: CPT

## 2022-12-01 PROCEDURE — 85652 RBC SED RATE AUTOMATED: CPT

## 2022-12-01 PROCEDURE — 86140 C-REACTIVE PROTEIN: CPT

## 2022-12-06 ENCOUNTER — ANCILLARY PROCEDURE (OUTPATIENT)
Dept: CARDIOLOGY | Facility: CLINIC | Age: 67
End: 2022-12-06
Attending: PHYSICIAN ASSISTANT
Payer: COMMERCIAL

## 2022-12-06 DIAGNOSIS — I25.10 CORONARY ARTERY DISEASE INVOLVING NATIVE CORONARY ARTERY OF NATIVE HEART WITHOUT ANGINA PECTORIS: ICD-10-CM

## 2022-12-06 DIAGNOSIS — R53.83 OTHER FATIGUE: ICD-10-CM

## 2022-12-06 LAB — LVEF ECHO: NORMAL

## 2022-12-06 PROCEDURE — 93321 DOPPLER ECHO F-UP/LMTD STD: CPT | Performed by: STUDENT IN AN ORGANIZED HEALTH CARE EDUCATION/TRAINING PROGRAM

## 2022-12-06 PROCEDURE — 93308 TTE F-UP OR LMTD: CPT | Performed by: STUDENT IN AN ORGANIZED HEALTH CARE EDUCATION/TRAINING PROGRAM

## 2022-12-06 PROCEDURE — 99207 PR STATISTIC IV PUSH SINGLE INITIAL SUBSTANCE: CPT | Performed by: STUDENT IN AN ORGANIZED HEALTH CARE EDUCATION/TRAINING PROGRAM

## 2022-12-06 PROCEDURE — 93325 DOPPLER ECHO COLOR FLOW MAPG: CPT | Performed by: STUDENT IN AN ORGANIZED HEALTH CARE EDUCATION/TRAINING PROGRAM

## 2022-12-06 RX ADMIN — Medication 7 ML: at 13:56

## 2022-12-08 DIAGNOSIS — I25.10 CORONARY ARTERY DISEASE INVOLVING NATIVE CORONARY ARTERY OF NATIVE HEART WITHOUT ANGINA PECTORIS: Primary | ICD-10-CM

## 2023-01-12 ENCOUNTER — TELEPHONE (OUTPATIENT)
Dept: AUDIOLOGY | Facility: CLINIC | Age: 68
End: 2023-01-12
Payer: COMMERCIAL

## 2023-01-12 DIAGNOSIS — H90.3 SENSORINEURAL HEARING LOSS, BILATERAL: Primary | ICD-10-CM

## 2023-01-12 PROCEDURE — 99207 PR DROP WITH A PROCEDURE: CPT | Mod: 25 | Performed by: AUDIOLOGIST

## 2023-01-12 PROCEDURE — V5266 BATTERY FOR HEARING DEVICE: HCPCS | Performed by: AUDIOLOGIST

## 2023-01-12 NOTE — TELEPHONE ENCOUNTER
Called Paulette Mattson's wife, who stated that they would like to  the batteries at the clinic. They would also like some wax filters.     3 month supply of size 312 batteries and wax filters were left at the first floor  for pickup.     CHARGES:              [id number] x36 Batteries ($1). Total: $36 Bill to patient's insurance.    Yuliya Alexander, Audiology Clinic Assistant

## 2023-01-12 NOTE — TELEPHONE ENCOUNTER
[facility] Call Center    Phone Message    May a detailed message be left on voicemail: yes     Reason for Call: Other: Pt wife calling and would vahe to have batteries ordered for his hearing aids. Please reacho ut to spouse once ordered and in. Thank you     Action Taken: Message routed to:  [facility]    Travel Screening: Not Applicable

## 2023-01-31 ENCOUNTER — TRANSFERRED RECORDS (OUTPATIENT)
Dept: HEALTH INFORMATION MANAGEMENT | Facility: CLINIC | Age: 68
End: 2023-01-31

## 2023-02-10 ENCOUNTER — PATIENT OUTREACH (OUTPATIENT)
Dept: GERIATRIC MEDICINE | Facility: CLINIC | Age: 68
End: 2023-02-10
Payer: COMMERCIAL

## 2023-02-10 NOTE — PROGRESS NOTES
[facility] Care Coordination Contact    Member became effective with  [facility] on 2/1/2023 with [facility].  Previous Health Plan: [facility]  Previous Care System: [facility]  Previous care coordinators name and number: Airam Momin [phone] [email or web address]  Waiver Type: N/A  Last MMIS Entry: Date 1/24/23 and Type 01 TEL SCREEN  MMIS visit date (and type) if different from above: 07/29/22 50 NT LOCATE  Services Listed in MMIS: N/A  UTF received: No: Requested on 02/10/23 emailed [facility] for transfer docs  Address/Phone discrepancy: N/A  CM change letter sent    Seble Lobo  Case Management Specialist   [facility]  [phone]

## 2023-02-10 NOTE — LETTER
February 10, 2023    PAULETTE QUIROGA  [address]  [place] [facility] [address]      Dear Paulette:    As a member of [facility] ([facility]) ([facility]), you are provided a care coordinator. I will be your new care coordinator as of 2/1/2023. I will be calling you soon to see how you are doing and determine your needs.    If you have any questions, please feel free to call me at [phone]. If you reach my voice mail, please leave a message and your phone number. If you are hearing impaired, please call the [place] Relay at [phone] or [phone] (hxbdou-yu-ehcgol relay service).    I look forward to speaking with you soon.    Sincerely,      ETELVINA Ramos    E-mail: [email or web address]  Phone: [phone]      [facility] is a health plan that contracts with both Medicare and the [place] Medical Assistance (Medicaid) program to provide benefits of both programs to enrollees. Enrollment in [facility] depends on contract renewal.      [facility]  [id number] DHS Approved ([id number])  [id number] (11/18)

## 2023-02-23 ENCOUNTER — PATIENT OUTREACH (OUTPATIENT)
Dept: GERIATRIC MEDICINE | Facility: CLINIC | Age: 68
End: 2023-02-23
Payer: COMMERCIAL

## 2023-02-23 NOTE — PROGRESS NOTES
[facility] Care Coordination Contact      [facility] Refusal Telephone Assessment    Member refused home visit HRA on 2.23.23 (reason: is independent at this time).    ER visits: No  Hospitalizations: No  Health concerns: will be needing hips replaced reportedly.  Falls/Injuries: Yes: Paulette's wife states he fell out of the shower due to having a hard time stepping over the tub.   ADL/IADL Dependencies: None reported        Member currently receiving the following home care services:   None  Member currently receiving the following community resources:  None  Informal support(s):  None    Advanced Care Planning discussion, complete code section.    [facility] Health Plan sponsored benefits: Shared information re: Silver Sneakers/gym memberships, ASA, Calcium +D.    Follow-Up Plan: Member informed of future contact, plan to f/u with member with a 6 month telephone assessment and offer a home visit.  Contact information shared with member and family, encouraged member to call with any questions or concerns at any time.    This CC note routed to PCP.    ETELVINA Ramos  [facility]  [phone]

## 2023-02-23 NOTE — LETTER
February 24, 2023    PAULETTE QUIROGA  [address]  [place] [address]        Dear Paulette:    As a member of [facility], we offer a health risk assessment at no cost to you. I know you don't want to have the assessment right now. If you change your mind, please call me at the number below.    Who performs the health risk assessment?  A [facility] Care Coordinator performs the assessment. Our Care Coordinators can also help you understand your benefits. They can tell you about services to aid you at home, such as managing your care with your doctors if your health worsens.    Our Care Coordinators are here for you if you need:    Support for activities you used to do by yourself (including making meals, bathing, and paying bills)    Equipment for bathroom or home safety    Help finding a new place to live    Information on staying healthy, preventing falls and immunizations    Questions?  If you have questions, or you would like to do the assessment, call me at [phone]. TTY users call [phone]. I'm here from 8am to 5pm. I may reach out to you again soon.      Sincerely,          ETELVINA Ramos    E-mail: [email or web address]  Phone: [phone]      [employer]    <[id number] accepted    [id number] (12/2021)  [id number]>

## 2023-02-23 NOTE — Clinical Note
Paulette will be coming to see you to get a prescription for a shower chair as well as Cpap supplies.  Radha Cornejo, [facility] [phone]

## 2023-02-23 NOTE — PROGRESS NOTES
[facility] Care Coordination Contact    Received referral for new member on 2.22.23. Phone call made to the pt and introduced self and role. Paulette states he is needing tubing, filters, mask, etc for his Cpap machine. He was getting it at a [facility] DME but they no longer accept his insurance. Discussion about having the Cpap specifications sent to  DME (fax: [phone]) as well as the order. We also discussed seeing his PCP to obtain a new order as he wasn't sure how old the order was. He also wants a shower chair.     Paulette and his wife explained that he is independent and has no needs at this time. He is working with a doctor for his hips and feels he may need surgery on them in the near future. He inquired about grab bars in the shower. [facility] does have an allowance for these items if needed so we could explore that option if he would like.    Assessment was refused at this time due to being independent.    Radha Cornejo, [facility]  [phone]

## 2023-02-24 NOTE — PROGRESS NOTES
"Per CC, mailed client a \"Refusal of Home Visit\" letter.    Seble Lobo  Case Management Specialist   [facility]  [phone]      "

## 2023-03-14 ENCOUNTER — TELEPHONE (OUTPATIENT)
Dept: FAMILY MEDICINE | Facility: CLINIC | Age: 68
End: 2023-03-14

## 2023-03-14 NOTE — TELEPHONE ENCOUNTER
Left message for patient to call back-- needs to set up an Annual  Wellness check/visit.     Please schedule asap with patients PCP.     Bronwyn PATE,   [facility] [address] [facility]

## 2023-03-22 ENCOUNTER — OFFICE VISIT (OUTPATIENT)
Dept: FAMILY MEDICINE | Facility: CLINIC | Age: 68
End: 2023-03-22
Payer: COMMERCIAL

## 2023-03-22 VITALS
DIASTOLIC BLOOD PRESSURE: 74 MMHG | SYSTOLIC BLOOD PRESSURE: 110 MMHG | RESPIRATION RATE: 20 BRPM | BODY MASS INDEX: 29.22 KG/M2 | HEART RATE: 90 BPM | OXYGEN SATURATION: 98 % | TEMPERATURE: 98.1 F | HEIGHT: 75 IN | WEIGHT: 235 LBS

## 2023-03-22 DIAGNOSIS — G47.33 OSA (OBSTRUCTIVE SLEEP APNEA): Primary | ICD-10-CM

## 2023-03-22 PROCEDURE — 99213 OFFICE O/P EST LOW 20 MIN: CPT | Performed by: PHYSICIAN ASSISTANT

## 2023-03-22 ASSESSMENT — PAIN SCALES - GENERAL: PAINLEVEL: MODERATE PAIN (4)

## 2023-03-22 NOTE — PROGRESS NOTES
"      Angus Franks is a 67 year old, presenting for the following health issues:  Sleep Apnea (Needs replacement hose/and or a new machine)  No flowsheet data found.  History of Present Illness       Reason for visit:  CPAP supplies - Shower chairHe exercises with enough effort to increase his heart rate 9 or less minutes per day.  He exercises with enough effort to increase his heart rate 3 or less days per week. He is missing 1 dose(s) of medications per week.       Feeling good. Has used a cpap for 15 yrs. Wears it nightly and it's well tolerated and it works well for him. His level of fatigue has improved, some occasional breakthrough snoring.       Review of Systems   Constitutional, HEENT, cardiovascular, pulmonary, GI, , musculoskeletal, neuro, skin, endocrine and psych systems are negative, except as otherwise noted.      Objective    /74   Pulse 90   Temp 98.1  F (36.7  C) (Temporal)   Resp 20   Ht 1.905 m (6' 3\")   Wt 106.6 kg (235 lb)   SpO2 98%   BMI 29.37 kg/m    Body mass index is 29.37 kg/m .  Physical Exam         Eye exam - right eye normal lid, conjunctiva, cornea, pupil and fundus, left eye normal lid, conjunctiva, cornea, pupil and fundus.  Thyroid not palpable, not enlarged, no nodules detected.  CHEST:chest clear to IPPA, no tachypnea, retractions or cyanosis and S1, S2 normal, no murmur, no gallop, rate regular.    Paulette was seen today for sleep apnea.    Diagnoses and all orders for this visit:    TANIA (obstructive sleep apnea)  -     CPAP Order for DME - ONLY FOR DME      work on lifestyle modification      "

## 2023-04-03 ENCOUNTER — ALLIED HEALTH/NURSE VISIT (OUTPATIENT)
Dept: AUDIOLOGY | Facility: CLINIC | Age: 68
End: 2023-04-03
Payer: COMMERCIAL

## 2023-04-03 DIAGNOSIS — H90.3 SENSORINEURAL HEARING LOSS, ASYMMETRICAL: Primary | ICD-10-CM

## 2023-04-03 PROCEDURE — 92593 PR HEARING AID CHECK, BINAURAL: CPT | Performed by: AUDIOLOGIST

## 2023-04-03 NOTE — PROGRESS NOTES
"HEARING AID DROP-OFF    Patient Name:  Paulette Dorsey    Patient Age:   67 year old    :  1955    Background:   Patient dropped off their hearing aid with the report of needs \"ju ju re installed\".      SIDE: Both    : Widex    TYPE: Unique 220 RITE    S/N:      R: [id number]     L: [id number]    WARRANTY:  2020    Procedures:   Per patient request the programming from our last session (2022) was re-installed in the hearing aids. General cleaning of the hearing aids and earmolds was performed. Biologic listening check found the hearing aids sounding crisp and clear.    Plan:   Patient was contacted in regards to status of hearing aid/s dropped off today. I spoke to Srinivasan and let her know the hearing aids were ready for . Also I let her know if Paulette would like more changes an in person visit would be best.       Tomasz Marin CCC-A  Licensed Audiologist  4/3/2023      "

## 2023-04-12 ENCOUNTER — TRANSFERRED RECORDS (OUTPATIENT)
Dept: HEALTH INFORMATION MANAGEMENT | Facility: CLINIC | Age: 68
End: 2023-04-12

## 2023-04-15 ENCOUNTER — HEALTH MAINTENANCE LETTER (OUTPATIENT)
Age: 68
End: 2023-04-15

## 2023-05-22 ENCOUNTER — ALLIED HEALTH/NURSE VISIT (OUTPATIENT)
Dept: AUDIOLOGY | Facility: CLINIC | Age: 68
End: 2023-05-22
Payer: COMMERCIAL

## 2023-05-22 DIAGNOSIS — H90.3 SENSORINEURAL HEARING LOSS, ASYMMETRICAL: Primary | ICD-10-CM

## 2023-05-22 PROCEDURE — 92593 PR HEARING AID CHECK, BINAURAL: CPT | Performed by: AUDIOLOGIST

## 2023-05-22 PROCEDURE — V5266 BATTERY FOR HEARING DEVICE: HCPCS | Mod: NU | Performed by: AUDIOLOGIST

## 2023-05-22 NOTE — PROGRESS NOTES
HEARING AID DROP-OFF    Patient Name:  Paulette Dorsey    Patient Age:   67 year old    :  1955    Background:   Patient dropped off their hearing aid with the report of needing a tweek.      SIDE: Both    : Widex                          TYPE: Unique 220 RITE                          S/N:                                       R: [id number]                                      L: [id number]                          WARRANTY:  2020    Procedures:   Per patient request gain was increased 6 dB at 4000 Hz for program 1, 9 dB for program 2, and only 4 dB was able to be increased in program 3 (patient requested 12 dB) as we are at the limits of the receivers. General cleaning of the hearing aids and earmolds was performed. Waxguards were replaced bilaterally. Per patient request the 90 day supply of batteries (36 size 312) and waxguards, were placed with the hearing aids at the  for .     Plan:   Patient was contacted in regards to status of hearing aid/s dropped off today.     Tomasz Marin CCC-A  Licensed Audiologist  2023

## 2023-05-30 ENCOUNTER — OFFICE VISIT (OUTPATIENT)
Dept: FAMILY MEDICINE | Facility: CLINIC | Age: 68
End: 2023-05-30
Payer: COMMERCIAL

## 2023-05-30 VITALS
TEMPERATURE: 98.1 F | SYSTOLIC BLOOD PRESSURE: 116 MMHG | DIASTOLIC BLOOD PRESSURE: 68 MMHG | WEIGHT: 225.4 LBS | HEIGHT: 76 IN | BODY MASS INDEX: 27.45 KG/M2 | RESPIRATION RATE: 20 BRPM | HEART RATE: 77 BPM | OXYGEN SATURATION: 97 %

## 2023-05-30 DIAGNOSIS — H91.93 BILATERAL CHANGE IN HEARING: Primary | ICD-10-CM

## 2023-05-30 DIAGNOSIS — Z23 ENCOUNTER FOR VACCINATION: ICD-10-CM

## 2023-05-30 PROCEDURE — 0121A COVID-19 BIVALENT 12+ (PFIZER): CPT | Performed by: PHYSICIAN ASSISTANT

## 2023-05-30 PROCEDURE — 90677 PCV20 VACCINE IM: CPT | Performed by: PHYSICIAN ASSISTANT

## 2023-05-30 PROCEDURE — G0009 ADMIN PNEUMOCOCCAL VACCINE: HCPCS | Performed by: PHYSICIAN ASSISTANT

## 2023-05-30 PROCEDURE — 99213 OFFICE O/P EST LOW 20 MIN: CPT | Mod: 25 | Performed by: PHYSICIAN ASSISTANT

## 2023-05-30 PROCEDURE — 91312 COVID-19 BIVALENT 12+ (PFIZER): CPT | Performed by: PHYSICIAN ASSISTANT

## 2023-05-30 ASSESSMENT — ENCOUNTER SYMPTOMS
SHORTNESS OF BREATH: 0
COUGH: 0
FEVER: 0

## 2023-05-30 ASSESSMENT — PAIN SCALES - GENERAL: PAINLEVEL: NO PAIN (0)

## 2023-05-30 NOTE — PATIENT INSTRUCTIONS
There does not appear to be any wax buildup or fluid in the ear canals.  Your hearing is likely changing over time.  I am happy to hear you have an appointment set up to get your new hearing aids put in June 5/6.  Please continue with this appointment as planned and this will likely improve your hearing.     Your pneumonia and COVID-19 vaccines were updated today.  Please see your pharmacy to get your shingles vaccine.  Reach out with any questions or concerns.  Return to clinic for any new or worsening symptoms.

## 2023-05-30 NOTE — PROGRESS NOTES
Assessment & Plan     Bilateral change in hearing  Patient is a 67-year-old male who presents to clinic with significant other due to decreased hearing, especially at high tones.  Patient concern for cerumen impaction.  Patient also wears bilateral hearing aids and is scheduled to receive new hearing aids in 6 days.  Vital signs normal.  Physical exam without OE, OM, or cerumen impaction.  Symptoms are most consistent with hearing loss.  Recommended continuing with new hearing aids as planned.  Return precautions provided.      Encounter for vaccination  Patient due for vaccinations today and is agreeable to receiving them.  Recommended receiving shingles vaccine from pharmacy.  - COVID-19 BIVALENT 12+ (PFIZER)  - Pneumococcal 20 Valent Conjugate (PCV20)      See Patient Instructions    Summer Henson PA-C  [facility] GHULAM Franks is a 67 year old, presenting for the following health issues:  Ear Problem (Patient would like Ear Cleaning. Stated he has been having troubles hearing so he wonders if there is a lot of wax in his ears.)        5/30/2023     6:59 AM   Additional Questions   Roomed by Maggie ARCHER MA   Accompanied by Srinivasan         5/30/2023     6:59 AM   Patient Reported Additional Medications   Patient reports taking the following new medications None     History of Present Illness       Reason for visit:  Check for ear wax and remove if necessaryHe exercises with enough effort to increase his heart rate 9 or less minutes per day.         Patient notes worsening hearing that is around 50% decreased from a few months ago. No pain or ringing. He notes the high noises are dropping off. Patient has been wearing bilateral hearing aids for around 18 years. Patient had hearing tested over the [facility]       Review of Systems   Constitutional: Negative for fever.   HENT: Positive for hearing loss. Negative for congestion, ear discharge and ear pain.    Respiratory: Negative for cough  "and shortness of breath.             Objective    /68   Pulse 77   Temp 98.1  F (36.7  C) (Temporal)   Resp 20   Ht 1.94 m (6' 4.38\")   Wt 102.2 kg (225 lb 6.4 oz)   SpO2 97%   BMI 27.17 kg/m    Body mass index is 27.17 kg/m .  Physical Exam  Vitals and nursing note reviewed.   Constitutional:       General: He is not in acute distress.     Appearance: Normal appearance.   HENT:      Head: Normocephalic and atraumatic.      Right Ear: Tympanic membrane, ear canal and external ear normal. There is no impacted cerumen.      Left Ear: Tympanic membrane, ear canal and external ear normal. There is no impacted cerumen.      Mouth/Throat:      Mouth: Mucous membranes are moist.      Pharynx: Oropharynx is clear.   Eyes:      Extraocular Movements: Extraocular movements intact.      Pupils: Pupils are equal, round, and reactive to light.   Cardiovascular:      Rate and Rhythm: Normal rate.   Pulmonary:      Effort: Pulmonary effort is normal.   Musculoskeletal:         General: Normal range of motion.      Cervical back: Normal range of motion.   Skin:     General: Skin is warm and dry.   Neurological:      General: No focal deficit present.      Mental Status: He is alert.   Psychiatric:         Mood and Affect: Mood normal.         Behavior: Behavior normal.                            "

## 2023-05-30 NOTE — NURSING NOTE
Prior to immunization administration, verified patients identity using patient s name and date of birth. Please see Immunization Activity for additional information.     Screening Questionnaire for Adult Immunization    Are you sick today?   No   Do you have allergies to medications, food, a vaccine component or latex?   No   Have you ever had a serious reaction after receiving a vaccination?   No   Do you have a long-term health problem with heart, lung, kidney, or metabolic disease (e.g., diabetes), asthma, a blood disorder, no spleen, complement component deficiency, a cochlear implant, or a spinal fluid leak?  Are you on long-term aspirin therapy?   No   Do you have cancer, leukemia, HIV/AIDS, or any other immune system problem?   No   Do you have a parent, brother, or sister with an immune system problem?   No   In the past 3 months, have you taken medications that affect  your immune system, such as prednisone, other steroids, or anticancer drugs; drugs for the treatment of rheumatoid arthritis, Crohn s disease, or psoriasis; or have you had radiation treatments?   No   Have you had a seizure, or a brain or other nervous system problem?   No   During the past year, have you received a transfusion of blood or blood    products, or been given immune (gamma) globulin or antiviral drug?   No   For women: Are you pregnant or is there a chance you could become       pregnant during the next month?   No   Have you received any vaccinations in the past 4 weeks?   No     Immunization questionnaire answers were all negative.      Injection of Covid (Pfizer) and [id number] given by Maggie Mayo MA. Patient instructed to remain in clinic for 15 minutes afterwards, and to report any adverse reactions.     Screening performed by Maggie Mayo MA on 5/30/2023 at 7:22 AM.

## 2023-06-28 ENCOUNTER — PATIENT OUTREACH (OUTPATIENT)
Dept: GERIATRIC MEDICINE | Facility: CLINIC | Age: 68
End: 2023-06-28
Payer: COMMERCIAL

## 2023-06-28 NOTE — TELEPHONE ENCOUNTER
[facility] Care Coordination Contact    Internal CC change effective 07/01/2023.  Mailed member CC Change letter.  Additional tasks to be completed by CMS include: update database & EPIC, enter CC Change in MMIS, and move member files on Skyf[employer] drive.    Reta Lipscomb  Care Management Specialist  [facility]  [phone]

## 2023-06-28 NOTE — LETTER
June 28, 2023    PAULETTE QUIROGA  [address]  [place] [address]      Dear Paulette:    As a member of [facility] ([facility]) ([facility]), you are provided a care coordinator. I will be your new care coordinator as of 07/01/2023. I will be calling you soon to see how you are doing and determine your needs.    If you have any questions, please feel free to call me at [phone]. If you reach my voice mail, please leave a message and your phone number. If you are hearing impaired, please call the [place] Relay at [phone] or [phone] (egtxtx-ky-uaupdi relay service).    I look forward to speaking with you soon.    Sincerely,      Radha Fernandez RN, N  [phone]  [email or web address]      [facility] is a health plan that contracts with both Medicare and the [place] Medical Assistance (Medicaid) program to provide benefits of both programs to enrollees. Enrollment in [facility] depends on contract renewal.      [facility]  [id number] DHS Approved ([id number])  [id number] (11/18)

## 2023-08-22 ENCOUNTER — PATIENT OUTREACH (OUTPATIENT)
Dept: GERIATRIC MEDICINE | Facility: CLINIC | Age: 68
End: 2023-08-22
Payer: COMMERCIAL

## 2023-08-22 NOTE — PROGRESS NOTES
[facility] Care Coordination Contact    Called member to complete six month assessment and left a message with his wife, Srinivasan requesting a return call.    Radha Fernandez RN PHN  Care Coordinator  [facility]  [phone]

## 2023-08-29 ENCOUNTER — PATIENT OUTREACH (OUTPATIENT)
Dept: GERIATRIC MEDICINE | Facility: CLINIC | Age: 68
End: 2023-08-29
Payer: COMMERCIAL

## 2023-08-29 NOTE — PROGRESS NOTES
[facility] Care Coordination Contact    Called member to complete six month assessment and left a message requesting a return call. This is the Care Coordinator's 3rd attempt to contact member. Care Coordinator will request an UTR letter be sent as attempt #4.     Radha Fernandez RN PHN  Care Coordinator  [facility]  [phone]

## 2023-08-29 NOTE — LETTER
August 30, 2023      PAULETTE QUIROGA  [address]  [place] [address]        Dear Paulette:     I m your care coordinator. I ve been unable to reach you by phone. I am writing to ask you or an authorized representative to call me at [phone]. If you reach my voicemail, please leave a message with your daytime telephone number. . Include a date and time that I can call you. If you are hearing impaired, call the [place] Relay at [phone] or [phone] (gngqlt-ln-dxnizq relay service).     The reason I am trying to reach you is:    [] To schedule an assessment  [x] For your six (6)-month check-in  [] Other:      Please call me as soon as you receive this letter. I look forward to speaking with you.    Sincerely,      Radha Fernandez RN, N  [phone]  [email or web address]      [facility] ([facility]) is a health plan that contracts with both Medicare and the [place] Medical Assistance (Medicaid) program to provide benefits of both programs to enrollees. Enrollment in [facility] depends on contract renewal.    [id number] accepted  [id number]                                                                         A (08/2022)

## 2023-08-30 NOTE — PROGRESS NOTES
"[facility] Care Coordination Contact    Per CC, mailed client an \"Unable to Contact\" letter.  Mailed [employer] leave behind doc.       Cortney Jalloh  Care Management Specialist   [facility]   [phone]    "

## 2023-09-14 NOTE — PROGRESS NOTES
"SUBJECTIVE:  Paulette Dorsey is a 68 year old male who is seen as self referral for  bilateral hip pain that started  4-5 YEARS ago.     Symptoms: left > right   PAIN walking, can't spread legs to sit on   Pain at night   Hips can shift or lock. .  Groin pain.  Treatment up to this point: previous corticosteroid injections in April, which helped x 2 weeks or so.    Had total hip arthroplasty scheduled in August, but cancelled (at [facility]). He and surgeon \"didn't connect\", he says    Past medical history:   Past Medical History:   Diagnosis Date    Arthritis     Colon cancer (H) 8/05    [facility]  Had surgery and chemo    Combined forms of age-related cataract, mild, of both eyes 8/14/2021    GERD (gastroesophageal reflux disease)     High cholesterol     Peripheral arterial disease (H)     popliteal aneurysm [place]     Patient Active Problem List   Diagnosis    CARDIOVASCULAR SCREENING; LDL GOAL LESS THAN 100    Choroidal nevus of left eye    SNHL (sensory-neural hearing loss), asymmetrical    Bilateral popliteal artery aneurysm (H)    Vitreomacular traction syndrome of right eye    Combined forms of age-related cataract, mild, of both eyes        Surgical:   Past Surgical History:   Procedure Laterality Date    COLECTOMY  8/05    partial colectomy    HERNIA REPAIR, INGUINAL RT/LT      Hernia Repair, Femoral RT    SURGICAL HISTORY OF -       left popliteal bypass secondary to aneurysm       Family Hx:    Family History   Problem Relation Age of Onset    Breast Cancer Mother     Respiratory Mother         emphesema    Gastrointestinal Disease Mother         diverticulitis    Alcohol/Drug Mother     Arthritis Mother     Osteoporosis Mother     Gynecology Mother     Hypertension Father     Alcohol/Drug Father     Arthritis Father     Lipids Father     Cardiovascular Father         AAA    Cancer Brother         sarcoidosis    Gastrointestinal Disease Brother         diverticulitis    " Thyroid Disease Sister     Asthma Other         M. Aunt (Eneida)    Cancer - colorectal Other         Cousin  from colon ca    Thyroid Disease Other         M. cousin had thyroid issues    Psychotic Disorder Other         M. Cousin bipolar and suicide    Depression Other         M. cousin    C.A.D. No family hx of     Diabetes No family hx of     Cerebrovascular Disease No family hx of     Prostate Cancer No family hx of     Allergies No family hx of     Alzheimer Disease No family hx of     Anesthesia Reaction No family hx of     Obesity No family hx of     Neurologic Disorder No family hx of     Congenital Anomalies No family hx of     Connective Tissue Disorder No family hx of     Blood Disease No family hx of     Glaucoma No family hx of     Macular Degeneration No family hx of        Social Hx:   Social History     Socioeconomic History    Marital status: Single   Tobacco Use    Smoking status: Former     Types: Cigars     Quit date: 2005     Years since quittin.7    Smokeless tobacco: Never    Tobacco comments:     occasional cigar   Vaping Use    Vaping Use: Never used   Substance and Sexual Activity    Alcohol use: Yes     Alcohol/week: 0.0 standard drinks of alcohol     Comment: little bit on weekends    Drug use: No   Other Topics Concern    Parent/sibling w/ CABG, MI or angioplasty before 65F 55M? No       REVIEW OF SYSTEMS:    CONSTITUTIONAL:  NEGATIVE for fever, chills, change in weight  INTEGUMENTARY/SKIN:  NEGATIVE for worrisome rashes, moles or lesions  EYES:  NEGATIVE for vision changes or irritation  ENT/MOUTH:  NEGATIVE for ear, mouth and throat problems  RESP:  NEGATIVE for significant cough or SOB  BREAST:  NEGATIVE for masses, tenderness or discharge  CV:  NEGATIVE for chest pain, palpitations or peripheral edema  GI:  NEGATIVE for nausea, abdominal pain, heartburn, or change in bowel habits  :  Negative   MUSCULOSKELETAL:  See HPI above  NEURO:  NEGATIVE for weakness, dizziness  or paresthesias  ENDOCRINE:  NEGATIVE for temperature intolerance, skin/hair changes  HEME/ALLERGY/IMMUNE:  NEGATIVE for bleeding problems  PSYCHIATRIC:  NEGATIVE for changes in mood or affect    /70 (BP Location: Left arm, Patient Position: Sitting, Cuff Size: Adult Regular)   Pulse 74   SpO2 96%     EXAM:  GENERAL APPEARANCE: healthy, alert, and no distress   GAIT: slow  SKIN: no suspicious lesions or rashes  NEURO: normal strength and tone, sentation intact, reflexes normal, DTR symmetrically normal in upper extremities, and DTR symmetrically normal in lower extremities  PSYCH:  mentation appears normal and affect normal/bright  VASCULAR:  pulses intact, good cappillary refill  LYMPH:  no lymphadenopathy  RESP:  no overt rhonchi or weazes    MUSCULOSKELETAL:  Bilateral hips  IR 10, ER 10, abduction 10, flexion to 110 bilateral, 10-15 degreee flexion contractures    Lumbar range of motion: limited. Has low back pain as well.  Toe stand: able  Heel stand: Able  Seated SLR: negative  Supine SLR: negative  Sensation:decreased in bilateral plantar feet from chemo, and inner left leg from cabg surgery  Motor: all normal      X-RAY INTERPRETATION:  from today. Went over the results with him fully.  Advanced degenerative changes of both hip joints. There is some  irregularity traversing the left subcapital region which may represent  superimposed irregularity of the posterior acetabulum but is difficult  to exclude a nondisplaced fracture and CT is recommended. Pelvis  negative for fracture.      ASSESSMENT/PLAN:  Encounter Diagnosis   Name Primary?    Primary osteoarthritis of both hips Yes   I don't think he has a fracture of the left femoral head,  because he doesn't have pain with every step, and the left is not much worse than the right.     Plan: Total Hip Arthroplasty:   We talked about the patient's condition and diagnosis.  Because of severe arthritis, and failure of conservative measures, I have  suggested left total hip arthroplasty.  I've talked in depth about the procedure and the risks, which include, but are not limited to blood loss requiring transfusion, infection, neurovascular injury, DVT, PE, pain, (both perioperative and persistent post-recovery pain), dislocation, leg length discrepancy, intra-operative fracture, potential anesthetic and perioperative medical complications, and the possibility of needing additional procedures. We also talked about recovery time, which differs from patient to patient.   Medical clearance would need to be obtained.      The overall risk for thromboembolic events in this patient is low. ASA would be used for postoperative prophylaxis.  We discussed the different approaches. He is interested in the Anterior approach, and would like to look into that a bit further before committing to the procedure.     Other special considerations: has adverse reactions to any opiates.   Nsaids and tylenol are ok  Large sizes   Templates: pending    Return to clinic as needed     CHARLES Lai MD  Dept. Orthopedic Surgery  [facility]

## 2023-09-19 ENCOUNTER — OFFICE VISIT (OUTPATIENT)
Dept: ORTHOPEDICS | Facility: CLINIC | Age: 68
End: 2023-09-19
Payer: COMMERCIAL

## 2023-09-19 ENCOUNTER — PREP FOR PROCEDURE (OUTPATIENT)
Dept: ORTHOPEDICS | Facility: CLINIC | Age: 68
End: 2023-09-19

## 2023-09-19 ENCOUNTER — ANCILLARY PROCEDURE (OUTPATIENT)
Dept: GENERAL RADIOLOGY | Facility: CLINIC | Age: 68
End: 2023-09-19
Attending: ORTHOPAEDIC SURGERY
Payer: COMMERCIAL

## 2023-09-19 VITALS — SYSTOLIC BLOOD PRESSURE: 128 MMHG | HEART RATE: 74 BPM | DIASTOLIC BLOOD PRESSURE: 70 MMHG | OXYGEN SATURATION: 96 %

## 2023-09-19 DIAGNOSIS — M16.0 PRIMARY OSTEOARTHRITIS OF BOTH HIPS: Primary | ICD-10-CM

## 2023-09-19 DIAGNOSIS — M16.0 OSTEOARTHRITIS OF BOTH HIPS: ICD-10-CM

## 2023-09-19 PROCEDURE — 99204 OFFICE O/P NEW MOD 45 MIN: CPT | Performed by: ORTHOPAEDIC SURGERY

## 2023-09-19 PROCEDURE — 73522 X-RAY EXAM HIPS BI 3-4 VIEWS: CPT | Mod: TC | Performed by: RADIOLOGY

## 2023-09-19 ASSESSMENT — PAIN SCALES - GENERAL: PAINLEVEL: SEVERE PAIN (6)

## 2023-09-19 NOTE — LETTER
"    9/19/2023         RE: Paulette Dorsey  [address]   [facility] [address]        Dear Colleague,    Thank you for referring your patient, Paulette Dorsey, to the [facility]. Please see a copy of my visit note below.    SUBJECTIVE:  Paulette Dorsey is a 68 year old male who is seen as self referral for  bilateral hip pain that started  4-5 YEARS ago.     Symptoms: left > right   PAIN walking, can't spread legs to sit on   Pain at night   Hips can shift or lock. .  Groin pain.  Treatment up to this point: previous corticosteroid injections in April, which helped x 2 weeks or so.    Had total hip arthroplasty scheduled in August, but cancelled (at [facility]). He and surgeon \"didn't connect\", he says    Past medical history:   Past Medical History:   Diagnosis Date     Arthritis      Colon cancer (H) 8/05    Dr. King Noe, oncology [facility].  Had surgery and chemo     Combined forms of age-related cataract, mild, of both eyes 8/14/2021     GERD (gastroesophageal reflux disease)      High cholesterol      Peripheral arterial disease (H)     popliteal aneurysm [place]     Patient Active Problem List   Diagnosis     CARDIOVASCULAR SCREENING; LDL GOAL LESS THAN 100     Choroidal nevus of left eye     SNHL (sensory-neural hearing loss), asymmetrical     Bilateral popliteal artery aneurysm (H)     Vitreomacular traction syndrome of right eye     Combined forms of age-related cataract, mild, of both eyes        Surgical:   Past Surgical History:   Procedure Laterality Date     COLECTOMY  8/05    partial colectomy     HERNIA REPAIR, INGUINAL RT/LT      Hernia Repair, Femoral RT     SURGICAL HISTORY OF -       left popliteal bypass secondary to aneurysm       Family Hx:    Family History   Problem Relation Age of Onset     Breast Cancer Mother      Respiratory Mother         emphesema     Gastrointestinal Disease Mother         diverticulitis     Alcohol/Drug Mother      Arthritis " Mother      Osteoporosis Mother      Gynecology Mother      Hypertension Father      Alcohol/Drug Father      Arthritis Father      Lipids Father      Cardiovascular Father         AAA     Cancer Brother         sarcoidosis     Gastrointestinal Disease Brother         diverticulitis     Thyroid Disease Sister      Asthma Other         M. Aunt (Eneida)     Cancer - colorectal Other         Cousin  from colon ca     Thyroid Disease Other         M. cousin had thyroid issues     Psychotic Disorder Other         M. Cousin bipolar and suicide     Depression Other         M. cousin     C.A.D. No family hx of      Diabetes No family hx of      Cerebrovascular Disease No family hx of      Prostate Cancer No family hx of      Allergies No family hx of      Alzheimer Disease No family hx of      Anesthesia Reaction No family hx of      Obesity No family hx of      Neurologic Disorder No family hx of      Congenital Anomalies No family hx of      Connective Tissue Disorder No family hx of      Blood Disease No family hx of      Glaucoma No family hx of      Macular Degeneration No family hx of        Social Hx:   Social History     Socioeconomic History     Marital status: Single   Tobacco Use     Smoking status: Former     Types: Cigars     Quit date: 2005     Years since quittin.7     Smokeless tobacco: Never     Tobacco comments:     occasional cigar   Vaping Use     Vaping Use: Never used   Substance and Sexual Activity     Alcohol use: Yes     Alcohol/week: 0.0 standard drinks of alcohol     Comment: little bit on weekends     Drug use: No   Other Topics Concern     Parent/sibling w/ CABG, MI or angioplasty before 65F 55M? No       REVIEW OF SYSTEMS:    CONSTITUTIONAL:  NEGATIVE for fever, chills, change in weight  INTEGUMENTARY/SKIN:  NEGATIVE for worrisome rashes, moles or lesions  EYES:  NEGATIVE for vision changes or irritation  ENT/MOUTH:  NEGATIVE for ear, mouth and throat problems  RESP:  NEGATIVE for  significant cough or SOB  BREAST:  NEGATIVE for masses, tenderness or discharge  CV:  NEGATIVE for chest pain, palpitations or peripheral edema  GI:  NEGATIVE for nausea, abdominal pain, heartburn, or change in bowel habits  :  Negative   MUSCULOSKELETAL:  See HPI above  NEURO:  NEGATIVE for weakness, dizziness or paresthesias  ENDOCRINE:  NEGATIVE for temperature intolerance, skin/hair changes  HEME/ALLERGY/IMMUNE:  NEGATIVE for bleeding problems  PSYCHIATRIC:  NEGATIVE for changes in mood or affect    /70 (BP Location: Left arm, Patient Position: Sitting, Cuff Size: Adult Regular)   Pulse 74   SpO2 96%     EXAM:  GENERAL APPEARANCE: healthy, alert, and no distress   GAIT: slow  SKIN: no suspicious lesions or rashes  NEURO: normal strength and tone, sentation intact, reflexes normal, DTR symmetrically normal in upper extremities, and DTR symmetrically normal in lower extremities  PSYCH:  mentation appears normal and affect normal/bright  VASCULAR:  pulses intact, good cappillary refill  LYMPH:  no lymphadenopathy  RESP:  no overt rhonchi or weazes    MUSCULOSKELETAL:  Bilateral hips  IR 10, ER 10, abduction 10, flexion to 110 bilateral, 10-15 degreee flexion contractures    Lumbar range of motion: limited. Has low back pain as well.  Toe stand: able  Heel stand: Able  Seated SLR: negative  Supine SLR: negative  Sensation:decreased in bilateral plantar feet from chemo, and inner left leg from cabg surgery  Motor: all normal      X-RAY INTERPRETATION:  from today. Went over the results with him fully.  Advanced degenerative changes of both hip joints. There is some  irregularity traversing the left subcapital region which may represent  superimposed irregularity of the posterior acetabulum but is difficult  to exclude a nondisplaced fracture and CT is recommended. Pelvis  negative for fracture.      ASSESSMENT/PLAN:  Encounter Diagnosis   Name Primary?     Primary osteoarthritis of both hips Yes   I don't  think he has a fracture of the left femoral head,  because he doesn't have pain with every step, and the left is not much worse than the right.     Plan: Total Hip Arthroplasty:   We talked about the patient's condition and diagnosis.  Because of severe arthritis, and failure of conservative measures, I have suggested left total hip arthroplasty.  I've talked in depth about the procedure and the risks, which include, but are not limited to blood loss requiring transfusion, infection, neurovascular injury, DVT, PE, pain, (both perioperative and persistent post-recovery pain), dislocation, leg length discrepancy, intra-operative fracture, potential anesthetic and perioperative medical complications, and the possibility of needing additional procedures. We also talked about recovery time, which differs from patient to patient.   Medical clearance would need to be obtained.      The overall risk for thromboembolic events in this patient is low. ASA would be used for postoperative prophylaxis.  We discussed the different approaches. He is interested in the Anterior approach, and would like to look into that a bit further before committing to the procedure.     Other special considerations: has adverse reactions to any opiates.   Nsaids and tylenol are ok  Large sizes   Templates: pending    Return to clinic as needed     CHARLES Lai MD  Dept. Orthopedic Surgery  [facility]          Again, thank you for allowing me to participate in the care of your patient.        Sincerely,        Venu Lai MD

## 2023-09-25 NOTE — PROGRESS NOTES
"Chief Complaint:   Chief Complaint   Patient presents with    Left Hip - Pain     Discuss Surgery options    Right Hip - Pain       HISTORY OF PRESENT ILLNESS    Paulette Dorsey is a 68 year old male seen for evaluation of ongoing bilateral hip pain with no known injury.   Pain has been present for 5 years or more. Left more than right.  Locates pain to the front of the hips, groin, but sometimes along the outer aspect. Mild pain at rest, worse with activities, standing, walking, sit to stand. Does have pain at night rolling side to side. Denies perceived limb length inequality.    Seen by Dr. Denise Lai, 9/19/2023, for the same. Referred to discuss anterior hip replacement.    Treatment has been cortisone injections 4/2023 with a couple of weeks relief. Therapy a few years back. Takes over the counter ibuprofen, aspirin. He doesn't tolerate opioid pain medications.    Had total hip arthroplasty scheduled in August, but cancelled (at [facility]). He and surgeon \"didn't connect\", he says     Chronic low back pain since teenager.  Left lower extremity numbness and tingling from previous bypass surgery. Bottoms of feeet are numb from chemotherapy.      Present symptoms: pain, stiffness.    Pain severity: 7/10  Pain quality: aching and sharp  Frequency of symptoms: are constant  Exacerbating Factors: weight bearing, stairs, [id number], in and out of car, donning shoes/socks.  Relieving Factors: rest, sitting  Night Pain: Yes  Pain while at rest: Yes   Associated numbness or tingling:  unrelated but toes have numbness and tingling.        Other PMH:  has a past medical history of Arthritis, Colon cancer (H) (8/05), Combined forms of age-related cataract, mild, of both eyes (8/14/2021), GERD (gastroesophageal reflux disease), High cholesterol, and Peripheral arterial disease (H).    He has no past medical history of Amblyopia, Complication of anesthesia, Diabetes (H), Diabetic retinopathy associated with diabetes mellitus " due to underlying condition (H), Glaucoma (increased eye pressure), Hypertension, Macular degeneration, Malignant hyperthermia, Retinal detachment, Strabismus, or Uveitis.  Patient Active Problem List   Diagnosis    CARDIOVASCULAR SCREENING; LDL GOAL LESS THAN 100    Choroidal nevus of left eye    SNHL (sensory-neural hearing loss), asymmetrical    Bilateral popliteal artery aneurysm (H)    Vitreomacular traction syndrome of right eye    Combined forms of age-related cataract, mild, of both eyes       Surgical Hx:  has a past surgical history that includes surgical history of - ; colectomy (8/05); and hernia repair, inguinal rt/lt.    Medications:   Current Outpatient Medications:     ALPRAZolam (XANAX) 0.5 MG tablet, Take 0.5 mg by mouth, Disp: , Rfl:     ASPIRIN 81 MG OR TABS, 1 TABLET DAILY, Disp: , Rfl:     CYCLOBENZAPRINE HCL PO, , Disp: , Rfl:     glucosamine-chondroitin 500-400 MG CAPS per capsule, Take 1 capsule by mouth daily, Disp: , Rfl:     OMEPRAZOLE PO, Take  by mouth., Disp: , Rfl:     SIMVASTATIN 80 MG OR TABS, 1 TABLET EVERY EVENING, Disp: , Rfl:     Allergies:   Allergies   Allergen Reactions    Buspirone Other (See Comments)     Feels like he is going to jump out of his skin    Hydrocodone Nausea and Vomiting and Nausea     Other reaction(s): Sweating, Nausea  Nausea and sweats      Oxycodone Other (See Comments)     Tylenol/Acetaminophen is OK.  Sweating, pins and needles.  Nightmares    Oxycodone-Acetaminophen Nausea and Vomiting     Other reaction(s): Finding of vomiting       Social Hx: retired.  reports that he quit smoking about 18 years ago. His smoking use included cigars. He has never used smokeless tobacco. He reports current alcohol use. He reports that he does not use drugs.    Family Hx: family history includes Alcohol/Drug in his father and mother; Arthritis in his father and mother; Asthma in an other family member; Breast Cancer in his mother; Cancer in his brother; Cancer -  "colorectal in an other family member; Cardiovascular in his father; Depression in an other family member; Gastrointestinal Disease in his brother and mother; Gynecology in his mother; Hypertension in his father; Lipids in his father; Osteoporosis in his mother; Psychotic Disorder in an other family member; Respiratory in his mother; Thyroid Disease in his sister and another family member.    REVIEW OF SYSTEMS:  10 point ROS neg other than the symptoms noted above in the HPI and PAST MEDICAL HISTORY. Notables,  CONSTITUTIONAL:NEGATIVE for fever, chills, change in weight  INTEGUMENTARY/SKIN: NEGATIVE for worrisome rashes, moles or lesions  MUSCULOSKELETAL:See HPI above  NEURO: NEGATIVE for weakness, dizziness or paresthesias    PHYSICAL EXAM:  /78   Pulse 72   Ht 1.905 m (6' 3\")   BMI 28.17 kg/m     GENERAL APPEARANCE: healthy, alert, no distress; accompanied by his wife.  SKIN: no suspicious lesions or rashes  NEURO: decreased strength and tone, mentation intact and speech normal  PSYCH:  mentation appears normal and affect normal  RESPIRATORY: No increased work of breathing.  LYMPH: no palpable inguinal lymph nodes.    BILATERAL LOWER EXTREMITIES:  Gait: hunched, shuffled.   Neutral to slight varus alignment.  No gross deformities or masses.  No Quad atrophy, strength normal.decreased sensation bilateral feet, left inner leg/thigh from prior surgery.  Intact EHL, EDL, TA, FHL, GS, quadriceps hamstrings and hip flexors  Toes warm and well perfused, brisk capillary refill. Palpable 2+ dp pulses.  Bilateral calf soft and nttp or squeeze.  DTRs: achilles 2+, patella 2+.  Edema: trace  Leg lengths: grossly symmetric at medial malleolus.      BACK EXAM  Lumbar range of motion: decreased, causes low back pain.    LEFT HIP EXAM:      Palpation: Tender:   mid and left low back, SI joint. Buttock.  Strength:  4/5  Special tests:  Irritability (flexion/adduction/internal rotation) severe positive.  Hip range of " "motion: very limited internal rotation / external rotation; flexion 50 degrees.      RIGHT HIP EXAM:      Palpation: Tender:   mid and right  low back, SI joint. Buttock.  Strength:  4/5  Special tests:  Irritability (flexion/adduction/internal rotation) severe positive.  Hip range of motion: very limited internal rotation / external rotation; flexion 60 degrees.      X-RAY: AP pelvis and AP/Lateral views bilateral hip from 9/19/2023 were reviewed in clinic today. No obvious fractures or  dislocations. Advanced degenerative changes of both hip joints. There is some irregularity traversing the left subcapital region which may represent superimposed irregularity of the posterior acetabulum but is difficult to exclude a nondisplaced fracture and CT is  recommended. Pelvis negative for fracture.        Impression: 67yo male with chronic bilateral hip pain, advanced primary osteoarthritis.    Plan:   * discussed pain in groin/hip likely from advanced hip arthritis. This is wearing of the cartilage within the hip joint either due to normal \"wear and tear\" or following an injury. Any low back / buttock / radiating pain likely coming from the low back.  I don't think there is a fracture.    *  treatment options for hip arthritis and pain include: do nothing, NSAIDS, activity modification, Physical Therapy, injections, total hip arthroplasty. Risks and benefits of each discussed.   * did discuss patient is a candidate for total hip arthroplasty, and offered total hip arthroplasty to patient. Total hip arthroplasty will only attempt to provide pain relief from hip related arthritis only and not any pain from the low back. Any low back pain likely to continue.  *  Discussed risks of surgery include, but not limited to: bleeding, infection, pain, scar, damage to adjacent structures (e.g. Nerves, blood vessels, bone, cartilage), temporary or permanent nerve damage, recurrence of symptoms, implant dislocation, implant failure, " "implant infection, unequal limb lengths, stiffness, need for further surgery, blood clots, pulmonary embolism, risks of anesthesia, and death.    * understanding the risks of surgery, as a quality of life decision, patient would like to proceed with surgery: LEFT total hip arthroplasty.  * will arrange LEFT total hip arthroplasty at a mutual convenience in the near future    I did express my concerns regarding postoperative pain control, given his \"allergies\" to hydrocodone, oxycodone, etc. This can make postoperative management challenging. He's likely going to have to endure more pain as a result.  I thought maybe acetaminophen, ibuprofen, tramadol perhaps. Otherwise try to symptomatically treat side effects to opioids.    * discussed patient and their  will plan hospitalization overnight with discharge home the next morning, daily physical therapy starting either the day of or day after surgery.  * will plan postoperative deep vein thrombosis prophylaxis x4 weeks.  Additionally, graduated compression stockings x4 weeks, and SCDs while in the hospital.  * postoperative pain control will be oral medications upon discharge from hospital  * postoperative Physical Therapy will be discussed, if needed, at first postoperative visit at 2 weeks  * patient will need longterm (at least 2 years) prophylactic antibiotics use with dental procedures or other invasive procedures (2 g amoxicilin or  2g Keflex or 500mg azithromycin or clarithromycin or 100mg doxycycline) 30-60 minutes prior to dental procedures.  * patient will need preoperative H+P prior to surgery from PCP.  * will see patient 2 weeks postoperative, sooner as needed. Will obtain lowset AP pelvis and lateral right hip xray at that time.     Daniel Hammonds M.D., M.S.  Dept. of Orthopaedic Surgery  [facility]         " FREE:[LAST:[sol],FIRST:[santiago],PHONE:[(   )    -],FAX:[(   )    -]]

## 2023-09-29 ENCOUNTER — TELEPHONE (OUTPATIENT)
Dept: SURGERY | Facility: CLINIC | Age: 68
End: 2023-09-29

## 2023-09-29 ENCOUNTER — OFFICE VISIT (OUTPATIENT)
Dept: ORTHOPEDICS | Facility: CLINIC | Age: 68
End: 2023-09-29
Payer: COMMERCIAL

## 2023-09-29 VITALS
HEART RATE: 72 BPM | BODY MASS INDEX: 28.17 KG/M2 | DIASTOLIC BLOOD PRESSURE: 78 MMHG | HEIGHT: 75 IN | SYSTOLIC BLOOD PRESSURE: 130 MMHG

## 2023-09-29 DIAGNOSIS — M16.0 PRIMARY OSTEOARTHRITIS OF HIPS, BILATERAL: Primary | ICD-10-CM

## 2023-09-29 PROBLEM — M19.90 OSTEOARTHROSIS: Status: ACTIVE | Noted: 2023-09-29

## 2023-09-29 PROCEDURE — 99214 OFFICE O/P EST MOD 30 MIN: CPT | Performed by: ORTHOPAEDIC SURGERY

## 2023-09-29 RX ORDER — TRANEXAMIC ACID 650 MG/1
1950 TABLET ORAL ONCE
Status: CANCELLED | OUTPATIENT
Start: 2023-09-29 | End: 2023-09-29

## 2023-09-29 RX ORDER — ACETAMINOPHEN 325 MG/1
975 TABLET ORAL ONCE
Status: CANCELLED | OUTPATIENT
Start: 2023-09-29 | End: 2023-09-29

## 2023-09-29 NOTE — LETTER
"    9/29/2023         RE: Paulette Dorsey  [address]   [place] [facility] [place] [facility] [address]        Dear Colleague,    Thank you for referring your patient, Paulette Dorsey, to the [facility]. Please see a copy of my visit note below.    Chief Complaint:   Chief Complaint   Patient presents with     Left Hip - Pain     Discuss Surgery options     Right Hip - Pain       HISTORY OF PRESENT ILLNESS    Paulette Dorsey is a 68 year old male seen for evaluation of ongoing bilateral hip pain with no known injury.   Pain has been present for 5 years or more. Left more than right.  Locates pain to the front of the hips, groin, but sometimes along the outer aspect. Mild pain at rest, worse with activities, standing, walking, sit to stand. Does have pain at night rolling side to side. Denies perceived limb length inequality.    Seen by Dr. Denise Lai, 9/19/2023, for the same. Referred to discuss anterior hip replacement.    Treatment has been cortisone injections 4/2023 with a couple of weeks relief. Therapy a few years back. Takes over the counter ibuprofen, aspirin. He doesn't tolerate opioid pain medications.    Had total hip arthroplasty scheduled in August, but cancelled (at [facility]). He and surgeon \"didn't connect\", he says     Chronic low back pain since teenager.  Left lower extremity numbness and tingling from previous bypass surgery. Bottoms of feeet are numb from chemotherapy.      Present symptoms: pain, stiffness.    Pain severity: 7/10  Pain quality: aching and sharp  Frequency of symptoms: are constant  Exacerbating Factors: weight bearing, stairs, [id number], in and out of car, donning shoes/socks.  Relieving Factors: rest, sitting  Night Pain: Yes  Pain while at rest: Yes   Associated numbness or tingling:  unrelated but toes have numbness and tingling.        Other PMH:  has a past medical history of Arthritis, Colon cancer (H) (8/05), Combined forms of age-related cataract, mild, of both " eyes (8/14/2021), GERD (gastroesophageal reflux disease), High cholesterol, and Peripheral arterial disease (H).    He has no past medical history of Amblyopia, Complication of anesthesia, Diabetes (H), Diabetic retinopathy associated with diabetes mellitus due to underlying condition (H), Glaucoma (increased eye pressure), Hypertension, Macular degeneration, Malignant hyperthermia, Retinal detachment, Strabismus, or Uveitis.  Patient Active Problem List   Diagnosis     CARDIOVASCULAR SCREENING; LDL GOAL LESS THAN 100     Choroidal nevus of left eye     SNHL (sensory-neural hearing loss), asymmetrical     Bilateral popliteal artery aneurysm (H)     Vitreomacular traction syndrome of right eye     Combined forms of age-related cataract, mild, of both eyes       Surgical Hx:  has a past surgical history that includes surgical history of - ; colectomy (8/05); and hernia repair, inguinal rt/lt.    Medications:   Current Outpatient Medications:      ALPRAZolam (XANAX) 0.5 MG tablet, Take 0.5 mg by mouth, Disp: , Rfl:      ASPIRIN 81 MG OR TABS, 1 TABLET DAILY, Disp: , Rfl:      CYCLOBENZAPRINE HCL PO, , Disp: , Rfl:      glucosamine-chondroitin 500-400 MG CAPS per capsule, Take 1 capsule by mouth daily, Disp: , Rfl:      OMEPRAZOLE PO, Take  by mouth., Disp: , Rfl:      SIMVASTATIN 80 MG OR TABS, 1 TABLET EVERY EVENING, Disp: , Rfl:     Allergies:   Allergies   Allergen Reactions     Buspirone Other (See Comments)     Feels like he is going to jump out of his skin     Hydrocodone Nausea and Vomiting and Nausea     Other reaction(s): Sweating, Nausea  Nausea and sweats       Oxycodone Other (See Comments)     Tylenol/Acetaminophen is OK.  Sweating, pins and needles.  Nightmares     Oxycodone-Acetaminophen Nausea and Vomiting     Other reaction(s): Finding of vomiting       Social Hx: retired.  reports that he quit smoking about 18 years ago. His smoking use included cigars. He has never used smokeless tobacco. He reports  "current alcohol use. He reports that he does not use drugs.    Family Hx: family history includes Alcohol/Drug in his father and mother; Arthritis in his father and mother; Asthma in an other family member; Breast Cancer in his mother; Cancer in his brother; Cancer - colorectal in an other family member; Cardiovascular in his father; Depression in an other family member; Gastrointestinal Disease in his brother and mother; Gynecology in his mother; Hypertension in his father; Lipids in his father; Osteoporosis in his mother; Psychotic Disorder in an other family member; Respiratory in his mother; Thyroid Disease in his sister and another family member.    REVIEW OF SYSTEMS:  10 point ROS neg other than the symptoms noted above in the HPI and PAST MEDICAL HISTORY. Notables,  CONSTITUTIONAL:NEGATIVE for fever, chills, change in weight  INTEGUMENTARY/SKIN: NEGATIVE for worrisome rashes, moles or lesions  MUSCULOSKELETAL:See HPI above  NEURO: NEGATIVE for weakness, dizziness or paresthesias    PHYSICAL EXAM:  /78   Pulse 72   Ht 1.905 m (6' 3\")   BMI 28.17 kg/m     GENERAL APPEARANCE: healthy, alert, no distress; accompanied by his wife.  SKIN: no suspicious lesions or rashes  NEURO: decreased strength and tone, mentation intact and speech normal  PSYCH:  mentation appears normal and affect normal  RESPIRATORY: No increased work of breathing.  LYMPH: no palpable inguinal lymph nodes.    BILATERAL LOWER EXTREMITIES:  Gait: hunched, shuffled.   Neutral to slight varus alignment.  No gross deformities or masses.  No Quad atrophy, strength normal.decreased sensation bilateral feet, left inner leg/thigh from prior surgery.  Intact EHL, EDL, TA, FHL, GS, quadriceps hamstrings and hip flexors  Toes warm and well perfused, brisk capillary refill. Palpable 2+ dp pulses.  Bilateral calf soft and nttp or squeeze.  DTRs: achilles 2+, patella 2+.  Edema: trace  Leg lengths: grossly symmetric at medial malleolus.      BACK " "EXAM  Lumbar range of motion: decreased, causes low back pain.    LEFT HIP EXAM:      Palpation: Tender:   mid and left low back, SI joint. Buttock.  Strength:  4/5  Special tests:  Irritability (flexion/adduction/internal rotation) severe positive.  Hip range of motion: very limited internal rotation / external rotation; flexion 50 degrees.      RIGHT HIP EXAM:      Palpation: Tender:   mid and right  low back, SI joint. Buttock.  Strength:  4/5  Special tests:  Irritability (flexion/adduction/internal rotation) severe positive.  Hip range of motion: very limited internal rotation / external rotation; flexion 60 degrees.      X-RAY: AP pelvis and AP/Lateral views bilateral hip from 9/19/2023 were reviewed in clinic today. No obvious fractures or  dislocations. Advanced degenerative changes of both hip joints. There is some irregularity traversing the left subcapital region which may represent superimposed irregularity of the posterior acetabulum but is difficult to exclude a nondisplaced fracture and CT is  recommended. Pelvis negative for fracture.        Impression: 67yo male with chronic bilateral hip pain, advanced primary osteoarthritis.    Plan:   * discussed pain in groin/hip likely from advanced hip arthritis. This is wearing of the cartilage within the hip joint either due to normal \"wear and tear\" or following an injury. Any low back / buttock / radiating pain likely coming from the low back.  I don't think there is a fracture.    *  treatment options for hip arthritis and pain include: do nothing, NSAIDS, activity modification, Physical Therapy, injections, total hip arthroplasty. Risks and benefits of each discussed.   * did discuss patient is a candidate for total hip arthroplasty, and offered total hip arthroplasty to patient. Total hip arthroplasty will only attempt to provide pain relief from hip related arthritis only and not any pain from the low back. Any low back pain likely to continue.  *  " "Discussed risks of surgery include, but not limited to: bleeding, infection, pain, scar, damage to adjacent structures (e.g. Nerves, blood vessels, bone, cartilage), temporary or permanent nerve damage, recurrence of symptoms, implant dislocation, implant failure, implant infection, unequal limb lengths, stiffness, need for further surgery, blood clots, pulmonary embolism, risks of anesthesia, and death.    * understanding the risks of surgery, as a quality of life decision, patient would like to proceed with surgery: LEFT total hip arthroplasty.  * will arrange LEFT total hip arthroplasty at a mutual convenience in the near future    I did express my concerns regarding postoperative pain control, given his \"allergies\" to hydrocodone, oxycodone, etc. This can make postoperative management challenging. He's likely going to have to endure more pain as a result.  I thought maybe acetaminophen, ibuprofen, tramadol perhaps. Otherwise try to symptomatically treat side effects to opioids.    * discussed patient and their  will plan hospitalization overnight with discharge home the next morning, daily physical therapy starting either the day of or day after surgery.  * will plan postoperative deep vein thrombosis prophylaxis x4 weeks.  Additionally, graduated compression stockings x4 weeks, and SCDs while in the hospital.  * postoperative pain control will be oral medications upon discharge from hospital  * postoperative Physical Therapy will be discussed, if needed, at first postoperative visit at 2 weeks  * patient will need longterm (at least 2 years) prophylactic antibiotics use with dental procedures or other invasive procedures (2 g amoxicilin or  2g Keflex or 500mg azithromycin or clarithromycin or 100mg doxycycline) 30-60 minutes prior to dental procedures.  * patient will need preoperative H+P prior to surgery from PCP.  * will see patient 2 weeks postoperative, sooner as needed. Will obtain lowset AP pelvis " and lateral right hip xray at that time.     Daniel Hammonds M.D., M.S.  Dept. of Orthopaedic Surgery  [facility]             Again, thank you for allowing me to participate in the care of your patient.        Sincerely,        Daniel Hammonds MD

## 2024-01-02 ENCOUNTER — PATIENT OUTREACH (OUTPATIENT)
Dept: GERIATRIC MEDICINE | Facility: CLINIC | Age: 69
End: 2024-01-02
Payer: COMMERCIAL

## 2024-01-02 NOTE — Clinical Note
This member is on the state program MSHO/MSC+. As the Care Coordinator we are required to notify the Physician when a member refuses an annual assessment for this program. If you have further questions please contact me. Radha Fernandez RN [facility] Care Coordinator [phone]

## 2024-01-02 NOTE — PROGRESS NOTES
"[facility] Care Coordination Contact    Per CC, mailed client a \"Refusal of Home Visit\" letter.    Cortney Jalloh  Care Management Specialist   [facility]   [phone]    "

## 2024-01-02 NOTE — PROGRESS NOTES
[facility] Care Coordination Contact      [facility] Refusal Telephone Assessment    Member refused home visit HRA on 01/02/2024 (reason: No concerns with his health or questions about his benefits).    ER visits: No  Hospitalizations: No  Health concerns: None  Falls/Injuries: No  ADL/IADL Dependencies: none        Member currently receiving the following home care services:   none  Member currently receiving the following community resources:  none  Informal support(s):  Spouse    Advanced Care Planning discussion, complete code section.    [facility] sponsored benefits: Shared information re: Silver Sneakers/gym memberships, ASA, Calcium +D.    Follow-Up Plan: Member informed of future contact, plan to f/u with member with a 6 month telephone assessment and offer a home visit.  Contact information shared with member and family, encouraged member to call with any questions or concerns at any time.    This CC note routed to PCP, Zach Ochoa.    Radha Fernandez RN PHN  Care Coordinator  [facility]  [phone]

## 2024-01-02 NOTE — LETTER
January 2, 2024    PAULETTE QUIROGA  [address]  [place] [address]        Dear Paulette:    As a member of [facility], we offer a health risk assessment at no cost to you. I know you don't want to have the assessment right now. If you change your mind, please call me at the number below.    Who performs the health risk assessment?  A [facility] Care Coordinator performs the assessment. Our Care Coordinators can also help you understand your benefits. They can tell you about services to aid you at home, such as managing your care with your doctors if your health worsens.    Our Care Coordinators are here for you if you need:  Support for activities you used to do by yourself (including making meals, bathing, and paying bills)  Equipment for bathroom or home safety  Help finding a new place to live  Information on staying healthy, preventing falls and immunizations    Questions?  If you have questions, or you would like to do the assessment, call me at [phone]. TTY users call [phone]. I'm here from 8am to 5pm. I may reach out to you again soon.      Sincerely,          Radha Fernandez RN, PHN  [phone]  [email or web address]      [employer]    <[id number] accepted    [id number] (12/2021)  [id number]>

## 2024-02-15 ENCOUNTER — TRANSFERRED RECORDS (OUTPATIENT)
Dept: HEALTH INFORMATION MANAGEMENT | Facility: CLINIC | Age: 69
End: 2024-02-15
Payer: COMMERCIAL

## 2024-02-23 ENCOUNTER — PATIENT OUTREACH (OUTPATIENT)
Dept: GERIATRIC MEDICINE | Facility: CLINIC | Age: 69
End: 2024-02-23
Payer: COMMERCIAL

## 2024-02-23 NOTE — PROGRESS NOTES
[facility] Care Coordination Contact    Internal CC change effective 3/1/2024.  Mailed member CC Change letter.  Additional tasks to be completed by CMS include: update database & EPIC, enter CC Change in MMIS, and move member file.      Arlyn Stein  Case Management Specialist  [facility]  [phone]

## 2024-02-23 NOTE — LETTER
February 23, 2024    PAULETTE QUIROGA  [address]  [place] [facility] [address]      Dear Paulette:    As a member of [facility] ([facility]) ([facility]), you are provided a care coordinator. I will be your new care coordinator as of 3/1/2024. I will be calling you soon to see how you are doing and determine your needs.    If you have any questions, please feel free to call me at [phone]. If you reach my voice mail, please leave a message and your phone number. If you are hearing impaired, please call the [place] Relay at 454 or [phone] ([email or web address] relay service).    I look forward to speaking with you soon.    Sincerely,      Brandy Harris RN, BSN, N  [phone]  [email or web address] is a health plan that contracts with both Medicare and the [place] Medical Assistance (Medicaid) program to provide benefits of both programs to enrollees. Enrollment in [facility] depends on contract renewal.      [facility]  [id number] DHS Approved ([id number])  [id number] (11/18)

## 2024-02-27 ENCOUNTER — TRANSFERRED RECORDS (OUTPATIENT)
Dept: HEALTH INFORMATION MANAGEMENT | Facility: CLINIC | Age: 69
End: 2024-02-27
Payer: COMMERCIAL

## 2024-03-01 ENCOUNTER — PATIENT OUTREACH (OUTPATIENT)
Dept: GERIATRIC MEDICINE | Facility: CLINIC | Age: 69
End: 2024-03-01
Payer: COMMERCIAL

## 2024-03-01 NOTE — PROGRESS NOTES
[facility] Care Coordination Contact    Care coordinator spoke to member's spouse and introduced self as new care coordinator.  Spouse reports that they received letter with care coordinator contact information.      Brandy Harris RN   [facility]  [phone]

## 2024-03-14 ENCOUNTER — OFFICE VISIT (OUTPATIENT)
Dept: FAMILY MEDICINE | Facility: CLINIC | Age: 69
End: 2024-03-14
Payer: COMMERCIAL

## 2024-03-14 VITALS
SYSTOLIC BLOOD PRESSURE: 134 MMHG | HEIGHT: 75 IN | DIASTOLIC BLOOD PRESSURE: 89 MMHG | WEIGHT: 229 LBS | TEMPERATURE: 97.3 F | BODY MASS INDEX: 28.47 KG/M2 | OXYGEN SATURATION: 95 % | RESPIRATION RATE: 16 BRPM | HEART RATE: 74 BPM

## 2024-03-14 DIAGNOSIS — R51.9 LEFT FACIAL PAIN: Primary | ICD-10-CM

## 2024-03-14 DIAGNOSIS — R68.84 MANDIBULAR PAIN: ICD-10-CM

## 2024-03-14 PROCEDURE — 99214 OFFICE O/P EST MOD 30 MIN: CPT | Performed by: FAMILY MEDICINE

## 2024-03-14 NOTE — PROGRESS NOTES
"  Assessment & Plan   Problem List Items Addressed This Visit    None  Visit Diagnoses       Left facial pain    -  Primary    Relevant Medications    amoxicillin-clavulanate (AUGMENTIN) 875-125 MG tablet    Other Relevant Orders    Oral Surgery Referral    Mandibular pain        Relevant Medications    amoxicillin-clavulanate (AUGMENTIN) 875-125 MG tablet    Other Relevant Orders    Oral Surgery Referral           Differential includes tooth infection - will trial antibiotics, appreciate oral surgeon input   I encouraged him to find a new dentist for another evaluation also     BMI  Estimated body mass index is 28.62 kg/m  as calculated from the following:    Height as of this encounter: 1.905 m (6' 3\").    Weight as of this encounter: 103.9 kg (229 lb).         Subjective   Sabrina Rivera is a 68 year old, presenting for the following health issues:  Ear Problem and Throat Problem        3/14/2024     3:03 PM   Additional Questions   Roomed by Nadine MA CMA   Accompanied by Kiko     History of Present Illness       Reason for visit:  Pain left side of face  Symptom onset:  More than a month  Symptoms include:  Pain left side of face, ear, throat  Symptom intensity:  Severe  Symptom progression:  Staying the same      Dental procedure back in August left mandibular molar (second perhaps?), and now he has been suffering from pain in the left side of the face. He did not return to that dentist.        Objective    /89 (BP Location: Right arm, Patient Position: Chair, Cuff Size: Adult Large)   Pulse 74   Temp 97.3  F (36.3  C) (Temporal)   Resp 16   Ht 1.905 m (6' 3\")   Wt 103.9 kg (229 lb)   SpO2 95%   BMI 28.62 kg/m    Body mass index is 28.62 kg/m .  Physical Exam   GENERAL: alert and no distress  NECK: no adenopathy, no asymmetry, masses, or scars  Mouth - no pain at the left parotid, no obvious abscess, no gross tumors noted  Ears: normal TMs        Signed Electronically by: ARIEL VEE DO    "

## 2024-03-21 ENCOUNTER — OFFICE VISIT (OUTPATIENT)
Dept: FAMILY MEDICINE | Facility: CLINIC | Age: 69
End: 2024-03-21
Payer: COMMERCIAL

## 2024-03-21 VITALS
OXYGEN SATURATION: 97 % | HEART RATE: 85 BPM | RESPIRATION RATE: 20 BRPM | SYSTOLIC BLOOD PRESSURE: 116 MMHG | HEIGHT: 65 IN | BODY MASS INDEX: 38.19 KG/M2 | WEIGHT: 229.2 LBS | TEMPERATURE: 97.7 F | DIASTOLIC BLOOD PRESSURE: 72 MMHG

## 2024-03-21 DIAGNOSIS — M26.609 TMJ (TEMPOROMANDIBULAR JOINT SYNDROME): Primary | ICD-10-CM

## 2024-03-21 DIAGNOSIS — K04.7 ABSCESSED TOOTH: ICD-10-CM

## 2024-03-21 DIAGNOSIS — R51.9 ACUTE NONINTRACTABLE HEADACHE, UNSPECIFIED HEADACHE TYPE: ICD-10-CM

## 2024-03-21 DIAGNOSIS — I72.4 BILATERAL POPLITEAL ARTERY ANEURYSM (H): ICD-10-CM

## 2024-03-21 DIAGNOSIS — C18.9 MALIGNANT NEOPLASM OF COLON, UNSPECIFIED PART OF COLON (H): ICD-10-CM

## 2024-03-21 PROCEDURE — 99214 OFFICE O/P EST MOD 30 MIN: CPT | Performed by: PHYSICIAN ASSISTANT

## 2024-03-21 RX ORDER — PREDNISONE 10 MG/1
TABLET ORAL
Qty: 42 TABLET | Refills: 0 | Status: SHIPPED | OUTPATIENT
Start: 2024-03-21

## 2024-03-21 RX ORDER — AMOXICILLIN 875 MG
875 TABLET ORAL 2 TIMES DAILY
Qty: 20 TABLET | Refills: 0 | Status: SHIPPED | OUTPATIENT
Start: 2024-03-21 | End: 2024-03-31

## 2024-03-21 ASSESSMENT — PAIN SCALES - GENERAL: PAINLEVEL: MODERATE PAIN (5)

## 2024-03-21 NOTE — PROGRESS NOTES
"    Subjective   Sabrina Rivera is a 68 year old, presenting for the following health issues:  RECHECK (Left sided facial pain /1 month/Went to [facility] on 3/14/24) and Health Maintenance (Patient is not fasting, [facility])        3/21/2024     2:57 PM   Additional Questions   Roomed by Miriam Desai CMA   Accompanied by WIfe,  Valentine         3/21/2024     2:57 PM   Patient Reported Additional Medications   Patient reports taking the following new medications none     HPI     Recheck Facial Pain  - was seen at [facility] 3/14/24  - seems a little better  Pain around left lower molar. Radiates to ear and throat. No facial swelling . Pain with chewing. No fevers. No chest pain . No vision changes.   Some  hot or cold sensitivity  S/p colon ca in 2005 . Couple of polyps removed during most recent colonoscopy  History of bilateral popliteal aa aneurysms. Left side repaired. Right sided followed by vascular.      Review of Systems  Constitutional, neuro, ENT, endocrine, pulmonary, cardiac, gastrointestinal, genitourinary, musculoskeletal, integument and psychiatric systems are negative, except as otherwise noted.      Objective    /72   Pulse 85   Temp 97.7  F (36.5  C) (Temporal)   Resp 20   Ht 1.638 m (5' 4.5\")   Wt 104 kg (229 lb 3.2 oz)   SpO2 97%   BMI 38.73 kg/m    Body mass index is 38.73 kg/m .  Physical Exam   Eye exam - right eye normal lid, conjunctiva, cornea, pupil and fundus, left eye normal lid, conjunctiva, cornea, pupil and fundus.  ENT exam reveals - ENT exam normal, no neck nodes or sinus tenderness.  CHEST:chest clear to IPPA, no tachypnea, retractions or cyanosis, and S1, S2 normal, no murmur, no gallop, rate regular.  No carotid bruits.  Left tmj pain with palpation and opening of jaw.  No gingival swelling or tooth tenderness.  No rash.     Sabrina Rivera was seen today for recheck and health maintenance.    Diagnoses and all orders for this visit:    TMJ " (temporomandibular joint syndrome)  -     predniSONE (DELTASONE) 10 MG tablet; Take 60 mg days 1 and 2, 50 mg days 3 and 4, 40 mg days 5 and 6, 30 mg days 7 and 8, 20 mg days 9 and 10, 10 mg days 11 and 12    Malignant neoplasm of colon, unspecified part of colon (H)    Bilateral popliteal artery aneurysm (H24)    Abscessed tooth  -     amoxicillin (AMOXIL) 875 MG tablet; Take 1 tablet (875 mg) by mouth 2 times daily for 10 days    Acute nonintractable headache, unspecified headache type  -     CTA Head Neck with Contrast; Future    Other orders  -     REVIEW OF HEALTH MAINTENANCE PROTOCOL ORDERS      F/up with dentist re: tooth pain.   Advised supportive and symptomatic treatment.  Follow up with Provider - if condition persists or worsens.     Signed Electronically by: Zach Ochoa PA-C

## 2024-03-24 ENCOUNTER — NURSE TRIAGE (OUTPATIENT)
Dept: NURSING | Facility: CLINIC | Age: 69
End: 2024-03-24
Payer: COMMERCIAL

## 2024-03-24 NOTE — TELEPHONE ENCOUNTER
"Pt gave verbal permission to speak with wife about medical care     Pt is taking Prednisone and Amoxicillin - for possible tooth infection     Pt is very irritable, thirsty and having irregular heart beats at times    Pt was dizzy and sweating this am and passed out on the floor - 911 was phoned and came out to evaluate - EMT reported to wife that vital signs were good and ambulance did not transport pt. Pt was advised by EMT to be seen at ED now     Pt did not go to ED this am per EMT instruction and is phoning stating that he is still having irregular heartbeats at times    Pt is currently not feeling dizzy, no sweating, no chest pain, no breathing difficulty at this time     Per disposition: Go to ED Now (Or PCP Triage)     Pts wife is driving pt to ED now for evaluation at [facility]     Care advice given per protocol and when to call back. Pt verbalized understanding and agrees to plan of care.    Nazia Maurer RN  [facility] Nurse Advisor  12:57 PM 3/24/2024        Reason for Disposition   Extra heartbeats, irregular heart beating, or heart is beating very fast  (i.e., \"palpitations\")    Additional Information   Negative: SEVERE difficulty breathing (e.g., struggling for each breath, speaks in single words)   Negative: [1] Difficulty breathing or swallowing AND [2] started suddenly after medicine, an allergic food or bee sting   Negative: Shock suspected (e.g., cold/pale/clammy skin, too weak to stand, low BP, rapid pulse)   Negative: Difficult to awaken or acting confused (e.g., disoriented, slurred speech)   Negative: [1] Weakness (i.e., paralysis, loss of muscle strength) of the face, arm or leg on one side of the body AND [2] sudden onset AND [3] present now   Negative: [1] Numbness (i.e., loss of sensation) of the face, arm or leg on one side of the body AND [2] sudden onset AND [3] present now   Negative: [1] Loss of speech or garbled speech AND [2] sudden onset AND [3] present now   Negative: " "Overdose (accidental or intentional) of medications   Negative: [1] Fainted > 15 minutes ago AND [2] still feels too weak or dizzy to stand   Negative: Heart beating < 50 beats per minute OR > 140 beats per minute   Negative: Sounds like a life-threatening emergency to the triager   Negative: Chest pain   Negative: Rectal bleeding, bloody stool, or tarry-black stool   Negative: [1] Vomiting AND [2] contains red blood or black (\"coffee ground\") material   Negative: Vomiting is main symptom   Negative: Diarrhea is main symptom   Negative: Headache is main symptom   Negative: Patient states that they are having an anxiety or panic attack   Negative: Dizziness from low blood sugar (i.e., < 60 mg/dl or 3.5 mmol/l)   Negative: Dizziness is described as a spinning sensation (i.e., vertigo)   Negative: Heat exhaustion suspected (i.e., dehydration from heat exposure)   Negative: Difficulty breathing   Negative: SEVERE dizziness (e.g., unable to stand, requires support to walk, feels like passing out now)    Protocols used: Dizziness - [id number]    "

## 2024-04-03 ENCOUNTER — ANCILLARY PROCEDURE (OUTPATIENT)
Dept: CT IMAGING | Facility: CLINIC | Age: 69
End: 2024-04-03
Attending: PHYSICIAN ASSISTANT
Payer: COMMERCIAL

## 2024-04-03 DIAGNOSIS — R51.9 ACUTE NONINTRACTABLE HEADACHE, UNSPECIFIED HEADACHE TYPE: ICD-10-CM

## 2024-04-03 PROCEDURE — 70496 CT ANGIOGRAPHY HEAD: CPT | Mod: TC | Performed by: RADIOLOGY

## 2024-04-03 PROCEDURE — 70498 CT ANGIOGRAPHY NECK: CPT | Mod: TC | Performed by: RADIOLOGY

## 2024-04-03 RX ORDER — IOPAMIDOL 755 MG/ML
100 INJECTION, SOLUTION INTRAVASCULAR ONCE
Status: COMPLETED | OUTPATIENT
Start: 2024-04-03 | End: 2024-04-03

## 2024-04-03 RX ADMIN — Medication 97 ML: at 12:30

## 2024-04-03 RX ADMIN — IOPAMIDOL 67 ML: 755 INJECTION, SOLUTION INTRAVASCULAR at 12:30

## 2024-05-14 ENCOUNTER — TELEPHONE (OUTPATIENT)
Dept: FAMILY MEDICINE | Facility: CLINIC | Age: 69
End: 2024-05-14
Payer: COMMERCIAL

## 2024-05-14 NOTE — TELEPHONE ENCOUNTER
Patient Quality Outreach    Patient is due for the following:   Physical Annual Wellness Visit    Next Steps:   Schedule a Annual Wellness Visit    Type of outreach:    Sent [employer] message.      Questions for provider review:    None           Miriam Desai

## 2024-06-16 ENCOUNTER — HEALTH MAINTENANCE LETTER (OUTPATIENT)
Age: 69
End: 2024-06-16

## 2024-06-19 ENCOUNTER — PATIENT OUTREACH (OUTPATIENT)
Dept: GERIATRIC MEDICINE | Facility: CLINIC | Age: 69
End: 2024-06-19
Payer: COMMERCIAL

## 2024-06-19 NOTE — PROGRESS NOTES
[facility] Care Coordination Contact      [facility] Six-Month Telephone Assessment    6 month telephone assessment completed on 6/19/24.  Care coordinator spoke to spouse Srinivasan.     ER visits: No  Hospitalizations: No  TCU stays: No  Significant health status changes: No  Falls/Injuries: No  ADL/IADL changes: No  Changes in services: No    Caregiver Assessment follow up:  N/A.  Care coordinator discussed that member is overdue for his annual medicare wellness exam with PCP Zach Ochoa.  Sp[facility] and that [facility] did have his annual visit [facility] not long ago which included lab work.  Spouse also reports that he has been following up with the dentist as directed by Zach Ochoa following office visit/antibiotics.  No new concerns reported by spouse.  Care coordinator explained role of care coordination through [employer] plan and that next follow up will be in 6 months.     Goals: See POC in chart for goal progress documentation.      Will see member in 6 months for an annual health risk assessment.   Encouraged member to call CC with any questions or concerns in the meantime.     Brandy Harris RN   [facility]  [phone]

## 2024-12-04 ENCOUNTER — PATIENT OUTREACH (OUTPATIENT)
Dept: GERIATRIC MEDICINE | Facility: CLINIC | Age: 69
End: 2024-12-04
Payer: COMMERCIAL

## 2024-12-04 NOTE — PROGRESS NOTES
[facility] Care Coordination Contact      [facility] Refusal Telephone Assessment    Member refused home visit HRA on 12/4/24 (reason: declines need).    ER visits: No  Hospitalizations: No  Health concerns: No concerns reported  Falls/Injuries: No falls reported  ADL/IADL Dependencies: No dependencies reported        Member currently receiving the following home care services:   No  Member currently receiving the following community resources:    Informal support(s):  No    Advanced Care Planning discussion, complete code section.    [facility] Health Plan sponsored benefits: Shared information re: Silver Sneakers/gym memberships, ASA, Calcium +D.    Follow-Up Plan: Member informed of future contact, plan to f/u with member with a 6 month telephone assessment and offer a home visit.  Contact information shared with member and family, encouraged member to call with any questions or concerns at any time.    This CC note routed to PCP, Zach Ochoa.    Brandy Harris RN   [facility]  [phone]

## 2024-12-04 NOTE — Clinical Note
BLANCA Reddy:  Sabrina Rivera declined his health risk assessment again this year reporting no need for home/community services at this time.   Thank you, MOHIT Nava CC

## 2025-01-13 ENCOUNTER — TELEPHONE (OUTPATIENT)
Dept: ORTHOPEDICS | Facility: CLINIC | Age: 70
End: 2025-01-13
Payer: COMMERCIAL

## 2025-01-13 NOTE — TELEPHONE ENCOUNTER
I do not see a consent on file to speak with anyone, including Srinivasan.  Called and spoke with patient.  His wife, Srinivasan, helped with the discussion as patient is hard of hearing and has a hard time on the telephone.    Patient last seen by Dr Hammonds on 9/29/23 and was recommended to schedule surgery.  He never did this, and now his pain is quite bad and he's wanting to schedule and also requesting pain pills.    Let him know he will need another appt to discuss with Dr Hammonds as it's been about a year and a half since he was last seen.  They have this set up already for next week.  Also told him that narcotic pain medications are prescribed very sparingly for post-op pain control.  If his pain is very bad right now he will have to seek care at the ED or check if his PCP would prescribe something for him.  He understands.  No further action needed.    Madyson Neville MSN, RN   [facility], 1/13/2025 2:22 PM

## 2025-01-13 NOTE — TELEPHONE ENCOUNTER
[facility] Call Center    Phone Message    May a detailed message be left on voicemail: yes     Reason for Call: Other: Patient is having hip pain. Pain level rated a 9/10. Would like a call back for medical advice and possible pain medication.     Action Taken: Other: Orthopedics    Travel Screening: Not Applicable     Date of Service:

## 2025-01-14 ASSESSMENT — HOOS JR
HOOS JR TOTAL INTERVAL SCORE: 25.1
WALKING ON UNEVEN SURFACE: SEVERE
GOING UP OR DOWN STAIRS: SEVERE
BENDING TO THE FLOOR TO PICK UP OBJECT: EXTREME
SITTING: SEVERE
RISING FROM SITTING: SEVERE
LYING IN BED (TURNING OVER, MAINTAINING HIP POSITION): EXTREME

## 2025-01-20 ENCOUNTER — ANCILLARY PROCEDURE (OUTPATIENT)
Dept: GENERAL RADIOLOGY | Facility: CLINIC | Age: 70
End: 2025-01-20
Attending: ORTHOPAEDIC SURGERY
Payer: COMMERCIAL

## 2025-01-20 ENCOUNTER — OFFICE VISIT (OUTPATIENT)
Dept: ORTHOPEDICS | Facility: CLINIC | Age: 70
End: 2025-01-20
Payer: COMMERCIAL

## 2025-01-20 ENCOUNTER — TELEPHONE (OUTPATIENT)
Dept: SURGERY | Facility: CLINIC | Age: 70
End: 2025-01-20

## 2025-01-20 VITALS
BODY MASS INDEX: 28.86 KG/M2 | WEIGHT: 237 LBS | SYSTOLIC BLOOD PRESSURE: 149 MMHG | HEIGHT: 76 IN | DIASTOLIC BLOOD PRESSURE: 77 MMHG

## 2025-01-20 DIAGNOSIS — M25.552 CHRONIC HIP PAIN, BILATERAL: ICD-10-CM

## 2025-01-20 DIAGNOSIS — G89.29 CHRONIC HIP PAIN, BILATERAL: ICD-10-CM

## 2025-01-20 DIAGNOSIS — M16.0 PRIMARY OSTEOARTHRITIS OF HIPS, BILATERAL: ICD-10-CM

## 2025-01-20 DIAGNOSIS — M25.551 CHRONIC HIP PAIN, BILATERAL: ICD-10-CM

## 2025-01-20 DIAGNOSIS — M16.0 PRIMARY OSTEOARTHRITIS OF HIPS, BILATERAL: Primary | ICD-10-CM

## 2025-01-20 PROCEDURE — 99214 OFFICE O/P EST MOD 30 MIN: CPT | Performed by: ORTHOPAEDIC SURGERY

## 2025-01-20 PROCEDURE — 73522 X-RAY EXAM HIPS BI 3-4 VIEWS: CPT | Mod: TC | Performed by: RADIOLOGY

## 2025-01-20 RX ORDER — TRANEXAMIC ACID 650 MG/1
1950 TABLET ORAL ONCE
OUTPATIENT
Start: 2025-01-20 | End: 2025-01-20

## 2025-01-20 ASSESSMENT — PAIN SCALES - GENERAL: PAINLEVEL_OUTOF10: MODERATE PAIN (6)

## 2025-01-20 NOTE — PROGRESS NOTES
"Chief Complaint:   Chief Complaint   Patient presents with    Left Hip - Pain    Right Hip - Pain     Pain in both hips, with R hip being more symptomatic.        HISTORY OF PRESENT ILLNESS    Paulette Dorsey is a 69 year old male seen for evaluation of ongoing bilateral hip pain with no known injury.   Right more painful than the left. Pain has been present for 6 years or more.  Seen for the same 9/2023 and at that time discussed total hip arthroplasty, but he never followed through. He'd like to discuss right hip replacement surgery.    Taking ibuprofen for pain.    Locates pain to the front of the hips, groin, but sometimes along the outer aspect. Mild pain at rest, worse with activities, standing, walking, sit to stand, bending, driving. Does have pain at night rolling side to side. Denies perceived limb length inequality.    Seen by Dr. Denise Lai, 9/19/2023, for the same.      Treatment has been cortisone injections 4/2023 with a couple of weeks relief. Therapy a few years back. Takes over the counter ibuprofen, aspirin. He doesn't tolerate opioid pain medications.    Had total hip arthroplasty scheduled in August 2023, but cancelled (at [facility]). He and surgeon \"didn't connect\", he says     Chronic low back pain since teenager.  Left lower extremity numbness and tingling from previous bypass surgery. Bottoms of feet are numb from chemotherapy.      Present symptoms: pain, stiffness.    Pain severity: 6/10  Pain quality: aching and sharp  Frequency of symptoms: are constant  Exacerbating Factors: weight bearing, stairs, [id number], in and out of car, donning shoes/socks.  Relieving Factors: rest, sitting  Night Pain: Yes  Pain while at rest: Yes   Associated numbness or tingling:  unrelated but toes have numbness and tingling.        Other PMH:  has a past medical history of Arthritis, Colon cancer (H) (8/05), Combined forms of age-related cataract, mild, of both eyes (8/14/2021), GERD (gastroesophageal reflux " disease), High cholesterol, and Peripheral arterial disease.    He has no past medical history of Amblyopia, Complication of anesthesia, Diabetes (H), Diabetic retinopathy associated with diabetes mellitus due to underlying condition (H), Glaucoma (increased eye pressure), Hypertension, Macular degeneration, Malignant hyperthermia, Retinal detachment, Strabismus, or Uveitis.  Patient Active Problem List   Diagnosis    CARDIOVASCULAR SCREENING; LDL GOAL LESS THAN 100    Choroidal nevus of left eye    SNHL (sensory-neural hearing loss), asymmetrical    Bilateral popliteal artery aneurysm    Vitreomacular traction syndrome of right eye    Combined forms of age-related cataract, mild, of both eyes    Pure hypercholesterolemia    Osteoarthrosis    Malignant neoplasm of colon, unspecified part of colon (H)       Surgical Hx:  has a past surgical history that includes surgical history of - ; colectomy (8/05); and hernia repair, inguinal rt/lt.    Medications:   Current Outpatient Medications:     ALPRAZolam (XANAX) 0.5 MG tablet, Take 0.5 mg by mouth, Disp: , Rfl:     ASPIRIN 81 MG OR TABS, 1 TABLET DAILY, Disp: , Rfl:     CYCLOBENZAPRINE HCL PO, , Disp: , Rfl:     glucosamine-chondroitin 500-400 MG CAPS per capsule, Take 1 capsule by mouth daily (Patient not taking: Reported on 3/14/2024), Disp: , Rfl:     OMEPRAZOLE PO, Take  by mouth., Disp: , Rfl:     predniSONE (DELTASONE) 10 MG tablet, Take 60 mg days 1 and 2, 50 mg days 3 and 4, 40 mg days 5 and 6, 30 mg days 7 and 8, 20 mg days 9 and 10, 10 mg days 11 and 12, Disp: 42 tablet, Rfl: 0    SIMVASTATIN 80 MG OR TABS, 1 TABLET EVERY EVENING, Disp: , Rfl:     Allergies:   Allergies   Allergen Reactions    Buspirone Other (See Comments)     Feels like he is going to jump out of his skin    Hydrocodone Nausea and Vomiting and Nausea     Other reaction(s): Sweating, Nausea  Nausea and sweats      Oxycodone Other (See Comments)     Tylenol/Acetaminophen is OK.  Sweating, pins  "and needles.  Nightmares    Oxycodone-Acetaminophen Nausea and Vomiting     Other reaction(s): Finding of vomiting       Social Hx: retired.  reports that he quit smoking about 20 years ago. His smoking use included cigars. He has been exposed to tobacco smoke. He has never used smokeless tobacco. He reports current alcohol use. He reports that he does not use drugs.    Family Hx: family history includes Alcohol/Drug in his father and mother; Arthritis in his father and mother; Asthma in an other family member; Breast Cancer in his mother; Cancer in his brother; Cancer - colorectal in an other family member; Cardiovascular in his father; Depression in an other family member; Gastrointestinal Disease in his brother and mother; Gynecology in his mother; Hypertension in his father; Lipids in his father; Osteoporosis in his mother; Psychotic Disorder in an other family member; Respiratory in his mother; Thyroid Disease in his sister and another family member.    REVIEW OF SYSTEMS:  10 point ROS neg other than the symptoms noted above in the HPI and PAST MEDICAL HISTORY. Notables,  CONSTITUTIONAL:NEGATIVE for fever, chills, change in weight  INTEGUMENTARY/SKIN: NEGATIVE for worrisome rashes, moles or lesions  MUSCULOSKELETAL:See HPI above  NEURO: NEGATIVE for weakness, dizziness or paresthesias    PHYSICAL EXAM:  BP (!) 149/77 (BP Location: Left arm)   Ht 1.93 m (6' 4\")   Wt 107.5 kg (237 lb)   BMI 28.85 kg/m     GENERAL APPEARANCE: healthy, alert, no distress; accompanied by his wife.  SKIN: no suspicious lesions or rashes  NEURO: decreased strength and tone, mentation intact and speech normal  PSYCH:  mentation appears normal and affect normal  RESPIRATORY: No increased work of breathing.  LYMPH: no palpable inguinal lymph nodes.    BILATERAL LOWER EXTREMITIES:  Gait: hunched, shuffled.   Neutral to slight varus alignment.  No gross deformities or masses.  No Quad atrophy, strength normal.decreased sensation " "bilateral feet, left inner leg/thigh from prior surgery.  Intact EHL, EDL, TA, FHL, GS, quadriceps hamstrings and hip flexors   Bilateral calf soft and nttp or squeeze.  DTRs: achilles 2+, patella 2+.  Edema: trace  Leg lengths: grossly symmetric at medial malleolus.      BACK EXAM  Lumbar range of motion: decreased, causes low back pain.    LEFT HIP EXAM:      Palpation: Tender:   mid and left low back, SI joint. Buttock.  Strength:  4/5  Special tests:  Irritability (flexion/adduction/internal rotation) severe positive.  Hip range of motion: very limited internal rotation / external rotation; flexion 80 degrees.      RIGHT HIP EXAM:      Palpation: Tender:   mid and right  low back, SI joint. Buttock.  Strength:  4/5  Special tests:  Irritability (flexion/adduction/internal rotation) severe positive.  Hip range of motion: very limited internal rotation / external rotation; flexion 80 degrees.      X-RAY: AP pelvis and AP/Lateral views bilateral hip from 1/20/2025 were reviewed in clinic today. No obvious fractures or  dislocations. Advanced degenerative changes of both hip joints. Severe hip joint degenerative changes bilaterally with bone-on-bone articulation and osteophytosis. Moderate degenerative changes of the sacroiliac joints and lower lumbar spine. Osteopenia.       Impression: 68yo male with chronic bilateral hip pain, advanced primary osteoarthritis.    Plan:   * discussed pain in groin/hip likely from advanced hip arthritis. This is wearing of the cartilage within the hip joint either due to normal \"wear and tear\" or following an injury. Any low back / buttock / radiating pain likely coming from the low back.  I don't think there is a fracture.    *  treatment options for hip arthritis and pain include: do nothing, NSAIDS, activity modification, Physical Therapy, injections, total hip arthroplasty. Risks and benefits of each discussed.   * did discuss patient is a candidate for total hip arthroplasty, " "and offered total hip arthroplasty to patient. Total hip arthroplasty will only attempt to provide pain relief from hip related arthritis only and not any pain from the low back. Any low back pain likely to continue.  *  Discussed risks of surgery include, but not limited to: bleeding, infection, pain, scar, damage to adjacent structures (e.g. Nerves, blood vessels, bone, cartilage), temporary or permanent nerve damage, recurrence of symptoms, implant dislocation, implant failure, implant infection, unequal limb lengths, stiffness, need for further surgery, blood clots, pulmonary embolism, risks of anesthesia, and death.    * understanding the risks of surgery, as a quality of life decision, patient would like to proceed with surgery: RIGHT total hip arthroplasty.  * will arrange RIGHT total hip arthroplasty at a mutual convenience in the near future    I did express my concerns regarding postoperative pain control, given his \"allergies\" to hydrocodone, oxycodone, etc. This can make postoperative management challenging. He's likely going to have to endure more pain as a result.  I thought maybe acetaminophen, ibuprofen, tramadol or dilaudid perhaps. Otherwise try to symptomatically treat side effects to opioids.    * discussed patient and their  will plan hospitalization overnight with discharge home the next morning, daily physical therapy starting either the day of or day after surgery.  * will plan postoperative deep vein thrombosis prophylaxis x4 weeks.  Additionally, graduated compression stockings x4 weeks, and SCDs while in the hospital.  * postoperative pain control will be oral medications upon discharge from hospital  * postoperative Physical Therapy will be discussed, if needed, at first postoperative visit at 2 weeks  * patient will need longterm (at least 2 years) prophylactic antibiotics use with dental procedures or other invasive procedures (2 g amoxicilin or  2g Keflex or 500mg azithromycin or " clarithromycin or 100mg doxycycline) 30-60 minutes prior to dental procedures.  * patient will need preoperative H+P prior to surgery from PCP.  * will see patient 2 weeks postoperative, sooner as needed. Will obtain lowset AP pelvis and lateral right hip xray at that time.         Daniel Hammonds M.D., M.S.  Dept. of Orthopaedic Surgery  [facility]      T: 54, 9/10 hi, -4

## 2025-01-20 NOTE — LETTER
"1/20/2025      Paulette Dorsey  [address]   [facility] [address]      Dear Colleague,    Thank you for referring your patient, Paueltte Dorsey, to the [facility] [place]. Please see a copy of my visit note below.    Chief Complaint:   Chief Complaint   Patient presents with     Left Hip - Pain     Right Hip - Pain     Pain in both hips, with R hip being more symptomatic.        HISTORY OF PRESENT ILLNESS    Paulette Dorsey is a 69 year old male seen for evaluation of ongoing bilateral hip pain with no known injury.   Right more painful than the left. Pain has been present for 6 years or more.  Seen for the same 9/2023 and at that time discussed total hip arthroplasty, but he never followed through. He'd like to discuss right hip replacement surgery.    Taking ibuprofen for pain.    Locates pain to the front of the hips, groin, but sometimes along the outer aspect. Mild pain at rest, worse with activities, standing, walking, sit to stand, bending, driving. Does have pain at night rolling side to side. Denies perceived limb length inequality.    Seen by Dr. Denise Lai, 9/19/2023, for the same.      Treatment has been cortisone injections 4/2023 with a couple of weeks relief. Therapy a few years back. Takes over the counter ibuprofen, aspirin. He doesn't tolerate opioid pain medications.    Had total hip arthroplasty scheduled in August 2023, but cancelled (at O). He and surgeon \"didn't connect\", he says     Chronic low back pain since teenager.  Left lower extremity numbness and tingling from previous bypass surgery. Bottoms of feet are numb from chemotherapy.      Present symptoms: pain, stiffness.    Pain severity: 6/10  Pain quality: aching and sharp  Frequency of symptoms: are constant  Exacerbating Factors: weight bearing, stairs, [id number], in and out of car, donning shoes/socks.  Relieving Factors: rest, sitting  Night Pain: Yes  Pain while at rest: Yes   Associated " numbness or tingling:  unrelated but toes have numbness and tingling.        Other PMH:  has a past medical history of Arthritis, Colon cancer (H) (8/05), Combined forms of age-related cataract, mild, of both eyes (8/14/2021), GERD (gastroesophageal reflux disease), High cholesterol, and Peripheral arterial disease.    He has no past medical history of Amblyopia, Complication of anesthesia, Diabetes (H), Diabetic retinopathy associated with diabetes mellitus due to underlying condition (H), Glaucoma (increased eye pressure), Hypertension, Macular degeneration, Malignant hyperthermia, Retinal detachment, Strabismus, or Uveitis.  Patient Active Problem List   Diagnosis     CARDIOVASCULAR SCREENING; LDL GOAL LESS THAN 100     Choroidal nevus of left eye     SNHL (sensory-neural hearing loss), asymmetrical     Bilateral popliteal artery aneurysm     Vitreomacular traction syndrome of right eye     Combined forms of age-related cataract, mild, of both eyes     Pure hypercholesterolemia     Osteoarthrosis     Malignant neoplasm of colon, unspecified part of colon (H)       Surgical Hx:  has a past surgical history that includes surgical history of - ; colectomy (8/05); and hernia repair, inguinal rt/lt.    Medications:   Current Outpatient Medications:      ALPRAZolam (XANAX) 0.5 MG tablet, Take 0.5 mg by mouth, Disp: , Rfl:      ASPIRIN 81 MG OR TABS, 1 TABLET DAILY, Disp: , Rfl:      CYCLOBENZAPRINE HCL PO, , Disp: , Rfl:      glucosamine-chondroitin 500-400 MG CAPS per capsule, Take 1 capsule by mouth daily (Patient not taking: Reported on 3/14/2024), Disp: , Rfl:      OMEPRAZOLE PO, Take  by mouth., Disp: , Rfl:      predniSONE (DELTASONE) 10 MG tablet, Take 60 mg days 1 and 2, 50 mg days 3 and 4, 40 mg days 5 and 6, 30 mg days 7 and 8, 20 mg days 9 and 10, 10 mg days 11 and 12, Disp: 42 tablet, Rfl: 0     SIMVASTATIN 80 MG OR TABS, 1 TABLET EVERY EVENING, Disp: , Rfl:     Allergies:   Allergies   Allergen Reactions  "    Buspirone Other (See Comments)     Feels like he is going to jump out of his skin     Hydrocodone Nausea and Vomiting and Nausea     Other reaction(s): Sweating, Nausea  Nausea and sweats       Oxycodone Other (See Comments)     Tylenol/Acetaminophen is OK.  Sweating, pins and needles.  Nightmares     Oxycodone-Acetaminophen Nausea and Vomiting     Other reaction(s): Finding of vomiting       Social Hx: retired.  reports that he quit smoking about 20 years ago. His smoking use included cigars. He has been exposed to tobacco smoke. He has never used smokeless tobacco. He reports current alcohol use. He reports that he does not use drugs.    Family Hx: family history includes Alcohol/Drug in his father and mother; Arthritis in his father and mother; Asthma in an other family member; Breast Cancer in his mother; Cancer in his brother; Cancer - colorectal in an other family member; Cardiovascular in his father; Depression in an other family member; Gastrointestinal Disease in his brother and mother; Gynecology in his mother; Hypertension in his father; Lipids in his father; Osteoporosis in his mother; Psychotic Disorder in an other family member; Respiratory in his mother; Thyroid Disease in his sister and another family member.    REVIEW OF SYSTEMS:  10 point ROS neg other than the symptoms noted above in the HPI and PAST MEDICAL HISTORY. Notables,  CONSTITUTIONAL:NEGATIVE for fever, chills, change in weight  INTEGUMENTARY/SKIN: NEGATIVE for worrisome rashes, moles or lesions  MUSCULOSKELETAL:See HPI above  NEURO: NEGATIVE for weakness, dizziness or paresthesias    PHYSICAL EXAM:  BP (!) 149/77 (BP Location: Left arm)   Ht 1.93 m (6' 4\")   Wt 107.5 kg (237 lb)   BMI 28.85 kg/m     GENERAL APPEARANCE: healthy, alert, no distress; accompanied by his wife.  SKIN: no suspicious lesions or rashes  NEURO: decreased strength and tone, mentation intact and speech normal  PSYCH:  mentation appears normal and affect " "normal  RESPIRATORY: No increased work of breathing.  LYMPH: no palpable inguinal lymph nodes.    BILATERAL LOWER EXTREMITIES:  Gait: hunched, shuffled.   Neutral to slight varus alignment.  No gross deformities or masses.  No Quad atrophy, strength normal.decreased sensation bilateral feet, left inner leg/thigh from prior surgery.  Intact EHL, EDL, TA, FHL, GS, quadriceps hamstrings and hip flexors   Bilateral calf soft and nttp or squeeze.  DTRs: achilles 2+, patella 2+.  Edema: trace  Leg lengths: grossly symmetric at medial malleolus.      BACK EXAM  Lumbar range of motion: decreased, causes low back pain.    LEFT HIP EXAM:      Palpation: Tender:   mid and left low back, SI joint. Buttock.  Strength:  4/5  Special tests:  Irritability (flexion/adduction/internal rotation) severe positive.  Hip range of motion: very limited internal rotation / external rotation; flexion 80 degrees.      RIGHT HIP EXAM:      Palpation: Tender:   mid and right  low back, SI joint. Buttock.  Strength:  4/5  Special tests:  Irritability (flexion/adduction/internal rotation) severe positive.  Hip range of motion: very limited internal rotation / external rotation; flexion 80 degrees.      X-RAY: AP pelvis and AP/Lateral views bilateral hip from 1/20/2025 were reviewed in clinic today. No obvious fractures or  dislocations. Advanced degenerative changes of both hip joints. Severe hip joint degenerative changes bilaterally with bone-on-bone articulation and osteophytosis. Moderate degenerative changes of the sacroiliac joints and lower lumbar spine. Osteopenia.       Impression: 68yo male with chronic bilateral hip pain, advanced primary osteoarthritis.    Plan:   * discussed pain in groin/hip likely from advanced hip arthritis. This is wearing of the cartilage within the hip joint either due to normal \"wear and tear\" or following an injury. Any low back / buttock / radiating pain likely coming from the low back.  I don't think there " "is a fracture.    *  treatment options for hip arthritis and pain include: do nothing, NSAIDS, activity modification, Physical Therapy, injections, total hip arthroplasty. Risks and benefits of each discussed.   * did discuss patient is a candidate for total hip arthroplasty, and offered total hip arthroplasty to patient. Total hip arthroplasty will only attempt to provide pain relief from hip related arthritis only and not any pain from the low back. Any low back pain likely to continue.  *  Discussed risks of surgery include, but not limited to: bleeding, infection, pain, scar, damage to adjacent structures (e.g. Nerves, blood vessels, bone, cartilage), temporary or permanent nerve damage, recurrence of symptoms, implant dislocation, implant failure, implant infection, unequal limb lengths, stiffness, need for further surgery, blood clots, pulmonary embolism, risks of anesthesia, and death.    * understanding the risks of surgery, as a quality of life decision, patient would like to proceed with surgery: RIGHT total hip arthroplasty.  * will arrange RIGHT total hip arthroplasty at a mutual convenience in the near future    I did express my concerns regarding postoperative pain control, given his \"allergies\" to hydrocodone, oxycodone, etc. This can make postoperative management challenging. He's likely going to have to endure more pain as a result.  I thought maybe acetaminophen, ibuprofen, tramadol or dilaudid perhaps. Otherwise try to symptomatically treat side effects to opioids.    * discussed patient and their  will plan hospitalization overnight with discharge home the next morning, daily physical therapy starting either the day of or day after surgery.  * will plan postoperative deep vein thrombosis prophylaxis x4 weeks.  Additionally, graduated compression stockings x4 weeks, and SCDs while in the hospital.  * postoperative pain control will be oral medications upon discharge from hospital  * " postoperative Physical Therapy will be discussed, if needed, at first postoperative visit at 2 weeks  * patient will need longterm (at least 2 years) prophylactic antibiotics use with dental procedures or other invasive procedures (2 g amoxicilin or  2g Keflex or 500mg azithromycin or clarithromycin or 100mg doxycycline) 30-60 minutes prior to dental procedures.  * patient will need preoperative H+P prior to surgery from PCP.  * will see patient 2 weeks postoperative, sooner as needed. Will obtain lowset AP pelvis and lateral right hip xray at that time.         Daniel Hammonds M.D., M.S.  Dept. of Orthopaedic Surgery  [facility]      T: 54, 9/10 hi, -4           Again, thank you for allowing me to participate in the care of your patient.        Sincerely,        Daniel Hammonds MD    Electronically signed

## 2025-01-21 NOTE — TELEPHONE ENCOUNTER
Type of surgery: ARTHROPLASTY, right HIP, TOTAL, DIRECT ANTERIOR APPROACH   Location of surgery: [facility] OR  Date and time of surgery: 03/04/2025  Surgeon: Cassius   Pre-Op Appt Date: 02/17/2025  Post-Op Appt Date: 03/20/2025   Packet sent out: Yes  Pre-cert/Authorization completed:  No  Date:     no

## 2025-01-28 ENCOUNTER — OFFICE VISIT (OUTPATIENT)
Dept: OPHTHALMOLOGY | Facility: CLINIC | Age: 70
End: 2025-01-28
Payer: COMMERCIAL

## 2025-01-28 ENCOUNTER — TELEPHONE (OUTPATIENT)
Dept: OPHTHALMOLOGY | Facility: CLINIC | Age: 70
End: 2025-01-28

## 2025-01-28 DIAGNOSIS — H25.813 COMBINED FORMS OF AGE-RELATED CATARACT OF BOTH EYES: ICD-10-CM

## 2025-01-28 DIAGNOSIS — H43.811 PVD (POSTERIOR VITREOUS DETACHMENT), RIGHT EYE: Primary | ICD-10-CM

## 2025-01-28 PROCEDURE — 92134 CPTRZ OPH DX IMG PST SGM RTA: CPT | Performed by: OPHTHALMOLOGY

## 2025-01-28 PROCEDURE — 92002 INTRM OPH EXAM NEW PATIENT: CPT | Performed by: OPHTHALMOLOGY

## 2025-01-28 ASSESSMENT — REFRACTION_WEARINGRX
OS_SPHERE: -2.25
OS_CYLINDER: +0.25
OD_SPHERE: -2.75
OD_CYLINDER: +0.25
OS_ADD: +1.25
SPECS_TYPE: COMPUTER BIFOCAL
OD_ADD: +1.25
OD_AXIS: 100
OS_AXIS: 002

## 2025-01-28 ASSESSMENT — CONF VISUAL FIELD
OD_SUPERIOR_TEMPORAL_RESTRICTION: 0
OD_NORMAL: 1
METHOD: COUNTING FINGERS
OS_NORMAL: 1
OS_INFERIOR_TEMPORAL_RESTRICTION: 0
OD_SUPERIOR_NASAL_RESTRICTION: 0
OS_SUPERIOR_TEMPORAL_RESTRICTION: 0
OS_INFERIOR_NASAL_RESTRICTION: 0
OS_SUPERIOR_NASAL_RESTRICTION: 0
OD_INFERIOR_NASAL_RESTRICTION: 0
OD_INFERIOR_TEMPORAL_RESTRICTION: 0

## 2025-01-28 ASSESSMENT — VISUAL ACUITY
METHOD: SNELLEN - LINEAR
CORRECTION_TYPE: GLASSES
OD_CC: 20/30
OS_CC: 20/25

## 2025-01-28 ASSESSMENT — EXTERNAL EXAM - RIGHT EYE: OD_EXAM: NORMAL

## 2025-01-28 ASSESSMENT — CUP TO DISC RATIO
OD_RATIO: 0.3
OS_RATIO: 0.35

## 2025-01-28 ASSESSMENT — EXTERNAL EXAM - LEFT EYE: OS_EXAM: NORMAL

## 2025-01-28 ASSESSMENT — TONOMETRY
OS_IOP_MMHG: 21
OD_IOP_MMHG: 21
IOP_METHOD: APPLANATION

## 2025-01-28 ASSESSMENT — SLIT LAMP EXAM - LIDS
COMMENTS: 1+ DERMATOCHALASIS - UPPER LID
COMMENTS: 1+ DERMATOCHALASIS - UPPER LID

## 2025-01-28 NOTE — TELEPHONE ENCOUNTER
History of VMT for patient. Called with wife and he will come for dilated exam today at 2 pm. He hasn't been in since 2021, told him we will dilate both eyes, he is fine with that.   Bong will call patient to update his insurance.

## 2025-01-28 NOTE — PROGRESS NOTES
" Current Eye Medications:  none      Subjective:  sudden onset of floater that looks like an \"ink spot\" in lower outer portion of vision of right eye. Patient will also occasionally notice an arch like flash of light towards the far right side of vision that started yesterday evening as well.  Vision of right eye seems overall unchanged. No floaters or flashes of lights left eye.     Hx VMT both eyes - last eye exam with was Dr. Alicia in 2021.     Has a headache on right side of his head since this AM.      Objective:  See Ophthalmology Exam.       Assessment:      Plan:   See Patient Instructions.     "

## 2025-01-28 NOTE — TELEPHONE ENCOUNTER
[facility] Call Center    Phone Message    May a detailed message be left on voicemail: yes     Reason for Call: Symptoms or Concerns     If patient has red-flag symptoms, warm transfer to triage line    Current symptom or concern: Right eye floater and flashes when he blinks.    Symptoms have been present for:  1 day since last night.    Has patient previously been seen for this? No    By :     Date:     Are there any new or worsening symptoms? No    Action Taken: Message routed to:  [facility] Ophthalmology    Travel Screening: Not Applicable     Date of Service:

## 2025-01-28 NOTE — Clinical Note
"1/28/2025      Paulette Dorsey  [address]   [facility] [address]      Dear Colleague,    Thank you for referring your patient, Paulette Dorsey, to the [facility]. Please see a copy of my visit note below.     Current Eye Medications:  none      Subjective:  sudden onset of floater that looks like an \"ink spot\" in lower outer portion of vision of right eye. Patient will also occasionally notice an arch like flash of light towards the far right side of vision that started yesterday evening as well.  Vision of right eye seems overall unchanged. No floaters or flashes of lights left eye.     Hx VMT both eyes - last eye exam with was Dr. Alicia in 2021.     Has a headache on right side of his head since this AM.      Objective:  See Ophthalmology Exam.       Assessment:      Plan:   See Patient Instructions.         Again, thank you for allowing me to participate in the care of your patient.        Sincerely,        Osmin Alicia MD    Electronically signed"

## 2025-01-28 NOTE — PATIENT INSTRUCTIONS
Possible posterior vitreous detachment (sudden onset large floater and/or flashing lights) left eye discussed.     Signs and symptoms of retinal detachment (shower of black floaters, frequent flashing even during day, curtain over part of visual field) discussed.     Flashes and Floaters brochure given.     Return visit for a complete exam at your convenience.     Osmin Alicia M.D.  [phone]

## 2025-02-02 PROBLEM — H43.812 POSTERIOR VITREOUS DETACHMENT, LEFT EYE: Status: ACTIVE | Noted: 2025-02-02

## 2025-02-02 PROBLEM — H43.821 VITREOMACULAR TRACTION SYNDROME OF RIGHT EYE: Status: RESOLVED | Noted: 2021-08-14 | Resolved: 2025-02-02

## 2025-02-20 ENCOUNTER — OFFICE VISIT (OUTPATIENT)
Dept: FAMILY MEDICINE | Facility: CLINIC | Age: 70
End: 2025-02-20
Payer: COMMERCIAL

## 2025-02-20 VITALS
SYSTOLIC BLOOD PRESSURE: 120 MMHG | OXYGEN SATURATION: 98 % | HEIGHT: 76 IN | DIASTOLIC BLOOD PRESSURE: 66 MMHG | HEART RATE: 91 BPM | BODY MASS INDEX: 28.06 KG/M2 | RESPIRATION RATE: 20 BRPM | WEIGHT: 230.4 LBS | TEMPERATURE: 98.5 F

## 2025-02-20 DIAGNOSIS — M16.11 PRIMARY OSTEOARTHRITIS OF RIGHT HIP: ICD-10-CM

## 2025-02-20 DIAGNOSIS — Z13.1 SCREENING FOR DIABETES MELLITUS: ICD-10-CM

## 2025-02-20 DIAGNOSIS — R21 RASH: ICD-10-CM

## 2025-02-20 DIAGNOSIS — Z01.818 PREOP GENERAL PHYSICAL EXAM: Primary | ICD-10-CM

## 2025-02-20 DIAGNOSIS — F51.01 PRIMARY INSOMNIA: ICD-10-CM

## 2025-02-20 LAB
ANION GAP SERPL CALCULATED.3IONS-SCNC: 13 MMOL/L (ref 7–15)
BUN SERPL-MCNC: 13.6 MG/DL (ref 8–23)
CALCIUM SERPL-MCNC: 9.8 MG/DL (ref 8.8–10.4)
CHLORIDE SERPL-SCNC: 103 MMOL/L (ref 98–107)
CREAT SERPL-MCNC: 0.89 MG/DL (ref 0.67–1.17)
EGFRCR SERPLBLD CKD-EPI 2021: >90 ML/MIN/1.73M2
EST. AVERAGE GLUCOSE BLD GHB EST-MCNC: 114 MG/DL
GLUCOSE SERPL-MCNC: 99 MG/DL (ref 70–99)
HBA1C MFR BLD: 5.6 % (ref 0–5.6)
HCO3 SERPL-SCNC: 25 MMOL/L (ref 22–29)
HGB BLD-MCNC: 15.5 G/DL (ref 13.3–17.7)
PLATELET # BLD AUTO: 181 10E3/UL (ref 150–450)
POTASSIUM SERPL-SCNC: 3.6 MMOL/L (ref 3.4–5.3)
SODIUM SERPL-SCNC: 141 MMOL/L (ref 135–145)

## 2025-02-20 RX ORDER — CLOTRIMAZOLE AND BETAMETHASONE DIPROPIONATE 10; .5 MG/ML; MG/ML
LOTION TOPICAL 2 TIMES DAILY
Qty: 60 ML | Refills: 1 | Status: SHIPPED | OUTPATIENT
Start: 2025-02-20

## 2025-02-20 RX ORDER — TRAZODONE HYDROCHLORIDE 50 MG/1
50-100 TABLET ORAL AT BEDTIME
Qty: 60 TABLET | Refills: 0 | Status: SHIPPED | OUTPATIENT
Start: 2025-02-20

## 2025-02-20 NOTE — PROGRESS NOTES
Preoperative Evaluation  [facility] [place]  [address]  [place] [address]  Phone: [phone]  Primary Provider: Zach Ochoa PA-C  Pre-op Performing Provider: Zach Ochoa PA-C  Feb 20, 2025 2/18/2025   Surgical Information   What procedure is being done? Hip replacement   Facility or Hospital where procedure/surgery will be performed: [facility]   Who is doing the procedure / surgery? Dr Hammonds   Date of surgery / procedure: March 4th 2025   Time of surgery / procedure: 10:30a   Where do you plan to recover after surgery? at home with family     Fax number for surgical facility: Note does not need to be faxed, will be available electronically in Epic.      Angus Franks is a 69 year old, presenting for the following:  Pre-Op Exam (3/4/25/Right hip replacement/[facility]/Dr. Hammonds)          2/20/2025     9:07 AM   Additional Questions   Roomed by Miriam Desai CMA   Accompanied by wife         2/20/2025     9:07 AM   Patient Reported Additional Medications   Patient reports taking the following new medications none     HPI related to upcoming procedure: Right hip replacement        2/18/2025   Pre-Op Questionnaire   Have you ever had a heart attack or stroke? No   Have you ever had surgery on your heart or blood vessels, such as a stent placement, a coronary artery bypass, or surgery on an artery in your head, neck, heart, or legs? No   Do you have chest pain with activity? No   Do you have a history of heart failure? No   Do you currently have a cold, bronchitis or symptoms of other infection? No   Do you have a cough, shortness of breath, or wheezing? No   Do you or anyone in your family have previous history of blood clots? No   Do you or does anyone in your family have a serious bleeding problem such as prolonged bleeding following surgeries or cuts? No   Have you ever had problems with anemia or been told to take iron pills? No   Have you had  any abnormal blood loss such as black, tarry or bloody stools? No   Have you ever had a blood transfusion? No   Are you willing to have a blood transfusion if it is medically needed before, during, or after your surgery? (!) NO    Have you or any of your relatives ever had problems with anesthesia? No   Do you have sleep apnea, excessive snoring or daytime drowsiness? (!) YES   Do you have a CPAP machine? Yes   Do you have any artifical heart valves or other implanted medical devices like a pacemaker, defibrillator, or continuous glucose monitor? No   Do you have artificial joints? No   Are you allergic to latex? No     Health Care Directive  Patient does not have a Health Care Directive: Discussed advance care planning with patient; however, patient declined at this time.    Preoperative Review of    reviewed - no record of controlled substances prescribed.      Status of Chronic Conditions:  See problem list for active medical problems.  Problems all longstanding and stable, except as noted/documented.  See ROS for pertinent symptoms related to these conditions.    Patient Active Problem List    Diagnosis Date Noted    Malignant neoplasm of colon, unspecified part of colon (H) 03/21/2024     Priority: Medium    Osteoarthrosis 09/29/2023     Priority: Medium     Sep 03, 2019 Entered By: KAREN TAYLOR Comment: RIGHT SHOULDER AND BOTH HIPS.      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 King Noe, oncology [facility].  Had surgery and chemo    Combined forms of age-related cataract, mild, of both eyes 2021    GERD (gastroesophageal reflux disease)     High cholesterol     Peripheral arterial disease     popliteal aneurysm [place]     Past Surgical History:   Procedure Laterality Date    ABDOMEN SURGERY  aug 2025    Colon Cancer    COLECTOMY  2005    partial colectomy    COLONOSCOPY  Mar 2024    HERNIA REPAIR, INGUINAL RT/LT      Hernia Repair, Femoral RT    SURGICAL HISTORY OF -       left popliteal bypass secondary to aneurysm    VASCULAR SURGERY  2006    Aneurysm popliteal Left     Current Outpatient Medications   Medication Sig Dispense Refill    ALPRAZolam (XANAX) 0.5 MG tablet Take 0.5 mg by mouth      ASPIRIN 81 MG OR TABS 1 TABLET DAILY      clotrimazole-betamethasone (LOTRISONE) 1-0.05 % external lotion Apply topically 2 times daily. 60 mL 1    CYCLOBENZAPRINE HCL PO       OMEPRAZOLE PO Take  by mouth.      SIMVASTATIN 80 MG OR TABS 1 TABLET EVERY EVENING      traZODone (DESYREL) 50 MG tablet Take 1-2 tablets ( mg) by mouth at bedtime. 60 tablet 0       Allergies   Allergen Reactions    Buspirone Other (See Comments)     Feels like he is going to jump out of his skin    Hydrocodone Nausea and Vomiting and Nausea     Other reaction(s): Sweating, Nausea  Nausea and sweats      Oxycodone Other (See Comments)     Tylenol/Acetaminophen is OK.  Sweating, pins and needles.  Nightmares    Oxycodone-Acetaminophen Nausea and Vomiting     Other reaction(s): Finding of vomiting        Social History     Tobacco Use    Smoking status: Former     Current packs/day: 0.00     Types: Cigars, Cigarettes     Quit date: 2005     Years since quittin.1     Passive exposure: Past    Smokeless tobacco: Never    Tobacco comments:     Very occasional   Substance Use Topics    Alcohol use: Yes     Comment: little bit on weekends     Family  "History   Problem Relation Age of Onset    Breast Cancer Mother     Respiratory Mother         emphesema    Gastrointestinal Disease Mother         diverticulitis    Alcohol/Drug Mother     Arthritis Mother     Osteoporosis Mother     Gynecology Mother     Hypertension Father     Alcohol/Drug Father     Arthritis Father     Lipids Father     Cardiovascular Father         AAA    Hyperlipidemia Father     Cancer Brother         sarcoidosis    Gastrointestinal Disease Brother         diverticulitis    Thyroid Disease Sister     Asthma Other         M. Aunt (Eneida)    Cancer - colorectal Other         Cousin  from colon ca    Thyroid Disease Other         M. cousin had thyroid issues    Psychotic Disorder Other         M. Cousin bipolar and suicide    Depression Other         M. cousin    C.A.D. No family hx of     Diabetes No family hx of     Cerebrovascular Disease No family hx of     Prostate Cancer No family hx of     Allergies No family hx of     Alzheimer Disease No family hx of     Anesthesia Reaction No family hx of     Obesity No family hx of     Neurologic Disorder No family hx of     Congenital Anomalies No family hx of     Connective Tissue Disorder No family hx of     Blood Disease No family hx of     Glaucoma No family hx of     Macular Degeneration No family hx of      History   Drug Use No             Review of Systems  Constitutional, HEENT, cardiovascular, pulmonary, GI, , musculoskeletal, neuro, skin, endocrine and psych systems are negative, except as otherwise noted.    Objective    /66   Pulse 91   Temp 98.5  F (36.9  C) (Oral)   Resp 20   Ht 1.93 m (6' 4\")   Wt 104.5 kg (230 lb 6.4 oz)   SpO2 98%   BMI 28.05 kg/m     Estimated body mass index is 28.05 kg/m  as calculated from the following:    Height as of this encounter: 1.93 m (6' 4\").    Weight as of this encounter: 104.5 kg (230 lb 6.4 oz).  Physical Exam  GENERAL: alert and no distress  EYES: Eyes grossly normal to " "inspection, PERRL and conjunctivae and sclerae normal  HENT: ear canals and TM's normal, nose and mouth without ulcers or lesions  NECK: no adenopathy, no asymmetry, masses, or scars  RESP: lungs clear to auscultation - no rales, rhonchi or wheezes  CV: regular rate and rhythm, normal S1 S2, no S3 or S4, no murmur, click or rub, no peripheral edema  ABDOMEN: soft, nontender, no hepatosplenomegaly, no masses and bowel sounds normal  MS: no gross musculoskeletal defects noted, no edema  SKIN: no suspicious lesions or rashes  NEURO: Normal strength and tone, mentation intact and speech normal  PSYCH: mentation appears normal, affect normal/bright    No results for input(s): \"HGB\", \"PLT\", \"INR\", \"NA\", \"POTASSIUM\", \"CR\", \"A1C\" in the last 8760 hours.     Diagnostics  Labs pending at this time.  Results will be reviewed when available.   EKG: appears normal, NSR, normal axis, normal intervals, no acute ST/T changes c/w ischemia, no LVH by voltage criteria, unchanged from previous tracings    Revised Cardiac Risk Index (RCRI)  The patient has the following serious cardiovascular risks for perioperative complications:   - No serious cardiac risks = 0 points     RCRI Interpretation: 0 points: Class I (very low risk - 0.4% complication rate)       Sabrina Rivera was seen today for pre-op exam.    Diagnoses and all orders for this visit:    Preop general physical exam  -     Hemoglobin; Future  -     Platelet count; Future  -     Basic metabolic panel  (Ca, Cl, CO2, Creat, Gluc, K, Na, BUN); Future  -     EKG 12-lead complete w/read - Clinics    Primary osteoarthritis of right hip    Screening for diabetes mellitus  -     Hemoglobin A1c; Future    Rash  -     clotrimazole-betamethasone (LOTRISONE) 1-0.05 % external lotion; Apply topically 2 times daily.    Primary insomnia  -     traZODone (DESYREL) 50 MG tablet; Take 1-2 tablets ( mg) by mouth at bedtime.    Other orders  -     REVIEW OF HEALTH MAINTENANCE PROTOCOL " AMANDA Rivera is cleared for his surgery and appropriate anesthesia.    Signed Electronically by: Zach Ochoa PA-C  A copy of this evaluation report is provided to the requesting physician.

## 2025-02-20 NOTE — H&P (VIEW-ONLY)
Preoperative Evaluation  [facility] [place]  [address]  [place] [address]  Phone: [phone]  Primary Provider: Zach Ochoa PA-C  Pre-op Performing Provider: Zach Ochoa PA-C  Feb 20, 2025 2/18/2025   Surgical Information   What procedure is being done? Hip replacement   Facility or Hospital where procedure/surgery will be performed: [place]   Who is doing the procedure / surgery? Dr Hammonds   Date of surgery / procedure: March 4th 2025   Time of surgery / procedure: 10:30a   Where do you plan to recover after surgery? at home with family     Fax number for surgical facility: Note does not need to be faxed, will be available electronically in Epic.      Angus Franks is a 69 year old, presenting for the following:  Pre-Op Exam (3/4/25/Right hip replacement/[facility]/Dr. Hammonds)          2/20/2025     9:07 AM   Additional Questions   Roomed by Miriam Desai CMA   Accompanied by wife         2/20/2025     9:07 AM   Patient Reported Additional Medications   Patient reports taking the following new medications none     HPI related to upcoming procedure: Right hip replacement        2/18/2025   Pre-Op Questionnaire   Have you ever had a heart attack or stroke? No   Have you ever had surgery on your heart or blood vessels, such as a stent placement, a coronary artery bypass, or surgery on an artery in your head, neck, heart, or legs? No   Do you have chest pain with activity? No   Do you have a history of heart failure? No   Do you currently have a cold, bronchitis or symptoms of other infection? No   Do you have a cough, shortness of breath, or wheezing? No   Do you or anyone in your family have previous history of blood clots? No   Do you or does anyone in your family have a serious bleeding problem such as prolonged bleeding following surgeries or cuts? No   Have you ever had problems with anemia or been told to take iron pills? No   Have you had  any abnormal blood loss such as black, tarry or bloody stools? No   Have you ever had a blood transfusion? No   Are you willing to have a blood transfusion if it is medically needed before, during, or after your surgery? (!) NO    Have you or any of your relatives ever had problems with anesthesia? No   Do you have sleep apnea, excessive snoring or daytime drowsiness? (!) YES   Do you have a CPAP machine? Yes   Do you have any artifical heart valves or other implanted medical devices like a pacemaker, defibrillator, or continuous glucose monitor? No   Do you have artificial joints? No   Are you allergic to latex? No     Health Care Directive  Patient does not have a Health Care Directive: Discussed advance care planning with patient; however, patient declined at this time.    Preoperative Review of    reviewed - no record of controlled substances prescribed.      Status of Chronic Conditions:  See problem list for active medical problems.  Problems all longstanding and stable, except as noted/documented.  See ROS for pertinent symptoms related to these conditions.    Patient Active Problem List    Diagnosis Date Noted    Malignant neoplasm of colon, unspecified part of colon (H) 03/21/2024     Priority: Medium    Osteoarthrosis 09/29/2023     Priority: Medium     Sep 03, 2019 Entered By: KAREN TAYLOR Comment: RIGHT SHOULDER AND BOTH HIPS.      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 King Noe, oncology [facility].  Had surgery and chemo    Combined forms of age-related cataract, mild, of both eyes 2021    GERD (gastroesophageal reflux disease)     High cholesterol     Peripheral arterial disease     popliteal aneurysm [place]     Past Surgical History:   Procedure Laterality Date    ABDOMEN SURGERY  aug 2025    Colon Cancer    COLECTOMY  2005    partial colectomy    COLONOSCOPY  Mar 2024    HERNIA REPAIR, INGUINAL RT/LT      Hernia Repair, Femoral RT    SURGICAL HISTORY OF -       left popliteal bypass secondary to aneurysm    VASCULAR SURGERY  2006    Aneurysm popliteal Left     Current Outpatient Medications   Medication Sig Dispense Refill    ALPRAZolam (XANAX) 0.5 MG tablet Take 0.5 mg by mouth      ASPIRIN 81 MG OR TABS 1 TABLET DAILY      clotrimazole-betamethasone (LOTRISONE) 1-0.05 % external lotion Apply topically 2 times daily. 60 mL 1    CYCLOBENZAPRINE HCL PO       OMEPRAZOLE PO Take  by mouth.      SIMVASTATIN 80 MG OR TABS 1 TABLET EVERY EVENING      traZODone (DESYREL) 50 MG tablet Take 1-2 tablets ( mg) by mouth at bedtime. 60 tablet 0       Allergies   Allergen Reactions    Buspirone Other (See Comments)     Feels like he is going to jump out of his skin    Hydrocodone Nausea and Vomiting and Nausea     Other reaction(s): Sweating, Nausea  Nausea and sweats      Oxycodone Other (See Comments)     Tylenol/Acetaminophen is OK.  Sweating, pins and needles.  Nightmares    Oxycodone-Acetaminophen Nausea and Vomiting     Other reaction(s): Finding of vomiting        Social History     Tobacco Use    Smoking status: Former     Current packs/day: 0.00     Types: Cigars, Cigarettes     Quit date: 2005     Years since quittin.1     Passive exposure: Past    Smokeless tobacco: Never    Tobacco comments:     Very occasional   Substance Use Topics    Alcohol use: Yes     Comment: little bit on weekends     Family  "History   Problem Relation Age of Onset    Breast Cancer Mother     Respiratory Mother         emphesema    Gastrointestinal Disease Mother         diverticulitis    Alcohol/Drug Mother     Arthritis Mother     Osteoporosis Mother     Gynecology Mother     Hypertension Father     Alcohol/Drug Father     Arthritis Father     Lipids Father     Cardiovascular Father         AAA    Hyperlipidemia Father     Cancer Brother         sarcoidosis    Gastrointestinal Disease Brother         diverticulitis    Thyroid Disease Sister     Asthma Other         M. Aunt (Eneida)    Cancer - colorectal Other         Cousin  from colon ca    Thyroid Disease Other         M. cousin had thyroid issues    Psychotic Disorder Other         M. Cousin bipolar and suicide    Depression Other         M. cousin    C.A.D. No family hx of     Diabetes No family hx of     Cerebrovascular Disease No family hx of     Prostate Cancer No family hx of     Allergies No family hx of     Alzheimer Disease No family hx of     Anesthesia Reaction No family hx of     Obesity No family hx of     Neurologic Disorder No family hx of     Congenital Anomalies No family hx of     Connective Tissue Disorder No family hx of     Blood Disease No family hx of     Glaucoma No family hx of     Macular Degeneration No family hx of      History   Drug Use No             Review of Systems  Constitutional, HEENT, cardiovascular, pulmonary, GI, , musculoskeletal, neuro, skin, endocrine and psych systems are negative, except as otherwise noted.    Objective    /66   Pulse 91   Temp 98.5  F (36.9  C) (Oral)   Resp 20   Ht 1.93 m (6' 4\")   Wt 104.5 kg (230 lb 6.4 oz)   SpO2 98%   BMI 28.05 kg/m     Estimated body mass index is 28.05 kg/m  as calculated from the following:    Height as of this encounter: 1.93 m (6' 4\").    Weight as of this encounter: 104.5 kg (230 lb 6.4 oz).  Physical Exam  GENERAL: alert and no distress  EYES: Eyes grossly normal to " "inspection, PERRL and conjunctivae and sclerae normal  HENT: ear canals and TM's normal, nose and mouth without ulcers or lesions  NECK: no adenopathy, no asymmetry, masses, or scars  RESP: lungs clear to auscultation - no rales, rhonchi or wheezes  CV: regular rate and rhythm, normal S1 S2, no S3 or S4, no murmur, click or rub, no peripheral edema  ABDOMEN: soft, nontender, no hepatosplenomegaly, no masses and bowel sounds normal  MS: no gross musculoskeletal defects noted, no edema  SKIN: no suspicious lesions or rashes  NEURO: Normal strength and tone, mentation intact and speech normal  PSYCH: mentation appears normal, affect normal/bright    No results for input(s): \"HGB\", \"PLT\", \"INR\", \"NA\", \"POTASSIUM\", \"CR\", \"A1C\" in the last 8760 hours.     Diagnostics  Labs pending at this time.  Results will be reviewed when available.   EKG: appears normal, NSR, normal axis, normal intervals, no acute ST/T changes c/w ischemia, no LVH by voltage criteria, unchanged from previous tracings    Revised Cardiac Risk Index (RCRI)  The patient has the following serious cardiovascular risks for perioperative complications:   - No serious cardiac risks = 0 points     RCRI Interpretation: 0 points: Class I (very low risk - 0.4% complication rate)       Sabrina Rivera was seen today for pre-op exam.    Diagnoses and all orders for this visit:    Preop general physical exam  -     Hemoglobin; Future  -     Platelet count; Future  -     Basic metabolic panel  (Ca, Cl, CO2, Creat, Gluc, K, Na, BUN); Future  -     EKG 12-lead complete w/read - Clinics    Primary osteoarthritis of right hip    Screening for diabetes mellitus  -     Hemoglobin A1c; Future    Rash  -     clotrimazole-betamethasone (LOTRISONE) 1-0.05 % external lotion; Apply topically 2 times daily.    Primary insomnia  -     traZODone (DESYREL) 50 MG tablet; Take 1-2 tablets ( mg) by mouth at bedtime.    Other orders  -     REVIEW OF HEALTH MAINTENANCE PROTOCOL " AMANDA Rivera is cleared for his surgery and appropriate anesthesia.    Signed Electronically by: Zach Ochoa PA-C  A copy of this evaluation report is provided to the requesting physician.

## 2025-02-24 NOTE — PROGRESS NOTES
Ortho Navigator Note      Pre-op Date 2/20/25     Medical Clearance  CLEARED     Labs WNL      COVID Test Date No longer indicated      Skin  Intact      Activity: Ambulates independently      Equipment Need Patient will NOT likely need a walker for discharge. Defer to PT/OT for recs.       Meds to Hold Held all supplements 14 days prior to surgery  Hold Aspirin 7 days prior to surgery       * Medication recommendations are not intended to be exhaustive; they are limited to common medications that are potentially dangerous if incorrectly managed   NPO Instructions  Instructed to stop solids 8 hr prior to arrival and clears 2 hr prior to arrival.       Pre-op Joint Education Complete? Education completed over the phone.    Discharge Plan Patient has plan to discharge home on morning of POD 1.   Spouse Srinivasan will arrive at hospital at 0800 to participate in therapy and discharge education. They will then transport patient home    /Transportation Spouse Srinivasan will be  and transportation.  is physically able to care for patient.      Barriers to Discharge No barriers to discharge.      Additional Info/   Special Needs : -ALLERGY TO OXYCODONE        Post Op PT TBD             02/24/25 1133   Discharge Planning   Patient/Family Anticipates Transition to home   Concerns to be Addressed denies needs/concerns at this time   Living Arrangements   People in Home spouse   Type of Residence Private Residence   Is your private residence a single family home or apartment? Single family home   Number of Stairs, Within Home, Primary two   Stair Railings, Within Home, Primary none;other (see comments)  (Barn Door Handle that he can grab onto.)   Once home, are you able to live on one level? Yes   Which level? Main Level   Bathroom Shower/Tub Tub/Shower unit   Equipment Currently Used at Home wheelchair, manual;raised toilet seat   Support System   Support Systems Spouse/Significant Other   Do you have someone available  to stay with you one or two nights once you are home? Yes   Medical Clearance   Date of Physical 02/20/25   [facility]   It is recommended that you call and check with any specialty providers before surgery to see if you need surgical clearance.  Do you see any specialty providers outside of your primary care provider? No   Blood   Known Bleeding Disorder or Coagulopathy No   Does the patient have any Mandaeism/cultural preferences related to blood products? No   Education   Has the patient scheduled or completed pre-op total joint education, either in class or online, in the last 12 months? No   Patient attended total joint pre-op class/received pre-op teaching  email/phone call   Relationship/Environment   Name(s) of People in Home Spouse Srinivasan

## 2025-02-25 ENCOUNTER — TELEPHONE (OUTPATIENT)
Dept: FAMILY MEDICINE | Facility: CLINIC | Age: 70
End: 2025-02-25

## 2025-02-25 NOTE — TELEPHONE ENCOUNTER
Prior Authorization Retail Medication Request    Medication/Dose: clotrimazole-betamethasone (LOTRISONE) 1-0.05 % external lotion  Diagnosis and ICD code (if different than what is on RX):  Rash [R21]   New/renewal/insurance change PA/secondary ins. PA:  Previously Tried and Failed:    Rationale:      Insurance   Primary: [facility]  Insurance ID:  [id number]     Secondary (if applicable):  Insurance ID:      Pharmacy Information (if different than what is on RX)  Name:    Phone:    Fax:    Clinic Information  Preferred routing pool for dept communication:

## 2025-02-27 NOTE — TELEPHONE ENCOUNTER
Retail Pharmacy Prior Authorization Team   Phone: [phone]        [facility] KEY:  (Key: [id number])    PA Initiation    Medication: CLOTRIMAZOLE-BETAMETHASONE 1-0.05 % EX LOTN  Insurance Company: [employer] - Phone [phone] Fax [phone]  Pharmacy Filling the Rx: [id number] - [place], [address]  Filling Pharmacy Phone: [phone]  Filling Pharmacy Fax: [phone]  Start Date: 2/27/2025

## 2025-03-01 ENCOUNTER — HEALTH MAINTENANCE LETTER (OUTPATIENT)
Age: 70
End: 2025-03-01

## 2025-03-03 ENCOUNTER — ANESTHESIA EVENT (OUTPATIENT)
Dept: SURGERY | Facility: CLINIC | Age: 70
End: 2025-03-03
Payer: COMMERCIAL

## 2025-03-03 RX ORDER — FLUOCINONIDE 0.5 MG/G
OINTMENT TOPICAL DAILY
COMMUNITY

## 2025-03-03 RX ORDER — ATORVASTATIN CALCIUM 40 MG/1
40 TABLET, FILM COATED ORAL EVERY EVENING
COMMUNITY

## 2025-03-03 ASSESSMENT — LIFESTYLE VARIABLES: TOBACCO_USE: 1

## 2025-03-03 NOTE — PROVIDER NOTIFICATION
03/03/25 1427   Discharge Planning   Patient/Family Anticipates Transition to home with family   Concerns to be Addressed no discharge needs identified   Living Arrangements   People in Home spouse   Type of Residence Private Residence   Is your private residence a single family home or apartment? Single family home   Number of Stairs, Within Home, Primary two   Stair Railings, Within Home, Primary none   Once home, are you able to live on one level? Yes   Which level? Main Level   Bathroom Shower/Tub Tub/Shower unit   Equipment Currently Used at Home wheelchair, manual;raised toilet seat;walker, standard   Support System   Support Systems Spouse/Significant Other;Children   Do you have someone available to stay with you one or two nights once you are home? Yes   Medical Clearance   Date of Physical 02/20/25   It is recommended that you call and check with any specialty providers before surgery to see if you need surgical clearance.  Do you see any specialty providers outside of your primary care provider? No   Blood   Known Bleeding Disorder or Coagulopathy No   Does the patient have any Latter day/cultural preferences related to blood products? No   Education   Has the patient scheduled or completed pre-op total joint education, either in class or online, in the last 12 months? Yes   Patient attended total joint pre-op class/received pre-op teaching  online   Falls Risk   Has the patient been identified as a high falls risk before surgery? (fallen in the last 6 months, history of falls, unsteady gait, or balance issues) No   Did an order get placed to attend outpatient pre-surgery balance evaluation? No

## 2025-03-03 NOTE — ANESTHESIA PREPROCEDURE EVALUATION
Anesthesia Pre-Procedure Evaluation    Patient: Paulette Dorsey   MRN: [id number] : 1955        Procedure : Procedure(s):  ARTHROPLASTY, right HIP, TOTAL, DIRECT ANTERIOR APPROACH          Past Medical History:   Diagnosis Date    Arthritis     Colon cancer (H)     Dr. King Noe, oncology [facility].  Had surgery and chemo    Combined forms of age-related cataract, mild, of both eyes 2021    GERD (gastroesophageal reflux disease)     High cholesterol     Peripheral arterial disease     popliteal aneurysm [place]      Past Surgical History:   Procedure Laterality Date    ABDOMEN SURGERY  aug 2025    Colon Cancer    COLECTOMY  2005    partial colectomy    COLONOSCOPY  Mar 2024    HERNIA REPAIR, INGUINAL RT/LT      Hernia Repair, Femoral RT    SURGICAL HISTORY OF -       left popliteal bypass secondary to aneurysm    VASCULAR SURGERY  2006    Aneurysm popliteal Left      Allergies   Allergen Reactions    Buspirone Other (See Comments)     Feels like he is going to jump out of his skin    Hydrocodone Nausea and Vomiting and Nausea     Other reaction(s): Sweating, Nausea  Nausea and sweats      Oxycodone Other (See Comments)     Tylenol/Acetaminophen is OK.  Sweating, pins and needles.  Nightmares    Oxycodone-Acetaminophen Nausea and Vomiting     Other reaction(s): Finding of vomiting      Social History     Tobacco Use    Smoking status: Former     Current packs/day: 0.00     Types: Cigars, Cigarettes     Quit date: 2005     Years since quittin.1     Passive exposure: Past    Smokeless tobacco: Never    Tobacco comments:     Very occasional   Substance Use Topics    Alcohol use: Yes     Comment: little bit on weekends      Wt Readings from Last 1 Encounters:   25 104.5 kg (230 lb 6.4 oz)        Anesthesia Evaluation   Pt has had prior anesthetic. Type: General and MAC.        ROS/MED HX  ENT/Pulmonary:     (+)                tobacco use, Past use,                       Neurologic:  "      Cardiovascular:     (+) Dyslipidemia - -  CAD -  - -                                 Previous cardiac testing   Echo: Date: Results:    Stress Test:  Date: Results:    ECG Reviewed:  Date: 2/20/25 Results:  Sinus Rhythm -occasional ectopic ventricular beat    -RSR(V1) -nondiagnostic.    PROBABLY NORMAL  Cath:  Date: Results:      METS/Exercise Tolerance:     Hematologic:       Musculoskeletal:   (+)  arthritis,             GI/Hepatic:     (+) GERD,                   Renal/Genitourinary:       Endo:       Psychiatric/Substance Use:       Infectious Disease:       Malignancy:   (+) Malignancy, History of GI.    Other:            Physical Exam    Airway  airway exam normal           Respiratory Devices and Support         Dental       (+) Modest Abnormalities - crowns, retainers, 1 or 2 missing teeth      Cardiovascular   cardiovascular exam normal          Pulmonary   pulmonary exam normal                OUTSIDE LABS:  CBC:   Lab Results   Component Value Date    WBC 9.0 12/01/2022    WBC 11.4 (H) 11/11/2022    HGB 15.5 02/20/2025    HGB 13.8 12/01/2022    HCT 40.7 12/01/2022    HCT 39.5 (L) 11/11/2022     02/20/2025     12/01/2022     BMP:   Lab Results   Component Value Date     02/20/2025     10/24/2022    POTASSIUM 3.6 02/20/2025    POTASSIUM 3.8 10/24/2022    CHLORIDE 103 02/20/2025    CHLORIDE 108 10/24/2022    CO2 25 02/20/2025    CO2 28 10/24/2022    BUN 13.6 02/20/2025    BUN 12 10/24/2022    CR 0.89 02/20/2025    CR 0.90 10/24/2022    GLC 99 02/20/2025     (H) 10/24/2022     COAGS: No results found for: \"PTT\", \"INR\", \"FIBR\"  POC: No results found for: \"BGM\", \"HCG\", \"HCGS\"  HEPATIC:   Lab Results   Component Value Date    ALBUMIN 3.1 (L) 10/24/2022    PROTTOTAL 7.0 10/24/2022    ALT 50 10/24/2022    AST 30 10/24/2022    ALKPHOS 97 10/24/2022    BILITOTAL 0.4 10/24/2022     OTHER:   Lab Results   Component Value Date    A1C 5.6 02/20/2025    PAUL 9.8 02/20/2025    CRP 5.9 " "12/01/2022    SED 7 12/01/2022       Anesthesia Plan    ASA Status:  3    NPO Status:  NPO Appropriate    Anesthesia Type: Spinal.      Maintenance: Balanced.        Consents    Anesthesia Plan(s) and associated risks, benefits, and realistic alternatives discussed. Questions answered and patient/representative(s) expressed understanding.     - Discussed:     - Discussed with:  Patient, Spouse            Postoperative Care            Comments:               CHADWICK Glass CRNA    I have reviewed the pertinent notes and labs in the chart from the past 30 days and (re)examined the patient.  Any updates or changes from those notes are reflected in this note.    Clinically Significant Risk Factors Present on Admission                             # Overweight: Estimated body mass index is 28.05 kg/m  as calculated from the following:    Height as of 2/20/25: 1.93 m (6' 4\").    Weight as of 2/20/25: 104.5 kg (230 lb 6.4 oz).                "

## 2025-03-03 NOTE — TELEPHONE ENCOUNTER
"Retail Pharmacy Prior Authorization Team   Phone: [phone]      PRIOR AUTHORIZATION DENIED    Medication: CLOTRIMAZOLE-BETAMETHASONE 1-0.05 % EX LOTN  Insurance Company: [employer] - Phone [phone] Fax [phone]  Denial Date: 3/2/2025  Denial Reason(s): The records sent to us by your doctor or health care provider did not meet the criteria to approve. The drug must be used for a medically accepted indication. Your doctor has asked for clotrimazole/betamethasone lotion for rash. The drug has not been approved by the [place] Food and Drug Administration (FDA) for the treatment of this health issue. Also, this drug is not an appropriate treatment choice for this health issue, per the Medicare-approved drug information compendia.    Appeal Information: To send an appeal to the patient's insurance, please provide a letter of medical necessity for the requested medication that was denied.  Please use the denial rationale from the patient's insurance to write the letter.  Once it is completed, please have it available under the \"letters tab\" in the patient's chart and route directly back to me: JULIO GUSTAFSON and I can send the appeal to the patient's insurance.     Patient Notified: No. The [employer] [facility] PA Team does not notify of the denial outcomes as the patient often will ask what their provider will prescribe in place of the denied medication, or additional information in regards to other therapies they can take in place of the denied medication.  This is not something we can advise on in our department.    "

## 2025-03-04 ENCOUNTER — ANESTHESIA (OUTPATIENT)
Dept: SURGERY | Facility: CLINIC | Age: 70
End: 2025-03-04
Payer: COMMERCIAL

## 2025-03-04 ENCOUNTER — APPOINTMENT (OUTPATIENT)
Dept: GENERAL RADIOLOGY | Facility: CLINIC | Age: 70
End: 2025-03-04
Attending: ORTHOPAEDIC SURGERY
Payer: COMMERCIAL

## 2025-03-04 ENCOUNTER — HOSPITAL ENCOUNTER (OUTPATIENT)
Facility: CLINIC | Age: 70
Discharge: HOME | End: 2025-03-05
Attending: ORTHOPAEDIC SURGERY | Admitting: ORTHOPAEDIC SURGERY
Payer: COMMERCIAL

## 2025-03-04 DIAGNOSIS — Z96.641 STATUS POST RIGHT HIP REPLACEMENT: Primary | ICD-10-CM

## 2025-03-04 PROBLEM — M16.11 PRIMARY OSTEOARTHRITIS OF RIGHT HIP: Status: ACTIVE | Noted: 2025-03-04

## 2025-03-04 LAB
GLUCOSE BLDC GLUCOMTR-MCNC: 131 MG/DL (ref 70–99)
GLUCOSE BLDC GLUCOMTR-MCNC: 94 MG/DL (ref 70–99)

## 2025-03-04 PROCEDURE — 250N000011 HC RX IP 250 OP 636: Performed by: ORTHOPAEDIC SURGERY

## 2025-03-04 PROCEDURE — 250N000009 HC RX 250: Performed by: ORTHOPAEDIC SURGERY

## 2025-03-04 PROCEDURE — 250N000011 HC RX IP 250 OP 636: Performed by: NURSE ANESTHETIST, CERTIFIED REGISTERED

## 2025-03-04 PROCEDURE — 999N000141 HC STATISTIC PRE-PROCEDURE NURSING ASSESSMENT: Performed by: ORTHOPAEDIC SURGERY

## 2025-03-04 PROCEDURE — 82962 GLUCOSE BLOOD TEST: CPT

## 2025-03-04 PROCEDURE — C1776 JOINT DEVICE (IMPLANTABLE): HCPCS | Performed by: ORTHOPAEDIC SURGERY

## 2025-03-04 PROCEDURE — 250N000011 HC RX IP 250 OP 636: Mod: JZ | Performed by: ORTHOPAEDIC SURGERY

## 2025-03-04 PROCEDURE — 250N000013 HC RX MED GY IP 250 OP 250 PS 637: Performed by: NURSE ANESTHETIST, CERTIFIED REGISTERED

## 2025-03-04 PROCEDURE — 250N000009 HC RX 250: Performed by: NURSE ANESTHETIST, CERTIFIED REGISTERED

## 2025-03-04 PROCEDURE — 258N000003 HC RX IP 258 OP 636: Performed by: NURSE ANESTHETIST, CERTIFIED REGISTERED

## 2025-03-04 PROCEDURE — 258N000003 HC RX IP 258 OP 636: Performed by: ORTHOPAEDIC SURGERY

## 2025-03-04 PROCEDURE — 370N000017 HC ANESTHESIA TECHNICAL FEE, PER MIN: Performed by: ORTHOPAEDIC SURGERY

## 2025-03-04 PROCEDURE — 999N000065 XR PELVIS PORT 1/2 VIEWS

## 2025-03-04 PROCEDURE — 27130 TOTAL HIP ARTHROPLASTY: CPT | Mod: RT | Performed by: ORTHOPAEDIC SURGERY

## 2025-03-04 PROCEDURE — 360N000084 HC SURGERY LEVEL 4 W/ FLUORO, PER MIN: Performed by: ORTHOPAEDIC SURGERY

## 2025-03-04 PROCEDURE — 271N000001 HC OR GENERAL SUPPLY NON-STERILE: Performed by: ORTHOPAEDIC SURGERY

## 2025-03-04 PROCEDURE — 250N000013 HC RX MED GY IP 250 OP 250 PS 637: Performed by: ORTHOPAEDIC SURGERY

## 2025-03-04 PROCEDURE — 999N000179 XR SURGERY CARM FLUORO LESS THAN 5 MIN W STILLS

## 2025-03-04 PROCEDURE — 250N000011 HC RX IP 250 OP 636: Mod: JZ | Performed by: NURSE ANESTHETIST, CERTIFIED REGISTERED

## 2025-03-04 PROCEDURE — 710N000009 HC RECOVERY PHASE 1, LEVEL 1, PER MIN: Performed by: ORTHOPAEDIC SURGERY

## 2025-03-04 PROCEDURE — 272N000001 HC OR GENERAL SUPPLY STERILE: Performed by: ORTHOPAEDIC SURGERY

## 2025-03-04 DEVICE — IMP LINER HIP DEPUY PINNACLE ALTRX 36X56MM +4 1221-36-456: Type: IMPLANTABLE DEVICE | Site: HIP | Status: FUNCTIONAL

## 2025-03-04 DEVICE — IMPLANTABLE DEVICE: Type: IMPLANTABLE DEVICE | Site: HIP | Status: FUNCTIONAL

## 2025-03-04 RX ORDER — HYDROMORPHONE HYDROCHLORIDE 2 MG/1
4 TABLET ORAL EVERY 4 HOURS PRN
Status: DISCONTINUED | OUTPATIENT
Start: 2025-03-04 | End: 2025-03-05 | Stop reason: HOSPADM

## 2025-03-04 RX ORDER — ONDANSETRON 4 MG/1
4 TABLET, ORALLY DISINTEGRATING ORAL EVERY 30 MIN PRN
Status: DISCONTINUED | OUTPATIENT
Start: 2025-03-04 | End: 2025-03-04 | Stop reason: HOSPADM

## 2025-03-04 RX ORDER — SODIUM CHLORIDE, SODIUM LACTATE, POTASSIUM CHLORIDE, CALCIUM CHLORIDE 600; 310; 30; 20 MG/100ML; MG/100ML; MG/100ML; MG/100ML
INJECTION, SOLUTION INTRAVENOUS CONTINUOUS
Status: DISCONTINUED | OUTPATIENT
Start: 2025-03-04 | End: 2025-03-05 | Stop reason: HOSPADM

## 2025-03-04 RX ORDER — ONDANSETRON 4 MG/1
4 TABLET, ORALLY DISINTEGRATING ORAL EVERY 30 MIN PRN
Status: CANCELLED | OUTPATIENT
Start: 2025-03-04

## 2025-03-04 RX ORDER — OXYCODONE HYDROCHLORIDE 5 MG/1
5 TABLET ORAL
Status: DISCONTINUED | OUTPATIENT
Start: 2025-03-04 | End: 2025-03-04 | Stop reason: HOSPADM

## 2025-03-04 RX ORDER — CEFAZOLIN SODIUM/WATER 2 G/20 ML
2 SYRINGE (ML) INTRAVENOUS
Status: COMPLETED | OUTPATIENT
Start: 2025-03-04 | End: 2025-03-04

## 2025-03-04 RX ORDER — DEXAMETHASONE SODIUM PHOSPHATE 4 MG/ML
4 INJECTION, SOLUTION INTRA-ARTICULAR; INTRALESIONAL; INTRAMUSCULAR; INTRAVENOUS; SOFT TISSUE
Status: DISCONTINUED | OUTPATIENT
Start: 2025-03-04 | End: 2025-03-04 | Stop reason: HOSPADM

## 2025-03-04 RX ORDER — PROPOFOL 10 MG/ML
INJECTION, EMULSION INTRAVENOUS CONTINUOUS PRN
Status: DISCONTINUED | OUTPATIENT
Start: 2025-03-04 | End: 2025-03-04

## 2025-03-04 RX ORDER — TRANEXAMIC ACID 650 MG/1
1950 TABLET ORAL ONCE
Status: COMPLETED | OUTPATIENT
Start: 2025-03-04 | End: 2025-03-04

## 2025-03-04 RX ORDER — ONDANSETRON 2 MG/ML
4 INJECTION INTRAMUSCULAR; INTRAVENOUS EVERY 30 MIN PRN
Status: CANCELLED | OUTPATIENT
Start: 2025-03-04

## 2025-03-04 RX ORDER — PROCHLORPERAZINE MALEATE 5 MG/1
5 TABLET ORAL EVERY 6 HOURS PRN
Status: DISCONTINUED | OUTPATIENT
Start: 2025-03-04 | End: 2025-03-05 | Stop reason: HOSPADM

## 2025-03-04 RX ORDER — ASPIRIN 325 MG
325 TABLET, DELAYED RELEASE (ENTERIC COATED) ORAL DAILY
Status: DISCONTINUED | OUTPATIENT
Start: 2025-03-04 | End: 2025-03-05 | Stop reason: HOSPADM

## 2025-03-04 RX ORDER — VANCOMYCIN HYDROCHLORIDE 1 G/20ML
INJECTION, POWDER, LYOPHILIZED, FOR SOLUTION INTRAVENOUS PRN
Status: DISCONTINUED | OUTPATIENT
Start: 2025-03-04 | End: 2025-03-04 | Stop reason: HOSPADM

## 2025-03-04 RX ORDER — NALOXONE HYDROCHLORIDE 0.4 MG/ML
0.2 INJECTION, SOLUTION INTRAMUSCULAR; INTRAVENOUS; SUBCUTANEOUS
Status: DISCONTINUED | OUTPATIENT
Start: 2025-03-04 | End: 2025-03-05 | Stop reason: HOSPADM

## 2025-03-04 RX ORDER — LIDOCAINE 40 MG/G
CREAM TOPICAL
Status: DISCONTINUED | OUTPATIENT
Start: 2025-03-04 | End: 2025-03-05 | Stop reason: HOSPADM

## 2025-03-04 RX ORDER — ONDANSETRON 4 MG/1
4 TABLET, ORALLY DISINTEGRATING ORAL EVERY 6 HOURS PRN
Status: DISCONTINUED | OUTPATIENT
Start: 2025-03-04 | End: 2025-03-05 | Stop reason: HOSPADM

## 2025-03-04 RX ORDER — BUPIVACAINE HYDROCHLORIDE 7.5 MG/ML
INJECTION, SOLUTION INTRASPINAL
Status: COMPLETED | OUTPATIENT
Start: 2025-03-04 | End: 2025-03-04

## 2025-03-04 RX ORDER — KETOROLAC TROMETHAMINE 15 MG/ML
30 INJECTION, SOLUTION INTRAMUSCULAR; INTRAVENOUS ONCE
Status: COMPLETED | OUTPATIENT
Start: 2025-03-04 | End: 2025-03-04

## 2025-03-04 RX ORDER — IBUPROFEN 200 MG
800 TABLET ORAL EVERY 8 HOURS PRN
COMMUNITY

## 2025-03-04 RX ORDER — FENTANYL CITRATE 50 UG/ML
50 INJECTION, SOLUTION INTRAMUSCULAR; INTRAVENOUS EVERY 5 MIN PRN
Status: DISCONTINUED | OUTPATIENT
Start: 2025-03-04 | End: 2025-03-04 | Stop reason: HOSPADM

## 2025-03-04 RX ORDER — HYDROMORPHONE HCL IN WATER/PF 6 MG/30 ML
0.2 PATIENT CONTROLLED ANALGESIA SYRINGE INTRAVENOUS
Status: DISCONTINUED | OUTPATIENT
Start: 2025-03-04 | End: 2025-03-05 | Stop reason: HOSPADM

## 2025-03-04 RX ORDER — HYDROXYZINE HYDROCHLORIDE 10 MG/1
10 TABLET, FILM COATED ORAL EVERY 6 HOURS PRN
Status: DISCONTINUED | OUTPATIENT
Start: 2025-03-04 | End: 2025-03-05 | Stop reason: HOSPADM

## 2025-03-04 RX ORDER — NALOXONE HYDROCHLORIDE 0.4 MG/ML
0.1 INJECTION, SOLUTION INTRAMUSCULAR; INTRAVENOUS; SUBCUTANEOUS
Status: CANCELLED | OUTPATIENT
Start: 2025-03-04

## 2025-03-04 RX ORDER — AMOXICILLIN 250 MG
1 CAPSULE ORAL 2 TIMES DAILY
Status: DISCONTINUED | OUTPATIENT
Start: 2025-03-04 | End: 2025-03-05 | Stop reason: HOSPADM

## 2025-03-04 RX ORDER — GABAPENTIN 100 MG/1
100 CAPSULE ORAL
Status: COMPLETED | OUTPATIENT
Start: 2025-03-04 | End: 2025-03-04

## 2025-03-04 RX ORDER — HYDROMORPHONE HCL IN WATER/PF 6 MG/30 ML
0.4 PATIENT CONTROLLED ANALGESIA SYRINGE INTRAVENOUS
Status: DISCONTINUED | OUTPATIENT
Start: 2025-03-04 | End: 2025-03-05 | Stop reason: HOSPADM

## 2025-03-04 RX ORDER — HYDROXYZINE HYDROCHLORIDE 10 MG/1
10 TABLET, FILM COATED ORAL EVERY 6 HOURS PRN
Qty: 30 TABLET | Refills: 0 | Status: SHIPPED | OUTPATIENT
Start: 2025-03-04

## 2025-03-04 RX ORDER — POLYETHYLENE GLYCOL 3350 17 G/17G
17 POWDER, FOR SOLUTION ORAL DAILY
Status: DISCONTINUED | OUTPATIENT
Start: 2025-03-05 | End: 2025-03-05 | Stop reason: HOSPADM

## 2025-03-04 RX ORDER — METOPROLOL TARTRATE 1 MG/ML
1-2 INJECTION, SOLUTION INTRAVENOUS EVERY 5 MIN PRN
Status: DISCONTINUED | OUTPATIENT
Start: 2025-03-04 | End: 2025-03-04 | Stop reason: HOSPADM

## 2025-03-04 RX ORDER — KETOROLAC TROMETHAMINE 15 MG/ML
15 INJECTION, SOLUTION INTRAMUSCULAR; INTRAVENOUS EVERY 6 HOURS PRN
Status: DISCONTINUED | OUTPATIENT
Start: 2025-03-04 | End: 2025-03-05 | Stop reason: HOSPADM

## 2025-03-04 RX ORDER — HYDROMORPHONE HYDROCHLORIDE 2 MG/1
2 TABLET ORAL EVERY 4 HOURS PRN
Status: DISCONTINUED | OUTPATIENT
Start: 2025-03-04 | End: 2025-03-05 | Stop reason: HOSPADM

## 2025-03-04 RX ORDER — DIPHENHYDRAMINE HCL 12.5 MG/5ML
12.5 SOLUTION ORAL EVERY 6 HOURS PRN
Status: DISCONTINUED | OUTPATIENT
Start: 2025-03-04 | End: 2025-03-05 | Stop reason: HOSPADM

## 2025-03-04 RX ORDER — HYDROMORPHONE HCL IN WATER/PF 6 MG/30 ML
0.4 PATIENT CONTROLLED ANALGESIA SYRINGE INTRAVENOUS EVERY 5 MIN PRN
Status: DISCONTINUED | OUTPATIENT
Start: 2025-03-04 | End: 2025-03-04 | Stop reason: HOSPADM

## 2025-03-04 RX ORDER — LIDOCAINE 40 MG/G
CREAM TOPICAL
Status: DISCONTINUED | OUTPATIENT
Start: 2025-03-04 | End: 2025-03-04 | Stop reason: HOSPADM

## 2025-03-04 RX ORDER — ROPIVACAINE HYDROCHLORIDE 5 MG/ML
INJECTION, SOLUTION EPIDURAL; INFILTRATION; PERINEURAL PRN
Status: DISCONTINUED | OUTPATIENT
Start: 2025-03-04 | End: 2025-03-04

## 2025-03-04 RX ORDER — ATORVASTATIN CALCIUM 20 MG/1
40 TABLET, FILM COATED ORAL EVERY EVENING
Status: DISCONTINUED | OUTPATIENT
Start: 2025-03-04 | End: 2025-03-05 | Stop reason: HOSPADM

## 2025-03-04 RX ORDER — BISACODYL 10 MG
10 SUPPOSITORY, RECTAL RECTAL DAILY PRN
Status: DISCONTINUED | OUTPATIENT
Start: 2025-03-07 | End: 2025-03-05 | Stop reason: HOSPADM

## 2025-03-04 RX ORDER — SODIUM CHLORIDE, SODIUM LACTATE, POTASSIUM CHLORIDE, CALCIUM CHLORIDE 600; 310; 30; 20 MG/100ML; MG/100ML; MG/100ML; MG/100ML
INJECTION, SOLUTION INTRAVENOUS CONTINUOUS
Status: DISCONTINUED | OUTPATIENT
Start: 2025-03-04 | End: 2025-03-04 | Stop reason: HOSPADM

## 2025-03-04 RX ORDER — EPINEPHRINE 1 MG/ML
INJECTION, SOLUTION, CONCENTRATE INTRAVENOUS
Status: COMPLETED | OUTPATIENT
Start: 2025-03-04 | End: 2025-03-04

## 2025-03-04 RX ORDER — OXYCODONE HYDROCHLORIDE 5 MG/1
10 TABLET ORAL
Status: CANCELLED | OUTPATIENT
Start: 2025-03-04

## 2025-03-04 RX ORDER — FENTANYL CITRATE 50 UG/ML
INJECTION, SOLUTION INTRAMUSCULAR; INTRAVENOUS PRN
Status: DISCONTINUED | OUTPATIENT
Start: 2025-03-04 | End: 2025-03-04

## 2025-03-04 RX ORDER — ACETAMINOPHEN 325 MG/1
650 TABLET ORAL EVERY 4 HOURS PRN
Qty: 100 TABLET | Refills: 0 | Status: SHIPPED | OUTPATIENT
Start: 2025-03-04

## 2025-03-04 RX ORDER — GLYCOPYRROLATE 0.2 MG/ML
INJECTION, SOLUTION INTRAMUSCULAR; INTRAVENOUS PRN
Status: DISCONTINUED | OUTPATIENT
Start: 2025-03-04 | End: 2025-03-04

## 2025-03-04 RX ORDER — VITAMIN B COMPLEX
1 TABLET ORAL DAILY
COMMUNITY
Start: 2024-02-06

## 2025-03-04 RX ORDER — HYDROXYZINE HYDROCHLORIDE 10 MG/1
10 TABLET, FILM COATED ORAL EVERY 6 HOURS PRN
Status: DISCONTINUED | OUTPATIENT
Start: 2025-03-04 | End: 2025-03-04 | Stop reason: HOSPADM

## 2025-03-04 RX ORDER — TRAZODONE HYDROCHLORIDE 50 MG/1
50-100 TABLET ORAL AT BEDTIME
Status: DISCONTINUED | OUTPATIENT
Start: 2025-03-04 | End: 2025-03-05 | Stop reason: HOSPADM

## 2025-03-04 RX ORDER — IBUPROFEN 600 MG/1
600 TABLET, FILM COATED ORAL EVERY 6 HOURS PRN
Status: DISCONTINUED | OUTPATIENT
Start: 2025-03-09 | End: 2025-03-05 | Stop reason: HOSPADM

## 2025-03-04 RX ORDER — ASPIRIN 325 MG
325 TABLET, DELAYED RELEASE (ENTERIC COATED) ORAL DAILY
Qty: 30 TABLET | Refills: 0 | Status: SHIPPED | OUTPATIENT
Start: 2025-03-04

## 2025-03-04 RX ORDER — METHOCARBAMOL 500 MG/1
250 TABLET ORAL EVERY 6 HOURS PRN
Status: DISCONTINUED | OUTPATIENT
Start: 2025-03-04 | End: 2025-03-05 | Stop reason: HOSPADM

## 2025-03-04 RX ORDER — DEXAMETHASONE SODIUM PHOSPHATE 4 MG/ML
4 INJECTION, SOLUTION INTRA-ARTICULAR; INTRALESIONAL; INTRAMUSCULAR; INTRAVENOUS; SOFT TISSUE
Status: CANCELLED | OUTPATIENT
Start: 2025-03-04

## 2025-03-04 RX ORDER — ONDANSETRON 2 MG/ML
4 INJECTION INTRAMUSCULAR; INTRAVENOUS EVERY 30 MIN PRN
Status: DISCONTINUED | OUTPATIENT
Start: 2025-03-04 | End: 2025-03-04 | Stop reason: HOSPADM

## 2025-03-04 RX ORDER — ACETAMINOPHEN 325 MG/1
975 TABLET ORAL EVERY 8 HOURS
Status: DISCONTINUED | OUTPATIENT
Start: 2025-03-04 | End: 2025-03-05 | Stop reason: HOSPADM

## 2025-03-04 RX ORDER — DEXAMETHASONE SODIUM PHOSPHATE 4 MG/ML
INJECTION, SOLUTION INTRA-ARTICULAR; INTRALESIONAL; INTRAMUSCULAR; INTRAVENOUS; SOFT TISSUE PRN
Status: DISCONTINUED | OUTPATIENT
Start: 2025-03-04 | End: 2025-03-04

## 2025-03-04 RX ORDER — AMOXICILLIN 250 MG
1-2 CAPSULE ORAL 2 TIMES DAILY
Qty: 30 TABLET | Refills: 0 | Status: SHIPPED | OUTPATIENT
Start: 2025-03-04

## 2025-03-04 RX ORDER — CEFAZOLIN SODIUM/WATER 2 G/20 ML
2 SYRINGE (ML) INTRAVENOUS SEE ADMIN INSTRUCTIONS
Status: DISCONTINUED | OUTPATIENT
Start: 2025-03-04 | End: 2025-03-04 | Stop reason: HOSPADM

## 2025-03-04 RX ORDER — CEFAZOLIN 2 G/1
2 INJECTION, POWDER, FOR SOLUTION INTRAVENOUS EVERY 8 HOURS
Status: COMPLETED | OUTPATIENT
Start: 2025-03-04 | End: 2025-03-05

## 2025-03-04 RX ORDER — NALOXONE HYDROCHLORIDE 0.4 MG/ML
0.4 INJECTION, SOLUTION INTRAMUSCULAR; INTRAVENOUS; SUBCUTANEOUS
Status: DISCONTINUED | OUTPATIENT
Start: 2025-03-04 | End: 2025-03-05 | Stop reason: HOSPADM

## 2025-03-04 RX ORDER — ACETAMINOPHEN 325 MG/1
975 TABLET ORAL ONCE
Status: COMPLETED | OUTPATIENT
Start: 2025-03-04 | End: 2025-03-04

## 2025-03-04 RX ORDER — METOPROLOL TARTRATE 1 MG/ML
INJECTION, SOLUTION INTRAVENOUS PRN
Status: DISCONTINUED | OUTPATIENT
Start: 2025-03-04 | End: 2025-03-04

## 2025-03-04 RX ORDER — ONDANSETRON 2 MG/ML
4 INJECTION INTRAMUSCULAR; INTRAVENOUS EVERY 6 HOURS PRN
Status: DISCONTINUED | OUTPATIENT
Start: 2025-03-04 | End: 2025-03-05 | Stop reason: HOSPADM

## 2025-03-04 RX ORDER — NALOXONE HYDROCHLORIDE 0.4 MG/ML
0.1 INJECTION, SOLUTION INTRAMUSCULAR; INTRAVENOUS; SUBCUTANEOUS
Status: DISCONTINUED | OUTPATIENT
Start: 2025-03-04 | End: 2025-03-04 | Stop reason: HOSPADM

## 2025-03-04 RX ORDER — HYDROMORPHONE HYDROCHLORIDE 2 MG/1
2-4 TABLET ORAL EVERY 6 HOURS PRN
Qty: 15 TABLET | Refills: 0 | Status: SHIPPED | OUTPATIENT
Start: 2025-03-04

## 2025-03-04 RX ADMIN — MIDAZOLAM 2 MG: 1 INJECTION INTRAMUSCULAR; INTRAVENOUS at 10:03

## 2025-03-04 RX ADMIN — ACETAMINOPHEN 975 MG: 325 TABLET, FILM COATED ORAL at 09:06

## 2025-03-04 RX ADMIN — MIDAZOLAM 2 MG: 1 INJECTION INTRAMUSCULAR; INTRAVENOUS at 10:36

## 2025-03-04 RX ADMIN — KETOROLAC TROMETHAMINE 15 MG: 15 INJECTION, SOLUTION INTRAMUSCULAR; INTRAVENOUS at 21:53

## 2025-03-04 RX ADMIN — HYDROMORPHONE HYDROCHLORIDE 0.2 MG: 0.2 INJECTION, SOLUTION INTRAMUSCULAR; INTRAVENOUS; SUBCUTANEOUS at 15:23

## 2025-03-04 RX ADMIN — ROPIVACAINE HYDROCHLORIDE 20 ML: 5 INJECTION, SOLUTION EPIDURAL; INFILTRATION; PERINEURAL at 10:22

## 2025-03-04 RX ADMIN — METHOCARBAMOL 250 MG: 500 TABLET ORAL at 16:51

## 2025-03-04 RX ADMIN — TRANEXAMIC ACID 1 G: 1 INJECTION, SOLUTION INTRAVENOUS at 11:22

## 2025-03-04 RX ADMIN — PROPOFOL 50 MCG/KG/MIN: 10 INJECTION, EMULSION INTRAVENOUS at 10:45

## 2025-03-04 RX ADMIN — PHENYLEPHRINE HYDROCHLORIDE 200 MCG: 10 INJECTION INTRAVENOUS at 13:48

## 2025-03-04 RX ADMIN — PHENYLEPHRINE HYDROCHLORIDE 200 MCG: 10 INJECTION INTRAVENOUS at 13:11

## 2025-03-04 RX ADMIN — PHENYLEPHRINE HYDROCHLORIDE 200 MCG: 10 INJECTION INTRAVENOUS at 13:09

## 2025-03-04 RX ADMIN — ATORVASTATIN CALCIUM 40 MG: 20 TABLET, FILM COATED ORAL at 19:53

## 2025-03-04 RX ADMIN — MIDAZOLAM 2 MG: 1 INJECTION INTRAMUSCULAR; INTRAVENOUS at 13:51

## 2025-03-04 RX ADMIN — ROPIVACAINE HYDROCHLORIDE 10 ML: 5 INJECTION, SOLUTION EPIDURAL; INFILTRATION; PERINEURAL at 10:28

## 2025-03-04 RX ADMIN — GABAPENTIN 100 MG: 100 CAPSULE ORAL at 09:07

## 2025-03-04 RX ADMIN — PHENYLEPHRINE HYDROCHLORIDE 200 MCG: 10 INJECTION INTRAVENOUS at 13:02

## 2025-03-04 RX ADMIN — DEXAMETHASONE SODIUM PHOSPHATE 10 MG: 4 INJECTION, SOLUTION INTRA-ARTICULAR; INTRALESIONAL; INTRAMUSCULAR; INTRAVENOUS; SOFT TISSUE at 10:22

## 2025-03-04 RX ADMIN — SODIUM CHLORIDE, SODIUM LACTATE, POTASSIUM CHLORIDE, AND CALCIUM CHLORIDE: .6; .31; .03; .02 INJECTION, SOLUTION INTRAVENOUS at 17:14

## 2025-03-04 RX ADMIN — TRANEXAMIC ACID 1950 MG: 650 TABLET ORAL at 09:07

## 2025-03-04 RX ADMIN — FENTANYL CITRATE 100 MCG: 50 INJECTION INTRAMUSCULAR; INTRAVENOUS at 10:46

## 2025-03-04 RX ADMIN — HYDROMORPHONE HYDROCHLORIDE 0.2 MG: 0.2 INJECTION, SOLUTION INTRAMUSCULAR; INTRAVENOUS; SUBCUTANEOUS at 15:35

## 2025-03-04 RX ADMIN — SENNOSIDES AND DOCUSATE SODIUM 1 TABLET: 50; 8.6 TABLET ORAL at 19:52

## 2025-03-04 RX ADMIN — PHENYLEPHRINE HYDROCHLORIDE 200 MCG: 10 INJECTION INTRAVENOUS at 13:21

## 2025-03-04 RX ADMIN — FENTANYL CITRATE 100 MCG: 50 INJECTION INTRAMUSCULAR; INTRAVENOUS at 10:03

## 2025-03-04 RX ADMIN — GLYCOPYRROLATE 0.3 MG: 0.2 INJECTION, SOLUTION INTRAMUSCULAR; INTRAVENOUS at 10:39

## 2025-03-04 RX ADMIN — ACETAMINOPHEN 975 MG: 325 TABLET, FILM COATED ORAL at 16:51

## 2025-03-04 RX ADMIN — Medication 2 G: at 13:23

## 2025-03-04 RX ADMIN — PHENYLEPHRINE HYDROCHLORIDE 100 MCG: 10 INJECTION INTRAVENOUS at 12:20

## 2025-03-04 RX ADMIN — HYDROMORPHONE HYDROCHLORIDE 0.2 MG: 0.2 INJECTION, SOLUTION INTRAMUSCULAR; INTRAVENOUS; SUBCUTANEOUS at 15:30

## 2025-03-04 RX ADMIN — BUPIVACAINE HYDROCHLORIDE IN DEXTROSE 1.7 ML: 7.5 INJECTION, SOLUTION SUBARACHNOID at 10:44

## 2025-03-04 RX ADMIN — PHENYLEPHRINE HYDROCHLORIDE 100 MCG: 10 INJECTION INTRAVENOUS at 12:27

## 2025-03-04 RX ADMIN — LIDOCAINE HYDROCHLORIDE 0.1 ML: 10 INJECTION, SOLUTION EPIDURAL; INFILTRATION; INTRACAUDAL; PERINEURAL at 09:35

## 2025-03-04 RX ADMIN — Medication 2 G: at 10:31

## 2025-03-04 RX ADMIN — SODIUM CHLORIDE, POTASSIUM CHLORIDE, SODIUM LACTATE AND CALCIUM CHLORIDE: 600; 310; 30; 20 INJECTION, SOLUTION INTRAVENOUS at 13:16

## 2025-03-04 RX ADMIN — KETOROLAC TROMETHAMINE 30 MG: 15 INJECTION, SOLUTION INTRAMUSCULAR; INTRAVENOUS at 16:17

## 2025-03-04 RX ADMIN — PHENYLEPHRINE HYDROCHLORIDE 200 MCG: 10 INJECTION INTRAVENOUS at 13:43

## 2025-03-04 RX ADMIN — METOPROLOL TARTRATE 2.5 MG: 5 INJECTION INTRAVENOUS at 12:43

## 2025-03-04 RX ADMIN — CEFAZOLIN 2 G: 2 INJECTION, POWDER, LYOPHILIZED, FOR SOLUTION INTRAVENOUS at 21:54

## 2025-03-04 RX ADMIN — PHENYLEPHRINE HYDROCHLORIDE 100 MCG: 10 INJECTION INTRAVENOUS at 12:44

## 2025-03-04 RX ADMIN — HYDROXYZINE HYDROCHLORIDE 10 MG: 10 TABLET ORAL at 16:50

## 2025-03-04 RX ADMIN — SODIUM CHLORIDE, POTASSIUM CHLORIDE, SODIUM LACTATE AND CALCIUM CHLORIDE: 600; 310; 30; 20 INJECTION, SOLUTION INTRAVENOUS at 15:00

## 2025-03-04 RX ADMIN — PHENYLEPHRINE HYDROCHLORIDE 100 MCG: 10 INJECTION INTRAVENOUS at 12:37

## 2025-03-04 RX ADMIN — PHENYLEPHRINE HYDROCHLORIDE 200 MCG: 10 INJECTION INTRAVENOUS at 13:00

## 2025-03-04 RX ADMIN — EPINEPHRINE 0.2 MG: 1 INJECTION, SOLUTION, CONCENTRATE INTRAVENOUS at 10:44

## 2025-03-04 RX ADMIN — SODIUM CHLORIDE, POTASSIUM CHLORIDE, SODIUM LACTATE AND CALCIUM CHLORIDE: 600; 310; 30; 20 INJECTION, SOLUTION INTRAVENOUS at 09:35

## 2025-03-04 ASSESSMENT — ACTIVITIES OF DAILY LIVING (ADL)
ADLS_ACUITY_SCORE: 24

## 2025-03-04 NOTE — PROGRESS NOTES
Called Cruz Brantley CRNA to the patient's bedside in PACU due to patient's hypotenstion at 1545.  Patient feeling nauseous and light-headed. Placed patient in trendelenburg position and opened up IV fluids. Rechecked BP shows improvement - see VS Flowsheet.

## 2025-03-04 NOTE — ANESTHESIA POSTPROCEDURE EVALUATION
Patient: Paulette Dorsey    Procedure: Procedure(s):  ARTHROPLASTY, right HIP, TOTAL, DIRECT ANTERIOR APPROACH       Anesthesia Type:  Spinal    Note:  Disposition: Outpatient   Postop Pain Control: Uneventful            Sign Out: Well controlled pain   PONV: No   Neuro/Psych: Uneventful            Sign Out: Acceptable/Baseline neuro status   Airway/Respiratory: Uneventful            Sign Out: Acceptable/Baseline resp. status   CV/Hemodynamics: Uneventful            Sign Out: Acceptable CV status; No obvious hypovolemia; No obvious fluid overload   Other NRE: NONE   DID A NON-ROUTINE EVENT OCCUR? No           Last vitals:  Vitals:    03/04/25 1010 03/04/25 1020 03/04/25 1030   BP: 125/74 124/75 123/82   Pulse: 74 71 79   Resp: 16 16 16   Temp:      SpO2: 98% 99% 99%       Electronically Signed By: CHADWICK Sanchez CRNA  March 4, 2025  1:57 PM

## 2025-03-04 NOTE — PROGRESS NOTES
Called Will Patterson CRNA, and spoke with him regarding patient's BP and pain level in PACU. See VS Flowsheet and MAR.  CRNA came to the patient's bedside in PACU and assessed patient.  Will treat pain as instructed by CRNA with IV Dilaudid and continue to monitor patient's BP. Patient awake and alert at this time.

## 2025-03-04 NOTE — ANESTHESIA PROCEDURE NOTES
"Other (Lateral Femoral Cutaneous Nerve) Procedure Note    Pre-Procedure   Staff -        CRNA: Cruz Brantley APRN CRNA       Performed By: CRNA       Location: pre-op       Pre-Anesthestic Checklist: patient identified, IV checked, site marked, risks and benefits discussed, informed consent, monitors and equipment checked, pre-op evaluation, at physician/surgeon's request and post-op pain management  Timeout:       Correct Patient: Yes        Correct Procedure: Yes        Correct Site: Yes        Correct Position: Yes        Correct Laterality: Yes        Site Marked: Yes  Procedure Documentation  Procedure: Other (Lateral Femoral Cutaneous Nerve)         Laterality: right       Patient Position: supine       Patient Prep/Sterile Barriers: sterile gloves, mask, patient draped       Skin prep: Chloraprep       Local skin infiltrated with 3 mL of 1% lidocaine.        Needle Type: insulated       Needle Gauge: 21.        Needle Length (millimeters): 100                 Ultrasound guided       1. Ultrasound was used to identify targeted nerve, plexus, vascular marker, or fascial plane and place a needle adjacent to it in real-time.       2. Ultrasound was used to visualize the spread of anesthetic in close proximity to the above referenced structure.       3. A permanent image is entered into the patient's record.       4. The visualized anatomic structures appeared normal.       5. There were no apparent abnormal pathologic findings.    Assessment/Narrative         The placement was negative for: blood aspirated, painful injection and site bleeding       Paresthesias: No.       Bolus given via needle. no blood aspirated via catheter.        Secured via.        Insertion/Infusion Method: Single Shot       Complications: none       Injection made incrementally with aspirations every 5 mL.      FOR [facility] ([facility]) ONLY:   Pain Team Contact information: please page the Pain Team Via [employer]. Search \"Pain\". During " daytime hours, please page the attending first. At night please page the resident first.

## 2025-03-04 NOTE — MEDICATION SCRIBE - ADMISSION MEDICATION HISTORY
Medication Scribe Admission Medication History    Admission medication history is complete. The information provided in this note is only as accurate as the sources available at the time of the update.    Information Source(s): Patient, Clinic records, and CareEverywhere/SureScripts via with patient in room and finished at desk.    Pertinent Information: [facility].  After updating [facility] list in Care Everywhere, I found that he also had an entry for Ibuprofen.  He is taking Ibuprofen at home.  I will find out from him after surgery if he has the 800 mg tab from VA list, or if he was on OTC strength.  He usually takes a 1/2 tab of the Trazodone at bedtime.    He has not received the Fluocinonide from the [facility] yet.    Changes made to PTA medication list:  Added:   Vitamin D  Ibuprofen 200 mg  Deleted: None  Changed: Finished filling in the missing information for most of the medicines.    Allergies reviewed with patient and updates made in EHR: yes,  no change.  He states that all narcotics make him ill.    Medication History Completed By: Jodi Humphrey 3/4/2025 10:22 AM    PTA Med List   Medication Sig Note Last Dose/Taking    ALPRAZolam (XANAX) 0.5 MG tablet Take 0.5 mg by mouth daily as needed.  Past Month    ASPIRIN 81 MG OR TABS Take 81 mg by mouth every evening.  Past Month Evening    atorvastatin (LIPITOR) 40 MG tablet Take 40 mg by mouth every evening.  3/3/2025 Evening    clotrimazole-betamethasone (LOTRISONE) 1-0.05 % external lotion Apply topically 2 times daily. 3/4/2025: Uses prn only. More than a month    CYCLOBENZAPRINE HCL PO Take 10 mg by mouth nightly as needed for muscle spasms.  More than a month    fluocinonide (LIDEX) 0.05 % external ointment Apply topically daily. To skin rash. 3/4/2025: [facility] not [facility] yet. Taking    ibuprofen (ADVIL/MOTRIN) 200 MG tablet Take 800 mg by mouth every 8 hours as needed for pain.  2/27/2025    OMEPRAZOLE PO Take 20 mg by mouth daily as needed.  Past  Week    traZODone (DESYREL) 50 MG tablet Take 1-2 tablets ( mg) by mouth at bedtime.  3/3/2025    Vitamin D3 (VITAMIN D, CHOLECALCIFEROL,) 25 mcg (1000 units) tablet Take 1 tablet by mouth daily.  Past Week Evening

## 2025-03-04 NOTE — ANESTHESIA PROCEDURE NOTES
"Intrathecal injection Procedure Note    Pre-Procedure   Staff -        CRNA: Will Patterson APRN CRNA       Performed By: CRNA       Location: OR       Procedure Start/Stop Times: 3/4/2025 10:37 AM and 3/4/2025 10:44 AM       Pre-Anesthestic Checklist: patient identified, IV checked, site marked, risks and benefits discussed, informed consent, monitors and equipment checked, pre-op evaluation and at physician/surgeon's request  Timeout:       Correct Patient: Yes        Correct Procedure: Yes        Correct Site: Yes        Correct Position: Yes   Procedure Documentation  Procedure: intrathecal injection         Patient Position: sitting       Patient Prep/Sterile Barriers: sterile gloves, mask, patient draped       Skin prep: Betadine       Insertion Site: L3-4. (midline approach).       Needle Gauge: 25.        Needle Length (Inches): 3.5        Spinal Needle Type: Pencan       Introducer used       Introducer: 20 G       # of attempts: 1 and  # of redirects:  0    Assessment/Narrative         Paresthesias: No.       CSF fluid: clear.    Medication(s) Administered   0.75% Hyperbaric Bupivacaine (Intrathecal) - Intrathecal   1.7 mL - 3/4/2025 10:44:00 AM  Epinephrine 1000 mcg/mL (Intrathecal) - Intrathecal   0.2 mg - 3/4/2025 10:44:00 AM  Medication Administration Time: 3/4/2025 10:37 AM      FOR [facility] ([facility]) ONLY:   Pain Team Contact information: please page the Pain Team Via [employer]. Search \"Pain\". During daytime hours, please page the attending first. At night please page the resident first.      "

## 2025-03-04 NOTE — PROGRESS NOTES
"[facility] ADMISSION NOTE    Patient admitted to room 2207 at approximately 1715 via cart from surgery. Patient was accompanied by transport tech.     Verbal SBAR report received from surgery RN prior to patient arrival.     Patient trasferred to bed via air jamar. Patient alert and oriented X 3. Pain is controlled with current analgesics.  Medication(s) being used: acetaminophen.  . Admission vital signs: Blood pressure 105/69, pulse 96, temperature 97  F (36.1  C), temperature source Axillary, resp. rate 10, height 1.93 m (6' 4\"), weight 104.3 kg (230 lb), SpO2 94%. Patient was oriented to plan of care, call light, bed controls, tv, telephone, bathroom, and visiting hours.     Risk Assessment    The following safety risks were identified during admission: fall. Yellow risk band applied: YES.     Skin Initial Assessment    This writer admitted this patient and completed a full skin assessment and Varghese score in the Adult PCS flowsheet.   Photo documentation of skin problem and/or wound competed via [employer] application (located under Media):  Yes    Appropriate interventions initiated as needed.     Secondary skin check completed by Yovany.         Education    Patient has a [place] to Observation order: No  Observation education completed and documented: N/A      Crystal Ziegler RN      "

## 2025-03-04 NOTE — INTERVAL H&P NOTE
"I have reviewed the surgical (or preoperative) H&P that is linked to this encounter, and examined the patient. There are no significant changes    Clinical Conditions Present on Arrival:  Clinically Significant Risk Factors Present on Admission                  # Drug Induced Platelet Defect: home medication list includes an antiplatelet medication      # Overweight: Estimated body mass index is 28 kg/m  as calculated from the following:    Height as of this encounter: 1.93 m (6' 4\").    Weight as of this encounter: 104.3 kg (230 lb).     The History and Physical on patient's chart was personally reviewed today with the patient. there have been no interval changes in patient's history since H+P performed.    History:  Paulette Dorsey is a 69 year old male with ongoing bilateral hip pain with no known injury.   Right more painful than the left. Pain has been present for 6 years or more.  Seen for the same 9/2023 and at that time discussed total hip arthroplasty, but he never followed through. He'd like to proceed with right total hip arthroplasty at this time.     Taking ibuprofen for pain.     Locates pain to the front of the hips, groin, but sometimes along the outer aspect. Mild pain at rest, worse with activities, standing, walking, sit to stand, bending, driving. Does have pain at night rolling side to side. Denies perceived limb length inequality.     Seen by Dr. Denise Lai, 9/19/2023, for the same.       Treatment has been cortisone injections 4/2023 with a couple of weeks relief. Therapy a few years back. Takes over the counter ibuprofen, aspirin. He doesn't tolerate opioid pain medications.     Had total hip arthroplasty scheduled in August 2023, but cancelled (at O). He and surgeon \"didn't connect\", he says      Chronic low back pain since teenager.  Left lower extremity numbness and tingling from previous popliteal artery bypass surgery. Bottoms of feet are numb from chemotherapy.     X-RAY: AP pelvis " "and AP/Lateral views bilateral hip from 1/20/2025 --- No obvious fractures or  dislocations. Advanced degenerative changes of both hip joints. Severe hip joint degenerative changes bilaterally with bone-on-bone articulation and osteophytosis. Moderate degenerative changes of the sacroiliac joints and lower lumbar spine. Osteopenia.       Impression: 68yo male with chronic bilateral hip pain, advanced primary osteoarthritis.     Plan:   * discussed pain in groin/hip likely from advanced hip arthritis. This is wearing of the cartilage within the hip joint either due to normal \"wear and tear\" or following an injury. Any low back / buttock / radiating pain likely coming from the low back.      *  treatment options for hip arthritis and pain include: do nothing, NSAIDS, activity modification, Physical Therapy, injections, total hip arthroplasty. Risks and benefits of each discussed.   * did discuss patient is a candidate for total hip arthroplasty, and offered total hip arthroplasty to patient. Total hip arthroplasty will only attempt to provide pain relief from hip related arthritis only and not any pain from the low back. Any low back pain likely to continue.  *  Discussed risks of surgery include, but not limited to: bleeding, infection, pain, scar, damage to adjacent structures (e.g. Nerves, blood vessels, bone, cartilage), temporary or permanent nerve damage, recurrence of symptoms, implant dislocation, implant failure, implant infection, unequal limb lengths, stiffness, need for further surgery, blood clots, pulmonary embolism, risks of anesthesia, and death.     * understanding the risks of surgery, as a quality of life decision, patient would like to proceed with surgery: RIGHT total hip arthroplasty.    Patient elects to proceed with planned procedure. Right total hip arthroplasty.    Risks and perceived benefits of surgery again discussed with patient. Patient's questions addressed and answered. Written " informed consent obtained and reviewed. Surgical site marked with indelible marker with patient's participation after confirming site with patient.      Daniel Hammonds M.D., M.S.  Dept. of Orthopaedic Surgery  [facility]

## 2025-03-04 NOTE — ANESTHESIA PROCEDURE NOTES
PENG Procedure Note    Pre-Procedure   Staff -        CRNA: Cruz Brantley APRN CRNA       Performed By: CRNA       Procedure Start/Stop Times: 3/4/2025 10:03 AM and 3/4/2025 10:22 AM       Pre-Anesthestic Checklist: patient identified, IV checked, site marked, risks and benefits discussed, informed consent, monitors and equipment checked, pre-op evaluation, at physician/surgeon's request and post-op pain management  Timeout:       Correct Patient: Yes        Correct Procedure: Yes        Correct Site: Yes        Correct Position: Yes        Correct Laterality: Yes        Site Marked: Yes  Procedure Documentation  Procedure: PENG         Laterality: right       Patient Position: supine       Patient Prep/Sterile Barriers: sterile gloves, mask, patient draped       Skin prep: Chloraprep       Local skin infiltrated with mL of 1% lidocaine.        Needle Type: insulated       Needle Gauge: 21.        Needle Length (millimeters): 100                 Ultrasound guided       1. Ultrasound was used to identify targeted nerve, plexus, vascular marker, or fascial plane and place a needle adjacent to it in real-time.       2. Ultrasound was used to visualize the spread of anesthetic in close proximity to the above referenced structure.       3. A permanent image is entered into the patient's record.       4. The visualized anatomic structures appeared normal.       5. There were no apparent abnormal pathologic findings.    Assessment/Narrative         The placement was positive for painful injection.       The placement was negative for: blood aspirated and site bleeding       Paresthesias: No.       Bolus given via needle. no blood aspirated via catheter.        Secured via.        Insertion/Infusion Method: Single Shot       Complications: none       Injection made incrementally with aspirations every 5 mL.    Medication(s) Administered   Medication Administration Time: 3/4/2025 10:03 AM      FOR [facility] ([facility])  "ONLY:   Pain Team Contact information: please page the Pain Team Via [facility]. Search \"Pain\". During daytime hours, please page the attending first. At night please page the resident first.      "

## 2025-03-04 NOTE — ANESTHESIA CARE TRANSFER NOTE
Patient: Pauletet Dorsey    Procedure: Procedure(s):  ARTHROPLASTY, right HIP, TOTAL, DIRECT ANTERIOR APPROACH       Diagnosis: Primary osteoarthritis of hips, bilateral [M16.0]  Diagnosis Additional Information: No value filed.    Anesthesia Type:   Spinal     Note:    Oropharynx: oropharynx clear of all foreign objects and spontaneously breathing  Level of Consciousness: awake and drowsy  Oxygen Supplementation: room air    Independent Airway: airway patency satisfactory and stable  Dentition: dentition unchanged  Vital Signs Stable: post-procedure vital signs reviewed and stable  Report to RN Given: handoff report given  Patient transferred to: Phase II    Handoff Report: Identifed the Patient, Identified the Reponsible Provider, Reviewed the pertinent medical history, Discussed the surgical course, Reviewed Intra-OP anesthesia mangement and issues during anesthesia, Set expectations for post-procedure period and Allowed opportunity for questions and acknowledgement of understanding      Vitals:  Vitals Value Taken Time   BP     Temp     Pulse     Resp     SpO2         Electronically Signed By: CHADWICK Sanchez CRNA  March 4, 2025  1:57 PM

## 2025-03-04 NOTE — PROGRESS NOTES
CHIEF COMPLAINT:   Chief Complaint   Patient presents with    Right Hip - Surgical Followup     Sabrina Rivera is here today 2 weeks s/p BRENDEN on 3.4.25.  His pain is doing well, gets occasional soreness here and there but uses tylenol, ice, ibuprofen to control well.  Incision site looks good, no redness or swelling.           SURGERY: Total hip arthroplasty, right hip, anterior approach ([facility])  DATE OF SURGERY: 3/4/2025       HISTORY OF PRESENT ILLNESS: Paulette Dorsey is a 69 year old male seen for postoperative evaluation of right total hip arthroplasty. It has been 2 weeks since surgery. Returns today stating doing well. Soreness comes and goes. Denies any wound problems. Denies numbness, tingling, or weakness in the affected extremity. Denies fevers chills night sweats. Continues to take aspirin for anti-coagulation for deep vein thrombosis prophylaxis. Use of pain medications has been tylenol, ibuprofen, ice.  Concerns today include: none. He is already pleased.    Present symptoms: mild pain, mild swelling.    Pain severity: 2/10      PAST MEDICAL HISTORY:   Past Medical History:   Diagnosis Date    Arthritis     Colon cancer (H) 8/05    Dr. King Noe, oncology [facility].  Had surgery and chemo    Combined forms of age-related cataract, mild, of both eyes 8/14/2021    GERD (gastroesophageal reflux disease)     High cholesterol     Peripheral arterial disease     popliteal aneurysm [place]     Patient Active Problem List   Diagnosis    CARDIOVASCULAR SCREENING; LDL GOAL LESS THAN 100    Choroidal nevus of left eye    SNHL (sensory-neural hearing loss), asymmetrical    Bilateral popliteal artery aneurysm    Combined forms of age-related cataract, mild, of both eyes    Pure hypercholesterolemia    Osteoarthrosis    Malignant neoplasm of colon, unspecified part of colon (H)       PAST SURGICAL HISTORY:   Past Surgical History:   Procedure Laterality Date    ABDOMEN SURGERY  aug 2025    Colon  Cancer    COLECTOMY  08/2005    partial colectomy    COLONOSCOPY  Mar 2024    HERNIA REPAIR, INGUINAL RT/LT      Hernia Repair, Femoral RT    SURGICAL HISTORY OF -       left popliteal bypass secondary to aneurysm    VASCULAR SURGERY  June 2006    Aneurysm popliteal Left       Medications: No current facility-administered medications for this visit.    Current Outpatient Medications:     acetaminophen (TYLENOL) 325 MG tablet, Take 2 tablets (650 mg) by mouth every 4 hours as needed for other (mild pain)., Disp: 100 tablet, Rfl: 0    aspirin (ASA) 325 MG EC tablet, Take 1 tablet (325 mg) by mouth daily., Disp: 30 tablet, Rfl: 0    HYDROmorphone (DILAUDID) 2 MG tablet, Take 1-2 tablets (2-4 mg) by mouth every 6 hours as needed for moderate to severe pain., Disp: 15 tablet, Rfl: 0    hydrOXYzine HCl (ATARAX) 10 MG tablet, Take 1 tablet (10 mg) by mouth every 6 hours as needed for itching or anxiety (with pain, moderate pain)., Disp: 30 tablet, Rfl: 0    senna-docusate (SENOKOT-S/PERICOLACE) 8.6-50 MG tablet, Take 1-2 tablets by mouth 2 times daily. Take while on oral narcotics to prevent or treat constipation., Disp: 30 tablet, Rfl: 0    Facility-Administered Medications Ordered in Other Visits:     ceFAZolin Sodium (ANCEF) injection 2 g, 2 g, Intravenous, Pre-Op/Pre-procedure x 1 dose, Daniel Hammonds MD    ceFAZolin Sodium (ANCEF) injection 2 g, 2 g, Intravenous, See Admin Instructions, Daniel Hammonds MD    lactated ringers infusion, , Intravenous, Continuous, Crystal Morrissey APRN CRNA, Last Rate: 100 mL/hr at 03/04/25 0935, New Bag at 03/04/25 0935    lidocaine (LMX4) kit, , Topical, Q1H PRN, Crystal Morrissey APRN CRNA    lidocaine 1 % 0.1-1 mL, 0.1-1 mL, Other, Q1H PRN, Crystal Morrissey APRN CRNA, 0.1 mL at 03/04/25 0935    sodium chloride (PF) 0.9% PF flush 3 mL, 3 mL, Intracatheter, Q8H, Crystal Morrissey, APRN CRNA    sodium chloride (PF) 0.9% PF flush 3 mL, 3 mL, Intracatheter, q1 min prn, Crystal Morrissey,  "CHADWICK CRNA    Allergies:   Allergies   Allergen Reactions    Buspirone Other (See Comments)     Feels like he is going to jump out of his skin    Hydrocodone Nausea and Vomiting     Sweating    Oxycodone Other (See Comments)     Tylenol/Acetaminophen is OK.  Sweating, pins and needles.  Nightmares    Oxycodone-Acetaminophen Nausea and Vomiting     Finding of vomiting         REVIEW OF SYSTEMS:  CONSTITUTIONAL:NEGATIVE for fever, chills, night sweats, change in weight  INTEGUMENTARY/SKIN: NEGATIVE for worrisome rashes, moles or lesions  MUSCULOSKELETAL:See HPI above  NEURO: NEGATIVE for weakness, dizziness or paresthesias    PHYSICAL EXAM:  Ht 1.93 m (6' 4\")   Wt 105.2 kg (232 lb)   BMI 28.24 kg/m     GENERAL APPEARANCE: healthy, alert, no distress  SKIN: no suspicious lesions or rashes  NEURO: Normal strength and tone, mentation intact and speech normal  PSYCH:  mentation appears normal and affect normal/bright  RESPIRATORY: No increased work of breathing.    BILATERAL LOWER EXTREMITIES:  Gait: favors the right, using crutches.    Bilateral calf soft and nttp or squeeze.    right lower extremity:  Intact sensation deep peroneal nerve, superficial peroneal nerve, med/lat tibial nerve, sural nerve, saphenous nerve  Intact EHL, EDL, TA, FHL, GS, quadriceps hamstrings and hip flexors   Edema: trace    right HIP EXAM:    Incision: healing well. Dry. No open wounds. No drainage. No erythema. Resolving ecchymosis.  Mild-moderate swelling..  Palpation: Tender:   incisional, tensor.  Strength:  4/5  Special tests:  Irritability (flexion/adduction/internal rotation) negative.  Hip range of motion: normal, all pain free       X-RAY:  AP pelvis, lateral views right hip from 3/20/2025 were reviewed in clinic today. No obvious fractures or dislocations. right hip arthroplasty components in place without obvious failure, complication, fracture, or dislocation. Severe left hip osteoarthritis.      Impression: 69 year old male seen " for postoperative evaluation of right total hip arthroplasty. It has been 2 weeks since surgery. Doing well.      Plan:   * reviewed xrays with patient today showing total hip arthroplasty implants.  * continue DVT prophylaxis for a total of 4 weeks postoperative  * wean off all pain medications as tolerated  * xrays next visit: lowset AP pelvis and lateral hip  * return to clinic 4 weeks, sooner if needed.  * all questions addressed and answered prior to discharge from clinic today to patient's satisfaction.  * patient will need longterm (at least 1 years depending on risk factors) prophylactic antibiotics use with dental procedures or other invasive procedures (2 g amoxicilin or  2g Keflex or 500mg azithromycin or clarithromycin or 100mg doxycycline) 30-60 minutes prior to dental procedures.        Daniel Hammonds M.D., M.S.  Dept. of Orthopaedic Surgery  [facility]

## 2025-03-04 NOTE — OR NURSING
Pt here with wife and daughter. Voids before iv. Preop meds given. Will hook up for preop block after iv. Pain 3/10. Right hip , left shoulder and neck pain preop. Has bilat numbness feet preop.

## 2025-03-04 NOTE — OP NOTE
DATE OF SERVICE:  3/4/2025      PREOPERATIVE DIAGNOSIS:  Primary Osteoarthritis  - right Hip    POSTOPERATIVE DIAGNOSIS:  Primary Osteoarthritis  - right Hip    OPERATION PERFORMED:  Total Hip Arthroplasty, Press Fit, right Hip, Direct anterior Approach.    ATTENDING SURGEON(S):  Daniel Hammonds M.D., M.S.    ASSISTANT(S): ROSE ghotra    ANESTHESIA: Spinal with MAC; in the supine position on the [place] surgical table.    ESTIMATED BLOOD LOSS:   400mL.    FLUIDS:  1000 mL LR    UO: no العراقي.    ANTIBIOTICS:  cefazolin, 2gm IVPB prior to incision and repeat 2gm iv at closure.    Tranexamic Acid: 1950mg oral prior to procedure    IMPLANTS / GRAFTS:    DePuy 56mm [place] Porocoat acetabular shell with a +4 neutral [place] Altrx polyethylene liner  Size 9 DePuy Actis femoral stem, HI offset  +5, 36 mm Articul/cedrick ceramic femoral head.       DRAINS:  None.    COMPLICATIONS:  None.     SPECIMENS: none      FINDINGS: advanced hip osteoarthritis, eburnated femoral head with marginal osteophytes. Acetabulum with large, impinging marginal osteophytes. Effusion.    INDICATIONS FOR PROCEDURE: Paulette Dorsey is a pleasant 69 year old male who has had ongoing significant pain in the right hip. Xrays with advanced hip degenerative arthrosis with loss of joint space and marginal osteophytes. The risk/benefit analysis of the above right total hip arthroplasty was explained. Alternate bearing technology and the additional risks of a press fit hip were explained, including fracture. Risks discussed to include, but not be limited to: wear, loosening, infection, bleeding, pain, scar, thromboembolic disease, neurovascular damage with temporary or permanent nerve damage, limb length inequality, implant failure, implant dislocation, edson-prosthetic fracture, need for further surgery, risks of anesthesia and death.  Despite these risks, the patient elected to proceed and, with the patient's permission, was taken to the operating  room.       DESCRIPTION OF PROCEDURE:  The patient was identified in the preoperative holding area.  After confirming with the patient the correct procedure and procedure site, the right hip was marked with an indelible marker by myself, the attending surgeon.  After again reviewing the risks and perceived benefits of surgery, questions were addressed, he elected to proceed and written informed consent was obtained and reviewed.     The patient was then taken to the operating room and placed supine on the operating surgical [place] table.  After adequate anesthesia, a Garcia catheter was placed.  The lower extremities were placed in the boots and suspended.  The arms were well positioned and padded to be comfortable.  The right lower extremity was then prepped and draped in the usual sterile fashion.     A timeout was then performed confirming the correct patient, procedure, procedure site, availability of instruments and implants, the administration of prophylactic antibiotics as well as a review of the patient's allergies by all surgical staff.    At that time that an anterior based incision was made approximately two fingerbreadths lateral (3cm) and two fingerbreadths distal to the ASIS in an oblique fashion extending posterior-distally towards the anterior aspect of the femur approximately 10-12 cm in length, sharply through skin.  We then used electrocautery through the subcutaneous tissues, performing hemostasis.  Perforating vessels were cauterized. The fascia over the tensor was then incised longitudinally.  I then proceeded to dissect the tensor muscle off the  undersurface of the fascia anteriorly and medially, exposing the underlying tissues and capsule as well as the rectus femoris medially.  A retractor was placed laterally over the neck as well as inferiorly over the femoral neck and anteriorly over the acetabulum rim deep to the rectus.  We then proceeded to use a James retractor laterally and a vascular  "bundle of the circumflex vessels was identified.  Using the Aquamantys, these vessels were then cauterized.  Good exposure of the anterior aspect  of the femoral neck capsule was obtained.  I then proceeded to make a capsulotomy an inverted \"T\" fashion, starting at the superolateral aspect of the acetabulum and femoral head, along the superior aspect of the femoral neck down to the intertrochanteric ridge and then distally in an oblique fashion along the intertrochanteric ridge towards the lesser trochanter.  This anterior capsule was then tagged. I also tagged the superolateral capsule at the saddle of the trochanter and gently released this as well.  This exposed the underlying femoral head and neck. There were prominent marginal osteophytes. The retractors were then placed intracapsular.  Further  capsular releases were performed inferiorly along the medial calcar to get to the base of the lesser trochanter as well as laterally into the trochanteric saddle.     I then used a C-arm to get positioning with making the femoral neck cut.  After determining adequate cut to the templated length, a first cut was made at the intended final cut.  Care was taken to avoid the posterior hanging greater trochanter.  I then proceeded to make another cut medially at the base of the femoral head, to make a \"napkin\" ring, to aid in removal of the femoral head.  Using a corkscrew into the femoral head, using a twisting motion,  the femoral head was then removed in line with the fibers of the tensor muscle with care taken to protect the tensor.  The wound was then irrigated.  I then proceeded to place a retractor anteriorly over the anterior/inferior acetabulum, inferiorly over the transverse acetabular ligament as well as posteriorly within the capsule, giving good exposure to the acetabulum.  I then proceeded using either a long-handled knife or electrocautery to remove labral tissue as well as the  pulvinar at the acetabular " floor. Overhanging osteophytes were removed.  There was significant medial acetabular osteophyte.  Once the soft tissue was cleared out, I then proceeded to ream.  Using a 51 mm reamer and using a C-arm, the acetabulum was medialized to the teardrop, removing the medial osteophyte.  I then sequentially increased reamer sizes by 2 mm, up to a 55, maintaining correct abduction and version, which was confirmed with the C-arm.  We trialed a size 55 cup.  This  was a very good and tight fit.  I then proceeded to place the final acetabular shell using the C-arm to guide the abduction angle as well as anteversion, which was approximately 20 degrees.  Once this was confirmed, the final acetabular polyethylene liner was placed.  The wound was irrigated.     I then proceeded to the femur.  A hook retractor was placed along the lateral aspect of the trochanter just distal to the vastus tubercle. Using my hand, I pulled the femur laterally taking care that it was not caught under the acetabulum. We then proceeded to lower the limb down to the floor and slightly adduct, externally rotating the foot to 120 degrees (femur about 90 degrees), giving good femoral exposure.  A retractor was placed medially along the calcar.  I then proceeded to release the lateral capsule along the inner aspect of the greater trochanter, taking care of  the underlying rotators; these tendons were exposed and kept intact.  This gave better exposure of the femur. A shepards hook was placed over the tip of the greater trochanter.  I then proceeded to connect the femoral hook to the lift and using my hand to lift the femur, proceeded to elevate the lift until there was firm pressure against my hand.  This gave us adequate exposure of the femur for broaching.  A cookie cutter was placed laterally to remove some of the remnant of the lateral neck as well as a  rongeur.  A canal finding awl was placed down the femur.  I then proceeded to broach, first  starting with the chili pepper broach followed by sequential broaching up to the templated size.  This provided a good fit.  This was calcar planed.  The wound was irrigated.  I then proceeded to do a trial reduction,  Starting off with a +1.5 femoral head, releasing the femoral hook and removing the retractors and bringing the leg back up to a neutral position,  the hip was easily reduced.  Using C-arm, we assess length using the overlay technique with printed images.  Once the final length was determined trialing varius head sizes using the overlay technique, we proceed to dislocate the trial and proceed with final femoral stem placement. The final femoral stem was then placed.  This provided good fixation on the femoral component.  I then proceeded to trial once again the previous trialed femoral head and this was reduced, with final images showing good length. It appeared to be a good fit and stable.  This trial was then dislocated and the  final ceramic femoral head was placed.  The wound was copiously irrigated.  The hip was then reduced.  It was taken through a range of motion including flexion, extension, internal and external rotation and there was good stability without dislocation concerns.  The wound was then copiously irrigated starting with a liter of dilute Betadine followed by three liters of antibiotic normal saline.  A second dose of Ancef was administered.  The  capsule was repaired side-to-side with 0 Ethibond.  One gram of vancomycin powder was placed deep in the wound.  The fascia over the tensor was closed side-to-side with #1 Vicryl followed by a 0 Stratafix.  The subcutaneous and dermal tissues were then approximated with 0 Vicryl and 3-0 Monocryl.  3-0 Monocryl subcuticular suture was used to maintain good skin eversion and apposition with Dermabond to seal the skin edges.  A sterile waterproof  dressing was applied over the incision.  The patient was then awakened and extubated and taken  to the recovery room without difficulty in stable condition.  There were no apparent complications.       A postoperative pelvis x-ray will be obtained in recovery.      The postoperative plan will be to weight bear as tolerated.  No hip precautions.  Twenty-three hours of IV antibiotics.  Anticoagulation will proceed for four weeks postoperative with aspirin..     There were no apparent complications.        Daniel Hammonds M.D., M.S.  Dept. of Orthopaedic Surgery  [facility]

## 2025-03-05 ENCOUNTER — PATIENT OUTREACH (OUTPATIENT)
Dept: GERIATRIC MEDICINE | Facility: CLINIC | Age: 70
End: 2025-03-05
Payer: COMMERCIAL

## 2025-03-05 ENCOUNTER — APPOINTMENT (OUTPATIENT)
Dept: PHYSICAL THERAPY | Facility: CLINIC | Age: 70
End: 2025-03-05
Attending: ORTHOPAEDIC SURGERY
Payer: COMMERCIAL

## 2025-03-05 VITALS
OXYGEN SATURATION: 100 % | BODY MASS INDEX: 28.01 KG/M2 | RESPIRATION RATE: 18 BRPM | WEIGHT: 230 LBS | DIASTOLIC BLOOD PRESSURE: 61 MMHG | TEMPERATURE: 97.9 F | SYSTOLIC BLOOD PRESSURE: 121 MMHG | HEIGHT: 76 IN | HEART RATE: 90 BPM

## 2025-03-05 PROBLEM — C18.9 MALIGNANT NEOPLASM OF COLON, UNSPECIFIED PART OF COLON (H): Status: ACTIVE | Noted: 2024-03-21

## 2025-03-05 PROBLEM — G47.33 OSA (OBSTRUCTIVE SLEEP APNEA): Status: ACTIVE | Noted: 2025-03-05

## 2025-03-05 LAB
ANION GAP SERPL CALCULATED.3IONS-SCNC: 10 MMOL/L (ref 7–15)
ANION GAP SERPL CALCULATED.3IONS-SCNC: 12 MMOL/L (ref 7–15)
BUN SERPL-MCNC: 16.8 MG/DL (ref 8–23)
BUN SERPL-MCNC: 16.8 MG/DL (ref 8–23)
CALCIUM SERPL-MCNC: 8.1 MG/DL (ref 8.8–10.4)
CALCIUM SERPL-MCNC: 8.1 MG/DL (ref 8.8–10.4)
CHLORIDE SERPL-SCNC: 105 MMOL/L (ref 98–107)
CHLORIDE SERPL-SCNC: 106 MMOL/L (ref 98–107)
CREAT SERPL-MCNC: 0.87 MG/DL (ref 0.67–1.17)
CREAT SERPL-MCNC: 0.9 MG/DL (ref 0.67–1.17)
EGFRCR SERPLBLD CKD-EPI 2021: >90 ML/MIN/1.73M2
EGFRCR SERPLBLD CKD-EPI 2021: >90 ML/MIN/1.73M2
ERYTHROCYTE [DISTWIDTH] IN BLOOD BY AUTOMATED COUNT: 12 % (ref 10–15)
ERYTHROCYTE [DISTWIDTH] IN BLOOD BY AUTOMATED COUNT: 12.1 % (ref 10–15)
FASTING STATUS PATIENT QL REPORTED: ABNORMAL
FASTING STATUS PATIENT QL REPORTED: ABNORMAL
GLUCOSE SERPL-MCNC: 121 MG/DL (ref 70–99)
GLUCOSE SERPL-MCNC: 137 MG/DL (ref 70–99)
GLUCOSE SERPL-MCNC: 137 MG/DL (ref 70–99)
HCO3 SERPL-SCNC: 19 MMOL/L (ref 22–29)
HCO3 SERPL-SCNC: 22 MMOL/L (ref 22–29)
HCT VFR BLD AUTO: 26.9 % (ref 40–53)
HCT VFR BLD AUTO: 27.8 % (ref 40–53)
HGB BLD-MCNC: 9.3 G/DL (ref 13.3–17.7)
HGB BLD-MCNC: 9.5 G/DL (ref 13.3–17.7)
HGB BLD-MCNC: 9.9 G/DL (ref 13.3–17.7)
MCH RBC QN AUTO: 31.7 PG (ref 26.5–33)
MCH RBC QN AUTO: 32.3 PG (ref 26.5–33)
MCHC RBC AUTO-ENTMCNC: 34.2 G/DL (ref 31.5–36.5)
MCHC RBC AUTO-ENTMCNC: 34.6 G/DL (ref 31.5–36.5)
MCV RBC AUTO: 93 FL (ref 78–100)
MCV RBC AUTO: 93 FL (ref 78–100)
PLATELET # BLD AUTO: 173 10E3/UL (ref 150–450)
PLATELET # BLD AUTO: 174 10E3/UL (ref 150–450)
POTASSIUM SERPL-SCNC: 4 MMOL/L (ref 3.4–5.3)
POTASSIUM SERPL-SCNC: 4.1 MMOL/L (ref 3.4–5.3)
RBC # BLD AUTO: 2.88 10E6/UL (ref 4.4–5.9)
RBC # BLD AUTO: 3 10E6/UL (ref 4.4–5.9)
SODIUM SERPL-SCNC: 137 MMOL/L (ref 135–145)
SODIUM SERPL-SCNC: 137 MMOL/L (ref 135–145)
WBC # BLD AUTO: 16.1 10E3/UL (ref 4–11)
WBC # BLD AUTO: 18.6 10E3/UL (ref 4–11)

## 2025-03-05 PROCEDURE — 82565 ASSAY OF CREATININE: CPT | Performed by: INTERNAL MEDICINE

## 2025-03-05 PROCEDURE — 80048 BASIC METABOLIC PNL TOTAL CA: CPT | Performed by: INTERNAL MEDICINE

## 2025-03-05 PROCEDURE — 258N000003 HC RX IP 258 OP 636: Performed by: ORTHOPAEDIC SURGERY

## 2025-03-05 PROCEDURE — 85018 HEMOGLOBIN: CPT

## 2025-03-05 PROCEDURE — 97110 THERAPEUTIC EXERCISES: CPT | Mod: GP | Performed by: PHYSICAL THERAPIST

## 2025-03-05 PROCEDURE — 36415 COLL VENOUS BLD VENIPUNCTURE: CPT

## 2025-03-05 PROCEDURE — 999N000157 HC STATISTIC RCP TIME EA 10 MIN

## 2025-03-05 PROCEDURE — 250N000013 HC RX MED GY IP 250 OP 250 PS 637: Performed by: ORTHOPAEDIC SURGERY

## 2025-03-05 PROCEDURE — 258N000003 HC RX IP 258 OP 636: Performed by: INTERNAL MEDICINE

## 2025-03-05 PROCEDURE — 36415 COLL VENOUS BLD VENIPUNCTURE: CPT | Performed by: INTERNAL MEDICINE

## 2025-03-05 PROCEDURE — 97116 GAIT TRAINING THERAPY: CPT | Mod: GP | Performed by: PHYSICAL THERAPIST

## 2025-03-05 PROCEDURE — 97161 PT EVAL LOW COMPLEX 20 MIN: CPT | Mod: GP | Performed by: PHYSICAL THERAPIST

## 2025-03-05 PROCEDURE — 82947 ASSAY GLUCOSE BLOOD QUANT: CPT | Performed by: ORTHOPAEDIC SURGERY

## 2025-03-05 PROCEDURE — 85027 COMPLETE CBC AUTOMATED: CPT | Performed by: INTERNAL MEDICINE

## 2025-03-05 PROCEDURE — 250N000011 HC RX IP 250 OP 636: Mod: JZ | Performed by: ORTHOPAEDIC SURGERY

## 2025-03-05 RX ADMIN — SODIUM CHLORIDE 500 ML: 0.9 INJECTION, SOLUTION INTRAVENOUS at 01:17

## 2025-03-05 RX ADMIN — CEFAZOLIN 2 G: 2 INJECTION, POWDER, LYOPHILIZED, FOR SOLUTION INTRAVENOUS at 05:38

## 2025-03-05 RX ADMIN — SODIUM CHLORIDE, SODIUM LACTATE, POTASSIUM CHLORIDE, AND CALCIUM CHLORIDE: .6; .31; .03; .02 INJECTION, SOLUTION INTRAVENOUS at 01:15

## 2025-03-05 RX ADMIN — ACETAMINOPHEN 975 MG: 325 TABLET, FILM COATED ORAL at 09:46

## 2025-03-05 RX ADMIN — ASPIRIN 325 MG: 325 TABLET, COATED ORAL at 00:10

## 2025-03-05 RX ADMIN — ACETAMINOPHEN 975 MG: 325 TABLET, FILM COATED ORAL at 00:09

## 2025-03-05 ASSESSMENT — ACTIVITIES OF DAILY LIVING (ADL)
ADLS_ACUITY_SCORE: 27
ADLS_ACUITY_SCORE: 28
ADLS_ACUITY_SCORE: 25
ADLS_ACUITY_SCORE: 24
ADLS_ACUITY_SCORE: 25
ADLS_ACUITY_SCORE: 28
ADLS_ACUITY_SCORE: 25

## 2025-03-05 NOTE — PLAN OF CARE
[facility] DISCHARGE NOTE    Patient discharged to home at 3:41 PM via wheel chair. Accompanied by spouse and staff. Discharge instructions reviewed with patient and spouse, opportunity offered to ask questions. Prescriptions sent to patients preferred pharmacy. All belongings sent with patient.    Nery Nice RN

## 2025-03-05 NOTE — PROGRESS NOTES
Assisted patient with home CPAP set up. 2 liters O2 added to circuit.  Patient will be independent post initial set up.

## 2025-03-05 NOTE — PLAN OF CARE
Goal Outcome Evaluation:  Problem: Adult Inpatient Plan of Care  Goal: Optimal Comfort and Wellbeing  3/5/2025 0100 by Caroline Alexander, RN  Outcome: Progressing  3/4/2025 2232 by Caroline Alexander, RN  Outcome: Progressing  Updated Dr. Pope via vocera web console: Pt is in with RTHA post op day 1 now. He sat up on the side of bed and dangled. BP 77/53, TX 97. He was assisted to lying position. /55, TX 79. Bladder scan: 523. He voided 325 ml clear pierre urine in urinal. He appears pale. He has IVF infusing: LR at 125ml/hour. Do you want bolus? Hgb?

## 2025-03-05 NOTE — PLAN OF CARE
Physical Therapy Discharge Summary    Reason for therapy discharge:    All goals and outcomes met, no further needs identified.    Progress towards therapy goal(s). See goals on Care Plan in [facility] electronic health record for goal details.  Goals met    Therapy recommendation(s):    Continued therapy is recommended.  Rationale/Recommendations:  Recommend continued OP PT to progress R BRENDEN aftercares. Pt ambulated and completing stairs this PM without complaints of lightheadedness, safe to return home from a PT perspective once medically stable.

## 2025-03-05 NOTE — PROGRESS NOTES
"[facility] DAILY PROGRESS NOTE      History: Paulette Dorsey is a 69 year old male POD # 1 s/p right total hip arthroplasty on 3/4/2025 with Dr Hammonds    Interval events: low BPs but states didn't really have any symptoms.      SUBJECTIVE:  Patient states pain is well controlled with prescribed medications. Tolerating diet. Voiding spontaneously. Denies numbness or tingling in affected extremity. Denies nausea or vomiting. Denies chest pain, shortness of breath.        OBJECTIVE:  /58 (BP Location: Left arm)   Pulse 90   Temp 98.1  F (36.7  C) (Oral)   Resp 16   Ht 1.93 m (6' 4\")   Wt 104.3 kg (230 lb)   SpO2 98%   BMI 28.00 kg/m      Temp (24hrs), Av.6  F (36.4  C), Min:96.8  F (36  C), Max:98.2  F (36.8  C)      General:   Patient awake alert, oriented. NAD.  Breathing comfortably on room air.  Reclined in bed.    right Lower Extremity:    Incision is covered with aquacel    Mild incisional swelling. Ecchymosis.  Intact sensation deep peroneal nerve, superficial peroneal nerve, medial and lateral plantar nerve, sural nerve, saphenous nerve.  Intact ehl, edl, ta, fhl, gs, quads, hamstrings, hip flexors  Toes warm and well perfused w/ brisk capillary refill, palpable dorsalis pedis pulse  Calves soft and nontender to squeeze.    Hemoglobin   Date Value Ref Range Status   2025 9.3 (L) 13.3 - 17.7 g/dL Final   2025 9.5 (L) 13.3 - 17.7 g/dL Final            PT:  pending today.           ASSESSMENT: Paulette Dorsey is a 69 year old male POD # 1 s/p right total hip arthroplasty on 3/4/2025, doing well.    PLAN:  * low BP likely due to acute blood loss anemia during surgery. Per patient, hasn't really had symptoms.  * Weightbearing: weight bear as tolerated.  * No hip precautions.  * Diet  * IVF, saline lock when tolerating good PO  * IV antibiotics x23 hours  * Consults: Hospitalists, appreciate medical comanagement.    * Pain control: PO pain meds w/ iv for break through pain  * DVT " POST OPERATIVE PROCEDURAL DOCUMENTATION  PRE-OP DIAGNOSIS: Atrial flutter    POST-OP DIAGNOSIS: Atrial flutter with successful cardioversion to normal sinus rhythm    PROCEDURE: Transesophageal echocardiogram and Cardioversion    Primary Physician: Dr. Montes De Oca  Assistant: Jamie    ANESTHESIA TYPE  [  ] General Anesthesia  [ x ] Conscious Sedation  [  ] Local/Regional    CONDITION  [  ] Critical  [  ] Serious  [x] Fair  [  ] Good    SPECIMENS REMOVED (IF APPLICABLE): N/A    IMPLANTS (IF APPLICABLE): None    ESTIMATED BLOOD LOSS: None    COMPLICATIONS: None      FINDINGS:    After risks and benefits of procedures were explained, informed consent was obtained and placed in chart. Refer to Anesthesia note for sedation details.  The HSAJI probe was passed into the esophagus without difficulty.  Transesophageal and transgastric images were obtained.  The SHAJI probe was removed without difficulty and examined.  There was no evidence for bleeding.  The patient tolerated the procedure well without any immediate SHAJI-related complications.      Preliminary Findings:  LA and RA: Dilated.  XOCHILT: Left atrial appendage was clear of clot and spontaneous echo contrast.  LV: LVEF was estimated at 60-65%  MV: Mild-Mod MR, No evidence for MS.   AV: Mild AI. No evidence for AS.   TV: Moderate TR.   IAS: no PFO. NO R-> L shunt on color doppler.  There was mild, non-mobile atheroma seen in the thoracic aorta.     Patient successfully converted to sinus rhythm with synchronized 200 J of direct current cardioversion via AP pads.    DIAGNOSIS/IMPRESSION:  Atrial flutter with successful cardioversion to normal sinus rhythm.     PLAN OF CARE:  [x] Continue anticoagulation.   [x] Continue with rate controlled medication.  Results of procedure/ plan of care discussed with patient/  in detail. prophy: Seqential compression devices in hospital, 4 weeks of 325mg aspirin daily  * Activity: with assist and assistive device.  * PT: twice daily  * Remove waterproof Aquacel AG dressing 10-12 days, cover with dry guaze as needed.    * Anticipate d/c home when BP stable.  * postop followup with Dr Hammonds upon discharge.      Daniel Hammonds M.D., M.S.  Dept. of Orthopaedic Surgery  [facility]     POST OPERATIVE PROCEDURAL DOCUMENTATION  PRE-OP DIAGNOSIS: Atrial flutter    POST-OP DIAGNOSIS: Atrial flutter with successful cardioversion to normal sinus rhythm    PROCEDURE: Transesophageal echocardiogram and Cardioversion    Primary Physician: Dr. Montes De Oca  Assistant: Jamie    ANESTHESIA TYPE  [  ] General Anesthesia  [ x ] Conscious Sedation  [  ] Local/Regional    CONDITION  [  ] Critical  [  ] Serious  [x] Fair  [  ] Good    SPECIMENS REMOVED (IF APPLICABLE): N/A    IMPLANTS (IF APPLICABLE): None    ESTIMATED BLOOD LOSS: None    COMPLICATIONS: None      FINDINGS:    After risks and benefits of procedures were explained, informed consent was obtained and placed in chart. Refer to Anesthesia note for sedation details.  The SHAJI probe was passed into the esophagus without difficulty.  Transesophageal and transgastric images were obtained.  The SHAJI probe was removed without difficulty and examined.  There was no evidence for bleeding.  The patient tolerated the procedure well without any immediate SHAJI-related complications.      Preliminary Findings:  LA and RA: Dilated. Prominent Eustachean valve seen in RA.  XOCHILT: Left atrial appendage was clear of clot and spontaneous echo contrast.  LV: LVEF was estimated at 60-65%  MV: Mild-Mod MR, No evidence for MS.   AV: Mild AI. No evidence for AS. Trileaflet, sclerotic aortic valve. Fairly mobile calcified leaflet tip of left coronary cusp. Right coronary cusp with restricted mobility.  TV: Moderate TR.   PV: Trace PI.  IAS: no PFO. NO R-> L shunt on color doppler.  There was mild, non-mobile atheroma seen in the thoracic aorta.     Patient successfully converted to sinus rhythm with synchronized 200 J of direct current cardioversion via AP pads.    DIAGNOSIS/IMPRESSION:  Atrial flutter with successful cardioversion to normal sinus rhythm.     PLAN OF CARE:  [x] Continue anticoagulation.   [x] Continue with rate controlled medication.  Results of procedure/ plan of care discussed with patient/  in detail. POST OPERATIVE PROCEDURAL DOCUMENTATION  PRE-OP DIAGNOSIS: Atrial flutter    POST-OP DIAGNOSIS: Atrial flutter with successful cardioversion to normal sinus rhythm    PROCEDURE: Transesophageal echocardiogram and Cardioversion    Primary Physician: Dr. Montes De Oca  Assistant: Jamie    ANESTHESIA TYPE  [  ] General Anesthesia  [ x ] Conscious Sedation  [  ] Local/Regional    CONDITION  [  ] Critical  [  ] Serious  [x] Fair  [  ] Good    SPECIMENS REMOVED (IF APPLICABLE): N/A    IMPLANTS (IF APPLICABLE): None    ESTIMATED BLOOD LOSS: None    COMPLICATIONS: None      FINDINGS:    After risks and benefits of procedures were explained, informed consent was obtained and placed in chart. Refer to Anesthesia note for sedation details.  The SHAJI probe was passed into the esophagus without difficulty.  Transesophageal and transgastric images were obtained.  The SHAJI probe was removed without difficulty and examined.  There was no evidence for bleeding.  The patient tolerated the procedure well without any immediate SHAJI-related complications.      Preliminary Findings:  LA and RA: Dilated. Prominent Eustachean valve seen in RA.  XOCHILT: Left atrial appendage was clear of clot and spontaneous echo contrast.  LV: LVEF was estimated at 60-65%  MV: Moderate MR, No evidence for MS.   AV: Mild AI. No evidence for AS. Trileaflet, sclerotic aortic valve. Fairly mobile calcified leaflet tip of left coronary cusp. Right coronary cusp with restricted mobility.  TV: Moderate TR.   PV: Trace PI.  IAS: no PFO. NO R-> L shunt on color doppler.  There was mild, non-mobile atheroma seen in the thoracic aorta.     Patient successfully converted to sinus rhythm with synchronized 200 J of direct current cardioversion via AP pads.    DIAGNOSIS/IMPRESSION:  Atrial flutter with successful cardioversion to normal sinus rhythm.     PLAN OF CARE:  [x] Continue anticoagulation.   [x] Continue with rate controlled medication.  Results of procedure/ plan of care discussed with patient/  in detail.

## 2025-03-05 NOTE — PLAN OF CARE
Occupational Therapy: Orders received and acknowledged. Chart reviewed and discussed with rehab team.? Occupational Therapy not indicated due to no identified OT needs at this time.? Defer discharge recommendations to Physical Therapy.? Will complete orders.

## 2025-03-05 NOTE — PROGRESS NOTES
TRANSITIONS OF CARE (APURVA) LOG    APURVA tasks should be completed by the CC within one (1) business day of notification of each transition. Follow up contact with member is required after return to their usual care setting.  Note:  If CC finds out about the transitions fifteen (15) days or more after the member has returned to their usual care setting, no APURVA log is needed. However, the CC should check in with the member to discuss the transition process, any changes needed to the care plan and document it in a case note.     Member Name:  [facility]aulette Dorsey [facility]  [id number]   Product: [facility] Care Coordinator Contact:  Brandy Harris RN, BSN, PHN Agency/County/Care System: [place]   Transition Communication Actions from Care Management Contact   Transition #1   Notification Date: 3/5/25 Transition Date:   3/4/25 Transition From: Home     Is this the member s usual care setting?               yes Transition To: [facility]   Transition Type:  Planned    Documentation from conversation with the member/responsible party, provider, discharging and receiving facility:   Date: 3/5/25: Received notification of admission to hospital with dx of right hip OA/elective surgery right BRENDEN  CC contacted Hospital /discharge planner, via the [facility] Transitional Care Hand-In Process, with community care plan included.  CC reached out to spouse Srinivasan  regarding transition and offered support as needed.  Reviewed and update support plan as needed.  Notified community service providers and placed services None on hold as needed.  Transition log initiated. Member was a refusal of home visit/assessment.  No home or community services are in place.     PCP, Zach Ochoa, aware of elective surgery on 3/4/25    Brandy Harris RN   [place] [employer]  [phone]       Transition #2   Notification Date: 3/6/25 Transition Date:   3/5/25  Transition From: [facility], [facility]     Is this the member s usual care setting?               no Transition To: Home   Transition Type:  Planned    Documentation from conversation with the member/responsible party, provider, discharging and receiving facility:   Date: 3/6/25: Received notification of transition to home.  CC contacted spouse Srinivasan  and reviewed discharge summary.  Member has a follow-up appointment with PCP in 7 days: Yes: scheduled on F/U HgB test today, PT is scheduled as well as surgeon follow up appointment.     Member has had a change in condition: Yes: Total Right Hip Arthroplasty  Home visit needed: No  Support Plan reviewed and updated.  The following home based services None were resumed.  New referrals placed: No  Transition log completed.   PCP, Zach Ochoa, notified of transition back to home via EMR.       *RETURN TO USUAL CARE SETTING: *Complete tasks below when the member is discharging TO their usual care setting within one (1) business day of notification..      For situations where the Care Coordinator is notified of the discharge prior to the date of discharge, the Care Coordinator must follow up with the member or designated representative to confirm that discharge actually occurred and discuss required APURVA tasks as outlined in the APURVA Instructions.  (This includes situations where it may be a  new  usual care setting for the member. (i.e., a community member who decides upon permanent nursing home placement following hospitalization and rehab).    Discuss with Member/Responsible Party:    Check  Yes  - if the member, family member and/or SNF/facility staff manages the following:    If  No  provide explanation in the comments section.          Date completed: 3/6/25 Communicated with member or their designated representative about the following:  care transition process; about changes to the member s health status; plan of care updates;  education about transitions and how to prevent unplanned transitions/readmissions    Four Pillars for Optimal Transition:    Check  Yes  - if the member, family member and/or SNF/facility staff manages the following:    If  No  provide explanation in the comments section.          [x]  Yes     []  No Does the member have a follow-up appointment scheduled with primary care or specialist? (Mental health hospitalizations--the appt. should be w/in 7 days)              For mental health hospitalizations:  []  Yes     [x]  No     Does the member have a follow-up appointment scheduled with a mental health practitioner within 7 days of discharge?  N/A  [x]  Yes     []  No     Has a medication review been completed with member? If no, refer to PCP, home care nurse, MTM, pharmacist  [x]  Yes     []  No     Can the member manage their medications or is there a system in place to manage medications (e.g. home care set-up)?         [x]  Yes     []  No     Can the member verbalize warning signs and symptoms to watch for and how to respond?  [x]  Yes     []  No     Does the member have a copy of and understand their discharge instructions?  If no, assist to obtain copy of discharge instructions, review discharge instructions, and assist to contact PCP to discuss questions about their recent hospitalization.  [x]  Yes     []  No     Does the member have adequate food, housing and transportation?  If no, add goal and discuss additional supports available to the member                                                                                                                                                                                 [x]  Yes     []  No     Is the member safe in their home?  If no, document needs and support provided                                                                                                                                                                          []  Yes     [x]  No      Are there any concerns of vulnerability, abuse, or neglect?  If yes, document concerns and actions taken by Care Coordinator as a mandated                                                                                                                                                                              [x]  Yes     []  No     Does the member use a Personal Health Care Record?  Check  Yes  if visit summary, discharge summary, and/or healthcare summary are being used as a PHR.                                                                                                                                                                                  [x]  Yes     []  No     Have you reviewed the discharge summary with the member? If  No  provide explanation in comments.  [x]  Yes     []  No     Have you updated the member s care plan/support plan? Add new diagnosis, medications, treatments, goals & interventions, as applicable. If No, provide explanation in comments.    Comments:      Spoke to spouse how reports that Sabrina Rivera is doing well since surgery. She reports he is getting around and that pain has been managed with tylenol.  He has an appointment today for a Hgb lab check.  She reports outpatient PT has also been set up. Denies further questions at this time.      Notes from conversation with the member/responsible party, provider, discharging and receiving facility (as applicable): N/A    Brandy Harris RN   [facility]  [phone]

## 2025-03-05 NOTE — PROGRESS NOTES
[facility] Medicine Progress Note  Date of Service: 03/05/2025    Assessment & Plan   Paulette Dorsey is a 69 year old male who presented on 3/4/2025 for scheduled Procedure(s):  ARTHROPLASTY, right HIP, TOTAL, DIRECT ANTERIOR APPROACH by Daniel Hammonds MD and is being followed by the hospital medicine service for co-management of acute and/or chronic perioperative medical problems.      S/p Procedure(s):  ARTHROPLASTY, right HIP, TOTAL, DIRECT ANTERIOR APPROACH   1 Day Post-Op    - pain control, wound cares, physical therapy, occupational therapy and DVT prophylaxis per orthopedic surgery service    Acute blood loss anemia  Hemoglobin 15.5 pre-op and 9.3 post-op due to acute blood loss anemia and dilution from IVF. Patient had SBP of 90s-100s overnight, but 120s throughout the day. In the morning he had to cancel PT because he got lightheaded with standing. He completed PT, including stairs, 3/5 pm without any dizziness/lightheadedness. He denies SOB, pre-syncope, palpitations, fatigue. Repeat hgb 3/5 pm 9.9. After discussion with patient, he wanted to go home and get an outpatient lab for recheck. Return precautions discussed.   - Hemoglobin check 3/7 outpatient    TANIA  - Continued PTA CPAP    HLD  - Continued PTA Atorvastatin 40    Anxiety  - Continued PTA Trazodone 50-100mg Qpm, Xanax 0.5mg PRN daily    DVT Prophylaxis as per orthopedic surgery service - Aspirin 325mg  Code Status: No Order    Lines: peripheral IV   Garcia catheter: none    Discussion: Medically, the patient appears to have significant acute blood loss/dilutional anemia. Patient was symptomatic overnight, but symptoms resolved and he completed PT without any symptoms. Recheck hgb stable, even improved. Patient elected to go home today with outpatient lab on Friday. Return precautions discussed.    Disposition: Anticipate discharge 3/5/25     Attestation:  I have reviewed today's vital signs, notes,  medications, labs and imaging.    I have discussed, or will be discussing, the patient with hospitalist physician, Dr. Soham Kahn, PA-C       Interval History   Patient feels well after surgery. Pain level 5/10. Is passing flatus.     Tolerating oral intake. Denies abdominal pain, n/v, chest pain, shortness of breath, cough, urinary retention, chills, numbness/tingling, dizziness.    Physical Exam   Temp:  [96.8  F (36  C)-98.2  F (36.8  C)] 98.1  F (36.7  C)  Pulse:  [53-96] 90  Resp:  [10-24] 16  BP: ()/(34-90) 104/58  SpO2:  [92 %-100 %] 98 %    Weights:   Vitals:    03/04/25 0831   Weight: 104.3 kg (230 lb)    Body mass index is 28 kg/m .    General: Awake, alert, and in no apparent distress. Pleasant and conversational, speaking in full sentences.  CV: Regular rhythm & rate, no murmurs. No edema. Peripheral pulses intact.  Respiratory: Clear to auscultation bilaterally, no wheezing, crackles, or rhonchi.  GI: Non-distended, soft, nontender to palpation. No rebound or guarding. Normoactive bowel sounds.  Skin: Warm, dry, no rashes on exposed skin.   Musculoskeletal / extremities: FROM in all extremities. Neurovascularly intact distally  Neurologic: CN 2-12 grossly intact    Data   Recent Labs   Lab 03/05/25 0528 03/05/25 0200 03/04/25 1443 03/04/25  0932   WBC 18.6* 16.1*  --   --    HGB 9.3* 9.5*  --   --    MCV 93 93  --   --     174  --   --    NA  --  137  --   --    POTASSIUM  --  4.1  --   --    CHLORIDE  --  105  --   --    CO2  --  22  --   --    BUN  --  16.8  --   --    CR  --  0.90  --   --    ANIONGAP  --  10  --   --    PAUL  --  8.1*  --   --    GLC  --  137*  137* 131* 94       Recent Labs   Lab 03/05/25 0200 03/04/25  1443 03/04/25  0932   *  137* 131* 94        Unresulted Labs Ordered in the Past 30 Days of this Admission       Date and Time Order Name Status Description    3/5/2025  5:18 AM Basic metabolic panel In process              Imaging  Recent  Results (from the past 24 hours)   XR Surgery BROOK L/T 5 Min Fluoro w Stills    Narrative    This exam was marked as non-reportable because it will not be read by a   radiologist or a [facility] non-radiologist provider.         XR Pelvis Port 1/2 Views    Narrative    EXAM: XR PELVIS PORT 1/2 VIEWS  LOCATION: [facility]  DATE: 3/4/2025    INDICATION: Status post Hip surgery  COMPARISON: 01/20/2025      Impression    IMPRESSION: Postoperative changes of right total hip arthroplasty in expected alignment. No acute periprosthetic fracture. Postsurgical gas in the soft tissue surrounding the right hip. Advanced hypertrophic degenerative arthritis of the left hip.        I reviewed all new labs and imaging results over the last 24 hours. I personally reviewed no images or EKG's today.    Medications   Current Facility-Administered Medications   Medication Dose Route Frequency Provider Last Rate Last Admin    lactated ringers infusion   Intravenous Continuous Dnaiel Hammonds  mL/hr at 03/05/25 0115 New Bag at 03/05/25 0115     Current Facility-Administered Medications   Medication Dose Route Frequency Provider Last Rate Last Admin    acetaminophen (TYLENOL) tablet 975 mg  975 mg Oral Q8H Daniel Hammonds MD   975 mg at 03/05/25 0009    aspirin (ASA) EC tablet 325 mg  325 mg Oral Daily Daniel Hammonds MD   325 mg at 03/05/25 0010    atorvastatin (LIPITOR) tablet 40 mg  40 mg Oral QPM Daniel Hammonds MD   40 mg at 03/04/25 1953    polyethylene glycol (MIRALAX) Packet 17 g  17 g Oral Daily Daniel Hammonds MD        senna-docusate (SENOKOT-S/PERICOLACE) 8.6-50 MG per tablet 1 tablet  1 tablet Oral BID Daniel Hammonds MD   1 tablet at 03/04/25 1952    sodium chloride (PF) 0.9% PF flush 3 mL  3 mL Intracatheter Q8H Daniel Hammonds MD        traZODone (DESYREL) tablet  mg   mg Oral At Bedtime Daniel Hammonds MD Carter Kindschy, PA-C

## 2025-03-05 NOTE — PROGRESS NOTES
"Inpatient PT Evaluation Note     03/05/25 0900   Appointment Info   Signing Clinician's Name / Credentials (PT) Wendy Marquez, PT MA #[id number]   Living Environment   People in Home spouse   Current Living Arrangements house   Home Accessibility stairs to enter home   Number of Stairs, Main Entrance 2   Stair Railings, Main Entrance   (Barn door handle on the (R))   Number of Stairs, Within Home, Primary greater than 10 stairs   Stair Railings, Within Home, Primary   (Staggered more barn door handles full length of stairs)   Transportation Anticipated family or friend will provide   Self-Care   Usual Activity Tolerance good   Regular Exercise Yes  (doing pre-op program, hip was limiting walking)   Activity/Exercise Type strength training   Equipment Currently Used at Home walker, rolling;raised toilet seat;grab bar, toilet;grab bar, tub/shower   Fall history within last six months no   General Information   Onset of Illness/Injury or Date of Surgery 03/05/25   Referring Physician Daniel Hammonds MD   Patient/Family Therapy Goals Statement (PT) Go home and be able to move through home easily.   Pertinent History of Current Problem (include personal factors and/or comorbidities that impact the POC) Pt has had OA (R) hip for \"a long time\" and states was not able to do abduction well, and admits taking short steps out of pain and precaution.  Pt underwent (R) BRENDEN on 3/5/25. Pt had Low BP post op and PO Day 1.   Existing Precautions/Restrictions   (WBAT and no hip precautions per MD note)   Weight-Bearing Status - RLE weight-bearing as tolerated   General Observations Pale and c/o \"light headed.\"   Cognition   Affect/Mental Status (Cognition) WNL   Orientation Status (Cognition) oriented x 4   Follows Commands (Cognition) WNL   Pain Assessment   Patient Currently in Pain Yes, see Vital Sign flowsheet  (\"no pain\" at rest. 2/10 when up walking, 4/10 with abduction in standing)   Integumentary/Edema   Integumentary/Edema " "Comments Incisional wound (R) hip   Range of Motion (ROM)   ROM Comment WFL @ (R) hip for trransfers and bed mobility.   Strength (Manual Muscle Testing)   Strength Comments (R) Hip: 4-/5 flexion, 3/5 abd, 4-/5 extension;  (R) Knee: flexion = 4/5, ext = 4/5   Bed Mobility   Bed Mobility supine-sit   Supine-Sit [place] (Bed Mobility) modified independence;verbal cues;1 person to manage equipment   Impairments Contributing to Impaired Bed Mobility pain;decreased strength   Assistive Device (Bed Mobility) bed rails   Transfers   Transfers sit-stand transfer   Maintains Weight-bearing Status (Transfers) able to maintain   Impairments Contributing to Impaired Transfers pain;decreased strength   Sit-Stand Transfer   Sit-Stand [place] (Transfers) verbal cues;contact guard  (CGA due to Low BP today and c/o \"lightheadedness\")   Assistive Device (Sit-Stand Transfers) walker, front-wheeled   Comment, (Sit-Stand Transfer) Cues for safety to use arm rests of chair vs walker   Gait/Stairs (Locomotion)   [place] Level (Gait) contact guard;1 person to manage equipment   Assistive Device (Gait) walker, front-wheeled   Distance in Feet (Gait) Limited to room distance and IV length, but accomplished 15 feet back and forth   Pattern (Gait) step-through  (after cues for correct step thru pattern)   Deviations/Abnormal Patterns (Gait) ingris decreased;base of support, narrow   Maintains Weight-bearing Status (Gait) able to maintain   Balance   Balance no deficits were identified   Sensory Examination   Sensory Perception WFL   Coordination   Coordination no deficits were identified   Muscle Tone   Muscle Tone no deficits were identified   Clinical Impression   Criteria for Skilled Therapeutic Intervention Yes, treatment indicated   PT Diagnosis (PT) Impaired function at the (R) hip   Influenced by the following impairments Pain, decreased strength, difficulty in ambulation   Functional limitations due to impairments gait, " transfers and bed mobility   Clinical Presentation (PT Evaluation Complexity) evolving   Clinical Presentation Rationale Clinical reasoning   Clinical Decision Making (Complexity) low complexity   Planned Therapy Interventions (PT) bed mobility training;gait training;home exercise program;neuromuscular re-education;patient/family education;postural re-education;ROM (range of motion);strengthening;stretching;stair training;transfer training;home program guidelines;progressive activity/exercise   Risk & Benefits of therapy have been explained evaluation/treatment results reviewed;care plan/treatment goals reviewed;participants voiced agreement with care plan;participants included;patient   Clinical Impression Comments Pt would benefit from Outpatient PT upon discharge from hospital.   PT Total Evaluation Time   PT Eval, Low Complexity Minutes (92847) 15   Physical Therapy Goals   PT Frequency Other (see comments)  (1-2x per day as tolerated)   PT Predicted Duration/Target Date for Goal Attainment 03/10/25   PT Goals Bed Mobility;Transfers;Gait;Stairs   PT: Bed Mobility Supine to/from sit;Modified independent   PT: Transfers Bed to/from chair;Modified independent   PT: Gait Supervision/stand-by assist;Rolling walker;150 feet   PT: Stairs Modified independent;2 stairs;Rail on right   Therapeutic Procedure/Exercise   Ther. Procedure: strength, endurance, ROM, flexibillity Minutes (20721) 15   Symptoms Noted During/After Treatment increased pain  (mild light headed sporadically)   Treatment Detail/Skilled Intervention Completed ankle pumps in sitting, heel slides in bed (x5) cued to not use his hand to assist, QS and glute sets.  All x 8-10 reps; mildly hurried thru supine exers as pt had to use bathroom.  Sitting: LAQ x 10.  Standing: hip ext x10, hip abd x 10, HS set with (R) foot/heel pressed against chair, and heel raises; each x 10. Adequate standing rest given between.   Gait Training   Gait Training Minutes (27183)  10   Symptoms Noted During/After Treatment (Gait Training) increased pain  (mild Light headedness)   Treatment Detail/Skilled Intervention Cued and demo'd step thru pattern for pt;  adjusted walker for appropriate height.   Distance in Feet 15 back and forth in room   Clinch Level (Gait Training) contact guard   Physical Assistance Level (Gait Training) 1 person assist  (for IVs walker set up)   Weight Bearing (Gait Training) weight-bearing as tolerated   Assistive Device (Gait Training) rolling walker   Pattern Analysis (Gait Training) swing-through gait   Gait Analysis Deviations decreased ingris;decreased step length   Impairments (Gait Analysis/Training) pain   PT Discharge Planning   PT Plan Home with wife; Outpatient PT asap   PT Discharge Recommendation (DC Rec) home with outpatient physical therapy   PT Rationale for DC Rec Pt doing well with gait and transfers; will be pending stair assessement   PT Brief overview of current status Mod Indep to assist of 1 for bed mob, transfers and gait.  Lightheadedness limits activity currently.   PT Total Distance Amb During Session (feet) 15   PT Equipment Needed at Discharge   (pt has walker, raised toilet seat and grab bars)   Physical Therapy Time and Intention   Timed Code Treatment Minutes 25   Total Session Time (sum of timed and untimed services) 40   Thank you for the referral of this patient.  Wendy Marquez, PT, MA  #[id number]    [facility]                                                                                   OUTPATIENT PHYSICAL THERAPY    PLAN OF TREATMENT FOR OUTPATIENT REHABILITATION   Patient's Last Name, First Name, Paulette Cano YOB: 1955   Provider's Name   [facility]   Medical Record No.  [id number]     Onset Date: 03/05/25 Start of Care Date: 03/05/25     Medical Diagnosis:  Osteoarthritis R Hip               PT Diagnosis:  Impaired function at the  (R) hip Certification Dates:  From: 03/05/25  To: 03/10/25       See note for plan of treatment, functional goals, and certification details.    I CERTIFY THE NEED FOR THESE SERVICES FURNISHED UNDER        THIS PLAN OF TREATMENT AND WHILE UNDER MY CARE (Physician co-signature of this document indicates review and certification of the therapy plan).

## 2025-03-05 NOTE — PLAN OF CARE
Patient vital signs are at baseline: No,  Reason: Low BPs overnight  Patient able to ambulate as they were prior to admission or with assist devices provided by therapies during their stay:  No,  Reason:  Low BPs overnight  Patient MUST void prior to discharge:  Yes, but urine clear pierre, very odorous.  Patient able to tolerate oral intake:  Yes  Pain has adequate pain control using Oral analgesics:  Yes  Does patient have an identified :  Yes  Has goal D/C date and time been discussed with patient:  Yes

## 2025-03-05 NOTE — PLAN OF CARE
Goal Outcome Evaluation:  Problem: Adult Inpatient Plan of Care  Goal: Optimal Comfort and Wellbeing  Outcome: Progressing  /58. He appears pale, denies dizziness. Patient dtv. Awaiting patient urge otherwise will bladder scan. Aquacell dressing clean, dry and intact with ice. Pain controlled at 3-4/10.

## 2025-03-05 NOTE — CONSULTS
Care Management Note:    Care Management team received referral from Ortho team to assist pt with discharge planning services post surgical services.    Per IDT rounds, EMR review, and/or discussion with PT/OT staff, it has been determined that pt will discharge to home and attend outpatient therapy services.    [facility] provider group aware of plans.      Care Management will close referral at this time.    ETELVINA Phillips  Care Transitions   Tele: [phone]    
Instructions: This plan will send the code FBSE to the PM system.  DO NOT or CHANGE the price.
Detail Level: Simple
Price (Do Not Change): 0.00

## 2025-03-06 ENCOUNTER — LAB (OUTPATIENT)
Dept: LAB | Facility: CLINIC | Age: 70
End: 2025-03-06
Payer: COMMERCIAL

## 2025-03-06 DIAGNOSIS — Z96.641 STATUS POST RIGHT HIP REPLACEMENT: ICD-10-CM

## 2025-03-06 LAB — HGB BLD-MCNC: 9.7 G/DL (ref 13.3–17.7)

## 2025-03-11 ENCOUNTER — TELEPHONE (OUTPATIENT)
Dept: SURGERY | Facility: CLINIC | Age: 70
End: 2025-03-11
Payer: COMMERCIAL

## 2025-03-11 NOTE — TELEPHONE ENCOUNTER
Called wife and discussed that the order is in and gave the phone number for DME. Wife confirmed understanding.     Nolvia SEQUEIRA RN, [facility] 03/11/25 1:18 PM

## 2025-03-11 NOTE — TELEPHONE ENCOUNTER
Other: Patient's wife Srinivasan is calling. She would like to know if Dr. Hammonds can write a prescription for crutches for the patient. DOS 03/04/25. If Dr. Hammonds is able to write the prescription Srinivasan can go and pick it up at the  in [place]. Patient also went to get a hemoglobin test on 03/06 and hasn't received results yet.       Could we send this information to you in [employer] or would you prefer to receive a phone call?:   Patient would prefer a phone call   Okay to leave a detailed message?: Yes at Home number on file [phone] (home)

## 2025-03-12 ENCOUNTER — PATIENT OUTREACH (OUTPATIENT)
Dept: GERIATRIC MEDICINE | Facility: CLINIC | Age: 70
End: 2025-03-12
Payer: COMMERCIAL

## 2025-03-12 DIAGNOSIS — M16.11 PRIMARY OSTEOARTHRITIS OF RIGHT HIP: Primary | ICD-10-CM

## 2025-03-12 NOTE — PROGRESS NOTES
[facility] Care Coordination Contact    Care coordinator received a call from member's spouse (Srinivasan) who reports that she is still working on getting an Rx from the ortho surgeon for crutches. She reports that Dr Hammonds did write an Rx, but it was rejected by the insurance per [employer] due to the diagnosis code.  Srinivasan reports that she does have a call into Dr. Stoll team requesting a revised prescription, but with him being out of the office until next week, she is wondering if his PCP can do this.  Care coordinator will route a DME order to PCP for signature.      Brandy Harris RN   [facility]  [phone]

## 2025-03-13 NOTE — TELEPHONE ENCOUNTER
Covering provider.  DME crutches order placed.    1. Primary osteoarthritis of right hip (Primary)  - Crutches Order

## 2025-03-15 ASSESSMENT — HOOS JR
GOING UP OR DOWN STAIRS: MODERATE
WALKING ON UNEVEN SURFACE: MODERATE
LYING IN BED (TURNING OVER, MAINTAINING HIP POSITION): SEVERE
SITTING: MILD
HOOS JR TOTAL INTERVAL SCORE: 49.86
RISING FROM SITTING: MODERATE
BENDING TO THE FLOOR TO PICK UP OBJECT: SEVERE

## 2025-03-20 ENCOUNTER — OFFICE VISIT (OUTPATIENT)
Dept: ORTHOPEDICS | Facility: CLINIC | Age: 70
End: 2025-03-20
Payer: COMMERCIAL

## 2025-03-20 VITALS — HEIGHT: 76 IN | WEIGHT: 232 LBS | BODY MASS INDEX: 28.25 KG/M2

## 2025-03-20 DIAGNOSIS — Z96.641 S/P HIP REPLACEMENT, RIGHT: Primary | ICD-10-CM

## 2025-03-20 ASSESSMENT — PAIN SCALES - GENERAL: PAINLEVEL_OUTOF10: MILD PAIN (2)

## 2025-03-20 NOTE — LETTER
3/20/2025      Paulette Dorsey  [address]   [place] [address]      Dear Colleague,    Thank you for referring your patient, Paulette Dorsey, to the [facility]. Please see a copy of my visit note below.      CHIEF COMPLAINT:   Chief Complaint   Patient presents with     Right Hip - Surgical Followup     Sabrina Rivera is here today 2 weeks s/p BRENDEN on 3.4.25.  His pain is doing well, gets occasional soreness here and there but uses tylenol, ice, ibuprofen to control well.  Incision site looks good, no redness or swelling.           SURGERY: Total hip arthroplasty, right hip, anterior approach ([facility])  DATE OF SURGERY: 3/4/2025       HISTORY OF PRESENT ILLNESS: Paulette Dorsey is a 69 year old male seen for postoperative evaluation of right total hip arthroplasty. It has been 2 weeks since surgery. Returns today stating doing well. Soreness comes and goes. Denies any wound problems. Denies numbness, tingling, or weakness in the affected extremity. Denies fevers chills night sweats. Continues to take aspirin for anti-coagulation for deep vein thrombosis prophylaxis. Use of pain medications has been tylenol, ibuprofen, ice.  Concerns today include: none. He is already pleased.    Present symptoms: mild pain, mild swelling.    Pain severity: 2/10      PAST MEDICAL HISTORY:   Past Medical History:   Diagnosis Date     Arthritis      Colon cancer (H) 8/05    Dr. King Noe, oncology [facility].  Had surgery and chemo     Combined forms of age-related cataract, mild, of both eyes 8/14/2021     GERD (gastroesophageal reflux disease)      High cholesterol      Peripheral arterial disease     popliteal aneurysm [place]     Patient Active Problem List   Diagnosis     CARDIOVASCULAR SCREENING; LDL GOAL LESS THAN 100     Choroidal nevus of left eye     SNHL (sensory-neural hearing loss), asymmetrical     Bilateral popliteal artery aneurysm     Combined forms of  age-related cataract, mild, of both eyes     Pure hypercholesterolemia     Osteoarthrosis     Malignant neoplasm of colon, unspecified part of colon (H)       PAST SURGICAL HISTORY:   Past Surgical History:   Procedure Laterality Date     ABDOMEN SURGERY  aug 2025    Colon Cancer     COLECTOMY  08/2005    partial colectomy     COLONOSCOPY  Mar 2024     HERNIA REPAIR, INGUINAL RT/LT      Hernia Repair, Femoral RT     SURGICAL HISTORY OF -       left popliteal bypass secondary to aneurysm     VASCULAR SURGERY  June 2006    Aneurysm popliteal Left       Medications: No current facility-administered medications for this visit.    Current Outpatient Medications:      acetaminophen (TYLENOL) 325 MG tablet, Take 2 tablets (650 mg) by mouth every 4 hours as needed for other (mild pain)., Disp: 100 tablet, Rfl: 0     aspirin (ASA) 325 MG EC tablet, Take 1 tablet (325 mg) by mouth daily., Disp: 30 tablet, Rfl: 0     HYDROmorphone (DILAUDID) 2 MG tablet, Take 1-2 tablets (2-4 mg) by mouth every 6 hours as needed for moderate to severe pain., Disp: 15 tablet, Rfl: 0     hydrOXYzine HCl (ATARAX) 10 MG tablet, Take 1 tablet (10 mg) by mouth every 6 hours as needed for itching or anxiety (with pain, moderate pain)., Disp: 30 tablet, Rfl: 0     senna-docusate (SENOKOT-S/PERICOLACE) 8.6-50 MG tablet, Take 1-2 tablets by mouth 2 times daily. Take while on oral narcotics to prevent or treat constipation., Disp: 30 tablet, Rfl: 0    Facility-Administered Medications Ordered in Other Visits:      ceFAZolin Sodium (ANCEF) injection 2 g, 2 g, Intravenous, Pre-Op/Pre-procedure x 1 dose, Daniel Hammonds MD     ceFAZolin Sodium (ANCEF) injection 2 g, 2 g, Intravenous, See Admin Instructions, Daniel Hammonds MD     lactated ringers infusion, , Intravenous, Continuous, Crystal Morrissey APRN CRNA, Last Rate: 100 mL/hr at 03/04/25 0935, New Bag at 03/04/25 0935     lidocaine (LMX4) kit, , Topical, Q1H PRN, Crystal Morrissey APRN CRNA      "lidocaine 1 % 0.1-1 mL, 0.1-1 mL, Other, Q1H PRN, Crystal Morrissey APRN CRNA, 0.1 mL at 03/04/25 0935     sodium chloride (PF) 0.9% PF flush 3 mL, 3 mL, Intracatheter, Q8H, Crystal Morrissey APRN CRNA     sodium chloride (PF) 0.9% PF flush 3 mL, 3 mL, Intracatheter, q1 min prn, Crystal Morrissey APRN CRNA    Allergies:   Allergies   Allergen Reactions     Buspirone Other (See Comments)     Feels like he is going to jump out of his skin     Hydrocodone Nausea and Vomiting     Sweating     Oxycodone Other (See Comments)     Tylenol/Acetaminophen is OK.  Sweating, pins and needles.  Nightmares     Oxycodone-Acetaminophen Nausea and Vomiting     Finding of vomiting         REVIEW OF SYSTEMS:  CONSTITUTIONAL:NEGATIVE for fever, chills, night sweats, change in weight  INTEGUMENTARY/SKIN: NEGATIVE for worrisome rashes, moles or lesions  MUSCULOSKELETAL:See HPI above  NEURO: NEGATIVE for weakness, dizziness or paresthesias    PHYSICAL EXAM:  Ht 1.93 m (6' 4\")   Wt 105.2 kg (232 lb)   BMI 28.24 kg/m     GENERAL APPEARANCE: healthy, alert, no distress  SKIN: no suspicious lesions or rashes  NEURO: Normal strength and tone, mentation intact and speech normal  PSYCH:  mentation appears normal and affect normal/bright  RESPIRATORY: No increased work of breathing.    BILATERAL LOWER EXTREMITIES:  Gait: favors the right, using crutches.    Bilateral calf soft and nttp or squeeze.    right lower extremity:  Intact sensation deep peroneal nerve, superficial peroneal nerve, med/lat tibial nerve, sural nerve, saphenous nerve  Intact EHL, EDL, TA, FHL, GS, quadriceps hamstrings and hip flexors   Edema: trace    right HIP EXAM:    Incision: healing well. Dry. No open wounds. No drainage. No erythema. Resolving ecchymosis.  Mild-moderate swelling..  Palpation: Tender:   incisional, tensor.  Strength:  4/5  Special tests:  Irritability (flexion/adduction/internal rotation) negative.  Hip range of motion: normal, all pain free       X-RAY:  " AP pelvis, lateral views right hip from 3/20/2025 were reviewed in clinic today. No obvious fractures or dislocations. right hip arthroplasty components in place without obvious failure, complication, fracture, or dislocation. Severe left hip osteoarthritis.      Impression: 69 year old male seen for postoperative evaluation of right total hip arthroplasty. It has been 2 weeks since surgery. Doing well.      Plan:   * reviewed xrays with patient today showing total hip arthroplasty implants.  * continue DVT prophylaxis for a total of 4 weeks postoperative  * wean off all pain medications as tolerated  * xrays next visit: lowset AP pelvis and lateral hip  * return to clinic 4 weeks, sooner if needed.  * all questions addressed and answered prior to discharge from clinic today to patient's satisfaction.  * patient will need longterm (at least 1 years depending on risk factors) prophylactic antibiotics use with dental procedures or other invasive procedures (2 g amoxicilin or  2g Keflex or 500mg azithromycin or clarithromycin or 100mg doxycycline) 30-60 minutes prior to dental procedures.        Daniel Hammonds M.D., M.S.  Dept. of Orthopaedic Surgery  [facility]             Again, thank you for allowing me to participate in the care of your patient.        Sincerely,        Daniel Hammonds MD    Electronically signed

## 2025-04-25 ENCOUNTER — ANCILLARY PROCEDURE (OUTPATIENT)
Dept: GENERAL RADIOLOGY | Facility: CLINIC | Age: 70
End: 2025-04-25
Attending: ORTHOPAEDIC SURGERY
Payer: COMMERCIAL

## 2025-04-25 DIAGNOSIS — Z96.641 S/P HIP REPLACEMENT, RIGHT: ICD-10-CM

## 2025-04-25 PROCEDURE — 73502 X-RAY EXAM HIP UNI 2-3 VIEWS: CPT | Mod: TC | Performed by: STUDENT IN AN ORGANIZED HEALTH CARE EDUCATION/TRAINING PROGRAM

## 2025-05-19 ENCOUNTER — TELEPHONE (OUTPATIENT)
Dept: FAMILY MEDICINE | Facility: CLINIC | Age: 70
End: 2025-05-19
Payer: COMMERCIAL

## 2025-05-19 NOTE — TELEPHONE ENCOUNTER
Patient Quality Outreach    Patient is due for the following:   Physical Annual Wellness Visit    Action(s) Taken:   Schedule a Annual Wellness Visit    Type of outreach:    Sent [employer] message.    Questions for provider review:    None         Miriam Desai  Chart routed to None.

## 2025-06-19 ENCOUNTER — PATIENT OUTREACH (OUTPATIENT)
Dept: GERIATRIC MEDICINE | Facility: CLINIC | Age: 70
End: 2025-06-19
Payer: COMMERCIAL

## 2025-06-19 NOTE — PROGRESS NOTES
[facility] Care Coordination Contact      [facility] Six-Month Telephone Assessment    6 month telephone assessment completed on 6/19/25.    ER visits: No  Hospitalizations: Yes -  [facility]. This was a planned admission for hip surgery.    TCU stays: No  Significant health status changes: No.  Jairo is doing well post surgery. Care coordinator spoke to spouse who reports no complications.   Falls/Injuries: No  ADL/IADL changes: No  Changes in services: No    Caregiver Assessment follow up:  N/A    Goals: See Support Plan for goal progress documentation.      Will see member in 6 months for an annual health risk assessment.   Encouraged member to call CC with any questions or concerns in the meantime.     Brandy Harris RN   [facility]  [phone]

## 2025-06-21 ENCOUNTER — HEALTH MAINTENANCE LETTER (OUTPATIENT)
Age: 70
End: 2025-06-21

## 2025-07-03 ENCOUNTER — OFFICE VISIT (OUTPATIENT)
Dept: OPHTHALMOLOGY | Facility: CLINIC | Age: 70
End: 2025-07-03
Payer: COMMERCIAL

## 2025-07-03 DIAGNOSIS — H25.813 COMBINED FORMS OF AGE-RELATED CATARACT OF BOTH EYES: ICD-10-CM

## 2025-07-03 DIAGNOSIS — H52.4 PRESBYOPIA: ICD-10-CM

## 2025-07-03 DIAGNOSIS — Z01.01 ENCOUNTER FOR EXAMINATION OF EYES AND VISION WITH ABNORMAL FINDINGS: Primary | ICD-10-CM

## 2025-07-03 DIAGNOSIS — H43.811 POSTERIOR VITREOUS DETACHMENT, RIGHT EYE: ICD-10-CM

## 2025-07-03 PROBLEM — I65.22 CAROTID STENOSIS, LEFT: Status: ACTIVE | Noted: 2018-02-08

## 2025-07-03 PROBLEM — R41.82 ALTERED MENTAL STATUS: Status: ACTIVE | Noted: 2022-05-10

## 2025-07-03 PROBLEM — K64.4 EXTERNAL HEMORRHOIDS: Status: ACTIVE | Noted: 2019-02-01

## 2025-07-03 PROBLEM — R55 NEAR SYNCOPE: Status: ACTIVE | Noted: 2022-05-10

## 2025-07-03 PROBLEM — N52.9 ERECTILE DYSFUNCTION: Status: ACTIVE | Noted: 2025-07-03

## 2025-07-03 PROBLEM — K21.9 GASTROESOPHAGEAL REFLUX DISEASE: Status: ACTIVE | Noted: 2025-07-03

## 2025-07-03 PROBLEM — K63.5 POLYP OF COLON: Status: ACTIVE | Noted: 2019-02-01

## 2025-07-03 PROBLEM — R50.9 FEVER: Status: ACTIVE | Noted: 2022-10-22

## 2025-07-03 PROBLEM — R06.09 EXERTIONAL DYSPNEA: Status: ACTIVE | Noted: 2018-05-11

## 2025-07-03 PROBLEM — Z85.038 HISTORY OF MALIGNANT NEOPLASM OF COLON: Status: ACTIVE | Noted: 2019-02-01

## 2025-07-03 PROBLEM — E78.5 HYPERLIPIDEMIA: Status: ACTIVE | Noted: 2025-07-03

## 2025-07-03 PROBLEM — R97.20 ELEVATED PSA: Status: ACTIVE | Noted: 2025-07-03

## 2025-07-03 PROBLEM — M25.559 HIP PAIN: Status: ACTIVE | Noted: 2025-07-03

## 2025-07-03 PROBLEM — F41.9 ANXIETY: Status: ACTIVE | Noted: 2018-04-10

## 2025-07-03 PROCEDURE — 92014 COMPRE OPH EXAM EST PT 1/>: CPT | Performed by: OPHTHALMOLOGY

## 2025-07-03 PROCEDURE — 92015 DETERMINE REFRACTIVE STATE: CPT | Performed by: OPHTHALMOLOGY

## 2025-07-03 ASSESSMENT — CUP TO DISC RATIO
OD_RATIO: 0.3
OS_RATIO: 0.35

## 2025-07-03 ASSESSMENT — REFRACTION_MANIFEST
OD_SPHERE: -2.50
OD_ADD: +2.50
OD_CYLINDER: SPHERE
OS_AXIS: 170
OS_SPHERE: -2.25
OS_ADD: +2.50
OS_CYLINDER: +1.25

## 2025-07-03 ASSESSMENT — REFRACTION_WEARINGRX
OS_AXIS: 004
OD_CYLINDER: +0.25
OS_SPHERE: -2.25
OD_AXIS: 089
OD_ADD: +1.25
SPECS_TYPE: COMPUTER BIFOCAL
OS_CYLINDER: +0.25
OD_SPHERE: -2.75
OS_ADD: +1.25

## 2025-07-03 ASSESSMENT — EXTERNAL EXAM - LEFT EYE: OS_EXAM: NORMAL

## 2025-07-03 ASSESSMENT — TONOMETRY
OD_IOP_MMHG: 19
IOP_METHOD: APPLANATION
OS_IOP_MMHG: 20

## 2025-07-03 ASSESSMENT — CONF VISUAL FIELD
OD_SUPERIOR_TEMPORAL_RESTRICTION: 0
OD_NORMAL: 1
OS_INFERIOR_NASAL_RESTRICTION: 0
OD_SUPERIOR_NASAL_RESTRICTION: 0
OS_SUPERIOR_NASAL_RESTRICTION: 0
OD_INFERIOR_NASAL_RESTRICTION: 0
OS_SUPERIOR_TEMPORAL_RESTRICTION: 0
OD_INFERIOR_TEMPORAL_RESTRICTION: 0
OS_NORMAL: 1
OS_INFERIOR_TEMPORAL_RESTRICTION: 0

## 2025-07-03 ASSESSMENT — SLIT LAMP EXAM - LIDS
COMMENTS: 2+ DERMATOCHALASIS - UPPER LID
COMMENTS: 2+ DERMATOCHALASIS - UPPER LID

## 2025-07-03 ASSESSMENT — VISUAL ACUITY
OD_CC+: -2
OS_CC: 20/30
OS_CC+: -2
METHOD: SNELLEN - LINEAR
OD_CC: 20/25
CORRECTION_TYPE: GLASSES

## 2025-07-03 ASSESSMENT — EXTERNAL EXAM - RIGHT EYE: OD_EXAM: NORMAL

## 2025-07-03 NOTE — PATIENT INSTRUCTIONS
"Glasses Rx given - optional     May use artificial tears up to four times a day (like Refresh Optive, Systane Balance, or TheraTears. Avoid \"get the red out\" drops and generic artifical tears).     Possible posterior vitreous detachment (sudden onset large floater and/or flashing lights) left eye discussed.     Call in March 2026 for an appointment in July 2026 for Complete Exam    Dr. Alicia [phone]   "

## 2025-07-03 NOTE — PROGRESS NOTES
" Current Eye Medications:  none     Subjective:  here for complete eye exam today. He is having trouble seeing up close and hard to see signs.  His glasses are quite old. He just had a hip replacement recently, also has a bad back and neck. Wears a hearing aid      Objective:  See Ophthalmology Exam.       Assessment:  Stable eye exam.      ICD-10-CM    1. Encounter for examination of eyes and vision with abnormal findings  Z01.01       2. Presbyopia  H52.4       3. Combined forms of age-related cataract, mild, of both eyes  H25.813       4. Posterior vitreous detachment, right eye  H43.811            Plan:  Glasses Rx given - optional     May use artificial tears up to four times a day (like Refresh Optive, Systane Balance, or TheraTears. Avoid \"get the red out\" drops and generic artifical tears).     Possible posterior vitreous detachment (sudden onset large floater and/or flashing lights) left eye discussed.     Call in March 2026 for an appointment in July 2026 for Complete Exam    Dr. Alicia [phone]       "

## 2025-07-03 NOTE — LETTER
"7/3/2025      Paulette Dorsey  [address]   [place] [address]      Dear Colleague,    Thank you for referring your patient, Paulette Dorsey, to the [facility]. Please see a copy of my visit note below.     Current Eye Medications:  none     Subjective:  here for complete eye exam today. He is having trouble seeing up close and hard to see signs.  His glasses are quite old. He just had a hip replacement recently, also has a bad back and neck. Wears a hearing aid      Objective:  See Ophthalmology Exam.       Assessment:  Stable eye exam.      ICD-10-CM    1. Encounter for examination of eyes and vision with abnormal findings  Z01.01       2. Presbyopia  H52.4       3. Combined forms of age-related cataract, mild, of both eyes  H25.813       4. Posterior vitreous detachment, right eye  H43.811            Plan:  Glasses Rx given - optional     May use artificial tears up to four times a day (like Refresh Optive, Systane Balance, or TheraTears. Avoid \"get the red out\" drops and generic artifical tears).     Possible posterior vitreous detachment (sudden onset large floater and/or flashing lights) left eye discussed.     Call in March 2026 for an appointment in July 2026 for Complete Exam    Dr. Alicia [phone]         Again, thank you for allowing me to participate in the care of your patient.        Sincerely,        Osmin Alicia MD    Electronically signed"

## 2025-07-15 ENCOUNTER — APPOINTMENT (OUTPATIENT)
Dept: OPTOMETRY | Facility: CLINIC | Age: 70
End: 2025-07-15
Payer: COMMERCIAL

## 2025-07-15 PROCEDURE — 92342 FIT SPECTACLES MULTIFOCAL: CPT | Performed by: OPHTHALMOLOGY

## 2025-09-03 ENCOUNTER — ANCILLARY PROCEDURE (OUTPATIENT)
Dept: GENERAL RADIOLOGY | Facility: CLINIC | Age: 70
End: 2025-09-03
Attending: ORTHOPAEDIC SURGERY
Payer: COMMERCIAL

## 2025-09-03 ENCOUNTER — OFFICE VISIT (OUTPATIENT)
Dept: ORTHOPEDICS | Facility: CLINIC | Age: 70
End: 2025-09-03
Payer: COMMERCIAL

## 2025-09-03 VITALS
BODY MASS INDEX: 28.24 KG/M2 | SYSTOLIC BLOOD PRESSURE: 145 MMHG | HEART RATE: 82 BPM | WEIGHT: 232 LBS | DIASTOLIC BLOOD PRESSURE: 87 MMHG

## 2025-09-03 DIAGNOSIS — G89.29 CHRONIC PAIN OF RIGHT KNEE: ICD-10-CM

## 2025-09-03 DIAGNOSIS — M25.561 CHRONIC PAIN OF RIGHT KNEE: ICD-10-CM

## 2025-09-03 DIAGNOSIS — M25.512 CHRONIC PAIN OF BOTH SHOULDERS: ICD-10-CM

## 2025-09-03 DIAGNOSIS — G89.29 CHRONIC PAIN OF RIGHT KNEE: Primary | ICD-10-CM

## 2025-09-03 DIAGNOSIS — M25.552 PAIN OF LEFT HIP: ICD-10-CM

## 2025-09-03 DIAGNOSIS — G89.29 CHRONIC PAIN OF BOTH SHOULDERS: ICD-10-CM

## 2025-09-03 DIAGNOSIS — M17.11 PRIMARY OSTEOARTHRITIS OF RIGHT KNEE: ICD-10-CM

## 2025-09-03 DIAGNOSIS — M25.511 CHRONIC PAIN OF BOTH SHOULDERS: ICD-10-CM

## 2025-09-03 DIAGNOSIS — M25.561 CHRONIC PAIN OF RIGHT KNEE: Primary | ICD-10-CM

## 2025-09-03 PROCEDURE — 73562 X-RAY EXAM OF KNEE 3: CPT | Mod: TC | Performed by: RADIOLOGY

## 2025-09-03 RX ORDER — LIDOCAINE HYDROCHLORIDE 10 MG/ML
4 INJECTION, SOLUTION INFILTRATION; PERINEURAL
Status: COMPLETED | OUTPATIENT
Start: 2025-09-03 | End: 2025-09-03

## 2025-09-03 RX ORDER — METHYLPREDNISOLONE ACETATE 80 MG/ML
80 INJECTION, SUSPENSION INTRA-ARTICULAR; INTRALESIONAL; INTRAMUSCULAR; SOFT TISSUE
Status: COMPLETED | OUTPATIENT
Start: 2025-09-03 | End: 2025-09-03

## 2025-09-03 RX ADMIN — METHYLPREDNISOLONE ACETATE 80 MG: 80 INJECTION, SUSPENSION INTRA-ARTICULAR; INTRALESIONAL; INTRAMUSCULAR; SOFT TISSUE at 15:55

## 2025-09-03 RX ADMIN — LIDOCAINE HYDROCHLORIDE 4 ML: 10 INJECTION, SOLUTION INFILTRATION; PERINEURAL at 15:55

## (undated) DEVICE — SU VICRYL 0 CT-1 36" J946H

## (undated) DEVICE — SU PDO 1 STRATAFIX 36X36CM CTX TAPERPOINT SXPD2B405

## (undated) DEVICE — DRAPE U SPLIT 74X120" 29440

## (undated) DEVICE — SU ETHIBOND 1 CT-1 30" X425H

## (undated) DEVICE — SOL NACL 0.9% IRRIG 3000ML BAG 2B7477

## (undated) DEVICE — GOWN IMPERVIOUS SPECIALTY XLG/XLONG 32474

## (undated) DEVICE — DRAPE C-ARM 60X42" 1013

## (undated) DEVICE — GLOVE BIOGEL PI MICRO INDICATOR UNDERGLOVE SZ 7.5 48975

## (undated) DEVICE — DRSG AQUACEL AG 3.5X9.75" HYDROFIBER 412011

## (undated) DEVICE — GLOVE BIOGEL PI MICRO INDICATOR UNDERGLOVE SZ 8.0 48980

## (undated) DEVICE — SOL WATER IRRIG 1000ML BOTTLE 07139-09

## (undated) DEVICE — SU ETHIBOND 0 CT-1 CR 8X18" CX21D

## (undated) DEVICE — ESU HOLSTER PLASTIC DISP E2400

## (undated) DEVICE — ESU PENCIL SMOKE EVAC W/ROCKER SWITCH 0703-047-000

## (undated) DEVICE — BLADE SAW SAGITTAL STRK 25X90X1.37MM 4H SYS 6 6125-137-090

## (undated) DEVICE — PACK TOTAL HIP W/POUCH RIVERSIDE LATEX FREE

## (undated) DEVICE — ESU ELEC BLADE 6" COATED E1450-6

## (undated) DEVICE — POUCH FLUID LARGE 20X19IN 20X3.75IN DRAPE D1016

## (undated) DEVICE — SU MONOCRYL 3-0 PS-2 27" Y427H

## (undated) DEVICE — BONE CLEANING TIP INTERPULSE  0210-010-000

## (undated) DEVICE — MAT SURGICAL FLOOR ABSORBANT 40X36" WHITE 5006W

## (undated) DEVICE — DRAPE POUCH INSTRUMENT 3 POCKET 1018L

## (undated) DEVICE — SUCTION TIP YANKAUER STR K87

## (undated) DEVICE — STOCKING SLEEVE COMPRESSION CALF MED

## (undated) DEVICE — GLOVE BIOGEL PI MICRO SZ 8.0 48580

## (undated) DEVICE — GLOVE BIOGEL PI MICRO SZ 7.5 48575

## (undated) DEVICE — PREP CHLORAPREP 26ML TINTED ORANGE  260815

## (undated) DEVICE — GOWN XLG DISP 9545

## (undated) DEVICE — SUCTION IRR SYSTEM W/O TIP INTERPULSE HANDPIECE 0210-100-000

## (undated) DEVICE — SOL NACL 0.9% 100ML BAG 2B1302

## (undated) DEVICE — KIT PATIENT CARE HANA TABLE PROFX SUPINE 6855

## (undated) DEVICE — SYR BULB IRRIG DOVER 60 ML LATEX FREE 67000

## (undated) DEVICE — SOL NACL 0.9% IRRIG 1000ML BOTTLE 07138-09

## (undated) DEVICE — SU MONOCRYL 3-0 SH 27" Y316H

## (undated) DEVICE — ESU BIPOLAR SEALER AQUAMANTYS 6MM 23-112-1

## (undated) DEVICE — HOOD T4 PROTECTIVE STERI FACE SHIELD 400-800

## (undated) DEVICE — SU DERMABOND ADVANCED .7ML DNX12

## (undated) RX ORDER — FENTANYL CITRATE-0.9 % NACL/PF 10 MCG/ML
PLASTIC BAG, INJECTION (ML) INTRAVENOUS
Status: DISPENSED
Start: 2025-03-04

## (undated) RX ORDER — LIDOCAINE HYDROCHLORIDE 10 MG/ML
INJECTION, SOLUTION EPIDURAL; INFILTRATION; INTRACAUDAL; PERINEURAL
Status: DISPENSED
Start: 2025-03-04

## (undated) RX ORDER — EPHEDRINE SULFATE 50 MG/ML
INJECTION, SOLUTION INTRAMUSCULAR; INTRAVENOUS; SUBCUTANEOUS
Status: DISPENSED
Start: 2025-03-04

## (undated) RX ORDER — CEFAZOLIN SODIUM 1 G/3ML
INJECTION, POWDER, FOR SOLUTION INTRAMUSCULAR; INTRAVENOUS
Status: DISPENSED
Start: 2025-03-04

## (undated) RX ORDER — METHOCARBAMOL 500 MG/1
TABLET, FILM COATED ORAL
Status: DISPENSED
Start: 2025-03-04

## (undated) RX ORDER — FENTANYL CITRATE 50 UG/ML
INJECTION, SOLUTION INTRAMUSCULAR; INTRAVENOUS
Status: DISPENSED
Start: 2025-03-04

## (undated) RX ORDER — TRANEXAMIC ACID 10 MG/ML
INJECTION, SOLUTION INTRAVENOUS
Status: DISPENSED
Start: 2025-03-04

## (undated) RX ORDER — TRANEXAMIC ACID 650 MG/1
TABLET ORAL
Status: DISPENSED
Start: 2025-03-04

## (undated) RX ORDER — HYDROXYZINE HYDROCHLORIDE 10 MG/1
TABLET, FILM COATED ORAL
Status: DISPENSED
Start: 2025-03-04

## (undated) RX ORDER — PROPOFOL 10 MG/ML
INJECTION, EMULSION INTRAVENOUS
Status: DISPENSED
Start: 2025-03-04

## (undated) RX ORDER — GABAPENTIN 100 MG/1
CAPSULE ORAL
Status: DISPENSED
Start: 2025-03-04

## (undated) RX ORDER — EPINEPHRINE 1 MG/ML
INJECTION, SOLUTION, CONCENTRATE INTRAVENOUS
Status: DISPENSED
Start: 2025-03-04

## (undated) RX ORDER — ACETAMINOPHEN 325 MG/1
TABLET ORAL
Status: DISPENSED
Start: 2025-03-04

## (undated) RX ORDER — HYDROMORPHONE HCL IN WATER/PF 6 MG/30 ML
PATIENT CONTROLLED ANALGESIA SYRINGE INTRAVENOUS
Status: DISPENSED
Start: 2025-03-04

## (undated) RX ORDER — VANCOMYCIN HYDROCHLORIDE 1 G/20ML
INJECTION, POWDER, LYOPHILIZED, FOR SOLUTION INTRAVENOUS
Status: DISPENSED
Start: 2025-03-04

## (undated) RX ORDER — METOPROLOL TARTRATE 1 MG/ML
INJECTION, SOLUTION INTRAVENOUS
Status: DISPENSED
Start: 2025-03-04

## (undated) RX ORDER — GLYCOPYRROLATE 0.2 MG/ML
INJECTION, SOLUTION INTRAMUSCULAR; INTRAVENOUS
Status: DISPENSED
Start: 2025-03-04

## (undated) RX ORDER — KETOROLAC TROMETHAMINE 15 MG/ML
INJECTION, SOLUTION INTRAMUSCULAR; INTRAVENOUS
Status: DISPENSED
Start: 2025-03-04

## (undated) RX ORDER — ROPIVACAINE HYDROCHLORIDE 5 MG/ML
INJECTION, SOLUTION EPIDURAL; INFILTRATION; PERINEURAL
Status: DISPENSED
Start: 2025-03-04